# Patient Record
Sex: MALE | Race: WHITE | NOT HISPANIC OR LATINO | Employment: OTHER | URBAN - METROPOLITAN AREA
[De-identification: names, ages, dates, MRNs, and addresses within clinical notes are randomized per-mention and may not be internally consistent; named-entity substitution may affect disease eponyms.]

---

## 2017-01-26 ENCOUNTER — ALLSCRIPTS OFFICE VISIT (OUTPATIENT)
Dept: OTHER | Facility: OTHER | Age: 77
End: 2017-01-26

## 2017-01-26 ENCOUNTER — TRANSCRIBE ORDERS (OUTPATIENT)
Dept: ADMINISTRATIVE | Facility: HOSPITAL | Age: 77
End: 2017-01-26

## 2017-01-26 DIAGNOSIS — E78.2 MIXED HYPERLIPIDEMIA: ICD-10-CM

## 2017-01-26 DIAGNOSIS — I48.0 PAROXYSMAL ATRIAL FIBRILLATION (HCC): Primary | ICD-10-CM

## 2017-01-26 DIAGNOSIS — R42 DIZZINESS AND GIDDINESS: ICD-10-CM

## 2017-01-26 DIAGNOSIS — I48.19 PERSISTENT ATRIAL FIBRILLATION (HCC): ICD-10-CM

## 2017-02-24 ENCOUNTER — HOSPITAL ENCOUNTER (OUTPATIENT)
Dept: NON INVASIVE DIAGNOSTICS | Facility: CLINIC | Age: 77
Discharge: HOME | End: 2017-02-24
Payer: COMMERCIAL

## 2017-02-24 DIAGNOSIS — I48.0 PAROXYSMAL ATRIAL FIBRILLATION (HCC): ICD-10-CM

## 2017-02-24 DIAGNOSIS — I48.19 PERSISTENT ATRIAL FIBRILLATION (HCC): ICD-10-CM

## 2017-02-24 DIAGNOSIS — R42 DIZZINESS AND GIDDINESS: ICD-10-CM

## 2017-02-24 PROCEDURE — 93306 TTE W/DOPPLER COMPLETE: CPT

## 2017-07-27 ENCOUNTER — ALLSCRIPTS OFFICE VISIT (OUTPATIENT)
Dept: OTHER | Facility: OTHER | Age: 77
End: 2017-07-27

## 2017-10-16 ENCOUNTER — GENERIC CONVERSION - ENCOUNTER (OUTPATIENT)
Dept: OTHER | Facility: OTHER | Age: 77
End: 2017-10-16

## 2017-10-24 ENCOUNTER — GENERIC CONVERSION - ENCOUNTER (OUTPATIENT)
Dept: OTHER | Facility: OTHER | Age: 77
End: 2017-10-24

## 2017-11-28 ENCOUNTER — GENERIC CONVERSION - ENCOUNTER (OUTPATIENT)
Dept: OTHER | Facility: OTHER | Age: 77
End: 2017-11-28

## 2017-11-28 LAB — PROSTATE SPECIFIC ANTIGEN TOTAL (HISTORICAL): 13.2

## 2017-12-08 LAB
A/G RATIO (HISTORICAL): 1.6 (ref 1.2–2.2)
ALBUMIN SERPL BCP-MCNC: 4.4 G/DL (ref 3.5–4.8)
ALP SERPL-CCNC: 110 IU/L (ref 39–117)
ALT SERPL W P-5'-P-CCNC: 9 IU/L (ref 0–44)
AST SERPL W P-5'-P-CCNC: 14 IU/L (ref 0–40)
BILIRUB SERPL-MCNC: 0.6 MG/DL (ref 0–1.2)
BUN SERPL-MCNC: 14 MG/DL (ref 8–27)
BUN/CREA RATIO (HISTORICAL): 12 (ref 10–24)
CALCIUM SERPL-MCNC: 9.7 MG/DL (ref 8.6–10.2)
CHLORIDE SERPL-SCNC: 101 MMOL/L (ref 96–106)
CHOLEST SERPL-MCNC: 216 MG/DL (ref 100–199)
CO2 SERPL-SCNC: 26 MMOL/L (ref 18–29)
CREAT SERPL-MCNC: 1.19 MG/DL (ref 0.76–1.27)
EGFR AFRICAN AMERICAN (HISTORICAL): 67 ML/MIN/1.73
EGFR-AMERICAN CALC (HISTORICAL): 58 ML/MIN/1.73
GLUCOSE SERPL-MCNC: 98 MG/DL (ref 65–99)
HDLC SERPL-MCNC: 64 MG/DL
LDL/HDL RATIO (HISTORICAL): 2 RATIO UNITS (ref 0–3.6)
LDLC SERPL CALC-MCNC: 130 MG/DL (ref 0–99)
POTASSIUM SERPL-SCNC: 4.1 MMOL/L (ref 3.5–5.2)
SODIUM SERPL-SCNC: 142 MMOL/L (ref 134–144)
TOT. GLOBULIN, SERUM (HISTORICAL): 2.7 G/DL (ref 1.5–4.5)
TOTAL PROTEIN (HISTORICAL): 7.1 G/DL (ref 6–8.5)
TRIGL SERPL-MCNC: 111 MG/DL (ref 0–149)
VLDLC SERPL CALC-MCNC: 22 MG/DL (ref 5–40)

## 2017-12-11 ENCOUNTER — GENERIC CONVERSION - ENCOUNTER (OUTPATIENT)
Dept: OTHER | Facility: OTHER | Age: 77
End: 2017-12-11

## 2017-12-15 ENCOUNTER — ALLSCRIPTS OFFICE VISIT (OUTPATIENT)
Dept: OTHER | Facility: OTHER | Age: 77
End: 2017-12-15

## 2017-12-18 ENCOUNTER — GENERIC CONVERSION - ENCOUNTER (OUTPATIENT)
Dept: FAMILY MEDICINE CLINIC | Facility: CLINIC | Age: 77
End: 2017-12-18

## 2018-01-12 NOTE — PROGRESS NOTES
Assessment    1  Closed displaced fracture of shaft of right clavicle with routine healing (V54 19)   (S42 021D)    Plan  Closed displaced fracture of shaft of right clavicle with routine healing    · Follow-up visit in 3 weeks Evaluation and Treatment  Follow-up  Status: Hold For -  Scheduling  Requested for: 69Gqh3213  Dislocation of shoulder region, right, initial encounter    · XR CLAVICLE RIGHT; Status:Active; Requested for:48Kum5267;     Chief Complaint  Right shoulder injury  Discussion/Summary    Patient was seen and examined by Dr Ernst Kahn and myself  Findings consistent with displaced right clavicle mid-distal fracture  Discussed findings and treatment options  Recommend cont use of figure of 8 strap 23 hours/day for 3 more weeks  Dr Ernst Kahn adjusted the strap and modified the Velcro end for patient again  Follow-up in 3 weeks with x-rays in strap  History of Present Illness  68 yo white male injured his right shoulder last night while walking his dog  He was pull by his dog and landed on his right shoulder  He did have pain and felt a pop in his right shoulder  He was seen by his PCP today and order a x-rays of his right shoulder  He is c/o pain over the right shoulder and not able to move his arm due to pain  He denies any other injury  He follows up to the office today  His pain is well-controlled  He is tolerating the figure-of-eight strap better  He continues to complain of swelling in his right upper extremity  Review of Systems    Constitutional: No fever or chills, feels well, no tiredness, no recent weight loss or weight gain  Eyes: No complaints of red eyes, no eyesight problems  ENT: no complaints of loss of hearing, no nosebleeds, no sore throat  Cardiovascular: No complaints of chest pain, no palpitations, no leg claudication or lower extremity edema  Respiratory: No complaints of shortness of breath, no wheezing, no cough     Gastrointestinal: No complaints of abdominal pain, no constipation, no nausea or vomiting, no diarrhea or bloody stools  Genitourinary: No complaints of dysuria or incontinence, no hesitancy, no nocturia  Musculoskeletal: as noted in HPI  Integumentary: No complaints of skin rash or lesion, no itching or dry skin, no skin wounds  Neurological: No complaints of headache, no confusion, no numbness or tingling, no dizziness  Psychiatric: No suicidal thoughts, no anxiety, no depression  Endocrine: No muscle weakness, no frequent urination, no excessive thirst, no feelings of weakness  ROS reviewed  Active Problems    1  Atrial fibrillation (427 31) (I48 91)   2  Atrial flutter (427 32) (I48 92)   3  Basal cell carcinoma of skin (173 91) (C44 91)   4  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)   5  Closed displaced fracture of shaft of right clavicle with routine healing (V54 19)   (S42 021D)   6  Closed displaced fracture of shaft of right clavicle, initial encounter (810 02) (S42 021A)   7  Congestive heart failure (428 0) (I50 9)   8  Depression (311) (F32 9)   9  Dislocation of shoulder region, right, initial encounter (831 00) (S43 004A)   10  Diverticulitis of colon (562 11) (K57 32)   11  Dysphagia (787 20) (R13 10)   12  Fatigue (780 79) (R53 83)   13  Melanoma in situ (172 9) (D03 9)   14  Need for vaccination (V05 9) (Z23)   15  Osteoarthritis (715 90) (M19 90)   16  Preoperative clearance (V72 84) (Z01 818)   17   Prostate hyperplasia, benign localized, without urinary obstruction (600 20) (N40 0)    Past Medical History    · History of Acute postoperative pain (338 18) (G89 18)   · History of Diverticulitis Of Colon With Abscess (562 11)   · History of Diverticulitis Of Colon With Abscess (562 11)   · History of Dysphagia (787 20) (R13 10)   · History of Flu vaccine need (V04 81) (Z23)   · History of abdominal pain (V13 89) (T02 909)   · History of atrial fibrillation (V12 59) (Z86 79)    Surgical History    · History of Colostomy Temporary   · History of Complete Colonoscopy   · History of Ileostomy Reversal   · History of Sigmoid Colon Procedure Intestinal Anastomosis Made   · History of Surgery Vas Deferens Vasectomy   · History of Surgical Drainage Of Retroperitoneal Abscess Percutaneous    Family History  Mother    · No pertinent family history  Family History    · Denied: Family history of Coronary Artery Disease    Social History    · Marital History - Currently    · Never A Smoker   · Retired From Work    Current Meds   1  Centrum Silver Oral Tablet; Therapy: (Recorded:12Kss4991) to Recorded   2  Diltiazem HCl ER Coated Beads 240 MG Oral Capsule Extended Release 24 Hour;   TAKE ONE CAPSULE BY MOUTH EVERY DAY; Therapy: 20FNY4293 to (Evaluate:37Bpw3503)  Requested for: 77MDU1070; Last   Rx:90Ywi2365 Ordered   3  Eliquis 5 MG Oral Tablet; take 1 tablet every twelve hours; Therapy: 99DXA6781 to (Evaluate:76Iel8888)  Requested for: 17YSH3808; Last   Rx:38Xfm7181 Ordered   4  Flecainide Acetate 100 MG Oral Tablet; TAKE 1 TABLET EVERY 12 HOURS DAILY; Therapy: 96Bpj3479 to (Evaluate:19San6188)  Requested for: 28Jfp4764; Last   Rx:28Tqf5347 Ordered   5  FLUoxetine HCl - 10 MG Oral Capsule; TAKE 1-2 CAPSULE DAILY; Therapy: 91CIY4510 to (Evaluate:84Poo7665)  Requested for: 92NWH5526; Last   Rx:06Jun2016 Ordered   6  Joint Support CAPS; Therapy: (Recorded:83Xvh3607) to Recorded   7  Probiotic CAPS; take 1 capsule daily; Therapy: (Recorded:80Xfc7925) to Recorded   8  Saw [place] (Serenoa repens) 450 MG CAPS; TAKE 2 CAPSULE Daily; Therapy: (Recorded:14Mne4116) to Recorded   9  Tamsulosin HCl - 0 4 MG Oral Capsule; take 1 capsule by mouth once daily; Therapy: 12WGJ9727 to (Last Rx:22Esb7101)  Requested for: 46UBA3024 Ordered   10  Vicodin 5-300 MG Oral Tablet; TAKE 1 TO 2 TABLETS EVERY 4 TO 6 HOURS AS    NEEDED FOR PAIN;    Therapy: 84Hjf7959 to (Evaluate:96Pwr4552); Last Rx:19Zym9207 Ordered    Allergies    1   No Known Drug Allergies    Vitals   Recorded: 81LIH7800 27:17AK   Systolic 855, LUE, Sitting   Diastolic 80, LUE, Sitting   Heart Rate 82   Height 6 ft    Weight 159 lb 4 00 oz   BMI Calculated 21 6   BSA Calculated 1 94     Physical Exam    Constitutional - General appearance: Normal    Musculoskeletal - Gait and station: Normal    Neurologic - Sensation: Normal  Upper extremity peripheral neuro exam: Normal    Psychiatric - Orientation to person, place, and time: Normal  Mood and affect: Normal    Eyes   Conjunctiva and lids: Normal     Right Shoulder: Appearance: midshaft clavicle deformity, distal clavicle deformity, ecchymosis and shoulder is swollen, but no AC joint hypertrophy, no AC joint step-off, no deltoid atrophy, no trapezius atrophy, no belly of the biceps nataly deformity, no skin blanching and no skin tenting  Tenderness: midshaft clavicle and distal clavicle, but not the AC joint, not the bicipital groove, not the coracoid, not the sternoclavicular joint and not the greater tuberosity  ROM: Deferred  Motor: 5/5 internal rotation and 5/5 external rotation  Special Tests: positive Painful Arc  Results/Data  I personally reviewed the films/images/results in the office today  My interpretation follows  X-ray Review 2 views of the right clavicle reveal a healing distal third clavicle fracture maintaining good alignment  Signatures   Electronically signed by : GHISLAINE Stone;  Aug 22 2016 11:06AM EST                       (Author)    Electronically signed by : Pedro Knapp MD; Aug 22 2016 11:43AM EST                       (Author)

## 2018-01-14 VITALS
RESPIRATION RATE: 14 BRPM | DIASTOLIC BLOOD PRESSURE: 80 MMHG | BODY MASS INDEX: 21.81 KG/M2 | HEIGHT: 72 IN | WEIGHT: 161 LBS | HEART RATE: 64 BPM | SYSTOLIC BLOOD PRESSURE: 136 MMHG

## 2018-01-14 VITALS
HEIGHT: 72 IN | HEART RATE: 71 BPM | SYSTOLIC BLOOD PRESSURE: 120 MMHG | BODY MASS INDEX: 21.67 KG/M2 | WEIGHT: 160 LBS | DIASTOLIC BLOOD PRESSURE: 80 MMHG | RESPIRATION RATE: 14 BRPM

## 2018-01-17 NOTE — RESULT NOTES
Verified Results  ([address] [facility]) Lipid Panel With LDL/HDL Ratio 95RYW9041 09:53AM [facility] Daily     Test Name Result Flag Reference   Cholesterol, Total 230 mg/dL H 100-199   Triglycerides 107 mg/dL  0-149   HDL Cholesterol 69 mg/dL  >39   According to ATP-III Guidelines, HDL-C >59 mg/dL is considered a  negative risk factor for CHD  VLDL Cholesterol [place] 21 mg/dL  5-40   LDL Cholesterol Calc 140 mg/dL H 0-99   LDL/HDL Ratio 2 0 ratio units  0 0-3 6   LDL/HDL Ratio                                                             Men  Women                                               1/2 Avg  Risk  1 0    1 5                                                   Avg Risk  3 6    3 2                                                2X Avg  Risk  6 2    5 0                                                3X Avg  Risk  8 0    6 1     (1) COMPREHENSIVE METABOLIC PANEL 59JIC6455 33:95AG [facility] Daily     Test Name Result Flag Reference   Glucose, Serum 98 mg/dL  65-99   BUN 16 mg/dL  8-27   Creatinine, Serum 1 19 mg/dL  0 76-1 27   eGFR If NonAfricn Am 59 mL/min/1 73 L >59   eGFR If Africn Am 68 mL/min/1 73  >59   BUN/Creatinine Ratio 13  10-22   Sodium, Serum 143 mmol/L  136-144   Potassium, Serum 4 3 mmol/L  3 5-5 2   Chloride, Serum 101 mmol/L     Carbon Dioxide, Total 26 mmol/L  18-29   Calcium, Serum 9 8 mg/dL  8 6-10 2   Protein, Total, Serum 7 0 g/dL  6 0-8 5   Albumin, Serum 4 4 g/dL  3 5-4 8   Globulin, Total 2 6 g/dL  1 5-4 5   A/G Ratio 1 7  1 1-2 5   Bilirubin, Total 0 4 mg/dL  0 0-1 2   Alkaline Phosphatase, S 111 IU/L     AST (SGOT) 13 IU/L  0-40   ALT (SGPT) 8 IU/L  0-44     (1) PSA (SCREEN) (Dx V76 44 Screen for Prostate Cancer) 99NSX9722 09:53AM [facility] Daily     Test Name Result Flag Reference   Prostate Specific Ag, Serum 14 5 ng/mL H 0 0-4 0   Roche ECLIA methodology  According to the American Urological Association, Serum PSA should  decrease and remain at undetectable levels after radical  prostatectomy   The AUA defines biochemical recurrence as an initial  PSA value 0 2 ng/mL or greater followed by a subsequent confirmatory  PSA value 0 2 ng/mL or greater  Values obtained with different assay methods or kits cannot be used  interchangeably  Results cannot be interpreted as absolute evidence  of the presence or absence of malignant disease

## 2018-01-23 VITALS
BODY MASS INDEX: 22.21 KG/M2 | WEIGHT: 164 LBS | HEIGHT: 72 IN | DIASTOLIC BLOOD PRESSURE: 84 MMHG | SYSTOLIC BLOOD PRESSURE: 136 MMHG

## 2018-01-23 NOTE — PROGRESS NOTES
Assessment    1  Encounter for preventive health examination (V70 0) (Z00 00)   2  Atrial fibrillation (427 31) (I48 91)    Discussion/Summary  health maintenance visit eats an adequate diet Currently, he has an inadequate exercise regimen  Prostate cancer screening: prostate cancer screening is managed by nayeli  Testicular cancer screening: testicular cancer screening is not indicated  Colorectal cancer screening: colorectal cancer screening is not indicated  Screening lab work includes Have been completed  The risks and benefits of immunizations were discussed  He was advised to be evaluated by an optometrist and a dentist  Advice and education were given regarding aerobic exercise, seat belt use and advanced directive planning  Patient discussion: discussed with the patient  Bring copy of advance directive for office record  , follow up with Urology on PSA elevations  Vaccines are current  The treatment plan was reviewed with the patient/guardian  The patient/guardian understands and agrees with the treatment plan      Chief Complaint  health maint      History of Present Illness  HM, Adult Male: The last health maintenance visit was 1 year(s) ago  Social History: Household members include spouse  He is   Work status: occupation: retired  The patient has never smoked cigarettes and has never used smokeless tobacco  He reports never drinking alcohol  General Health: The patient's health since the last visit is described as good  He has regular dental visits  He denies vision problems  He denies hearing loss  Immunizations status: up to date  Lifestyle:  He consumes a diverse and healthy diet  He does not have any weight concerns  He does not exercise regularly  He does not use tobacco  He denies alcohol use  He denies drug use  Screening: Active Problems    1  Atrial fibrillation (427 31) (I48 91)   2  Atrial flutter (427 32) (I48 92)   3   Degenerative joint disease (621 90) (M19 90) 4  Depression (311) (F32 9)   5  Hyperlipemia, mixed (272 2) (E78 2)   6  Prostate hyperplasia, benign localized, without urinary obstruction (600 20) (N40 0)   7  Tremor (781 0) (R25 1)    Past Medical History    · History of Acute postoperative pain (338 18) (G89 18)   · History of 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)   · History of Diverticulitis Of Colon With Abscess (562 11)   · History of Diverticulitis Of Colon With Abscess (562 11)   · History of Dysphagia (787 20) (R13 10)   · History of abdominal pain (V13 89) (Z08 583)   · History of atrial fibrillation (V12 59) (Z86 79)   · History of basal cell carcinoma (BCC) (V10 83) (Z85 828)   · History of melanoma in situ (V10 82) (Z86 008)   · History of Preoperative clearance (V72 84) (Z01 818)    Surgical History    · History of Colostomy Temporary   · History of Complete Colonoscopy   · History of Ileostomy Reversal   · History of Sigmoid Colon Procedure Intestinal Anastomosis Made   · History of Surgery Vas Deferens Vasectomy   · History of Surgical Drainage Of Retroperitoneal Abscess Percutaneous    Family History  Mother    · No pertinent family history  Family History    · Denied: Family history of Coronary Artery Disease    Social History    · Marital History - Currently    · Never A Smoker   · Retired From Work    Current Meds   1  Centrum Silver Oral Tablet; Therapy: (Recorded:39Nbl5974) to Recorded   2  DilTIAZem HCl ER Coated Beads 240 MG Oral Capsule Extended Release 24 Hour;   TAKE ONE CAPSULE BY MOUTH EVERY DAY; Therapy: 14SBB4494 to (Darrell Vasquez)  Requested for: [phone]; Last   Rx:24Oct2017 Ordered   3  Eliquis 5 MG Oral Tablet; Take 1 tablet by mouth  every 12 hours; Therapy: 69TRH0383 to (Evaluate:59Qen9044)  Requested for: 00BXU2274; Last   Rx:16Nov2017 Ordered   4  Flecainide Acetate 100 MG Oral Tablet; TAKE 1 TABLET EVERY 12 HOURS DAILY;    Therapy: 99Dxn6647 to (Evaluate:68Sqc8967)  Requested for: 42HBV2779; Last   Rx:20Oct2017 Ordered   5  FLUoxetine HCl - 10 MG Oral Capsule; take 1 capsule daily; Therapy: 60JRK3947 to (Evaluate:64Kox0039)  Requested for: 44ARK8885; Last   Rx:27Nov2017 Ordered   6  Joint Support CAPS; Therapy: (Recorded:40Rbj3084) to Recorded   7  Probiotic CAPS; take 1 capsule daily; Therapy: (Recorded:59Msk2806) to Recorded   8  Saw [place] (Serenoa repens) 450 MG CAPS; TAKE 2 CAPSULE Daily; Therapy: (Recorded:44Sqe6721) to Recorded    Allergies    1  No Known Drug Allergies    Vitals   Recorded: 61NPR9073 29:41PT   Systolic 014   Diastolic 84   Height 6 ft    Weight 164 lb    BMI Calculated 22 24   BSA Calculated 1 96     Physical Exam    Constitutional   General appearance: No acute distress, well appearing and well nourished  Head and Face   Head and face: Normal     Palpation of the face and sinuses: No sinus tenderness  Eyes   Conjunctiva and lids: No erythema, swelling or discharge  Pupils and irises: Equal, round, reactive to light  Ears, Nose, Mouth, and Throat   External inspection of ears and nose: Normal     Oropharynx: Normal with no erythema, edema, exudate or lesions  Neck   Neck: Supple, symmetric, trachea midline, no masses  Thyroid: Normal, no thyromegaly  Pulmonary   Respiratory effort: No increased work of breathing or signs of respiratory distress  Auscultation of lungs: Clear to auscultation  Cardiovascular   Auscultation of heart: Normal rate and rhythm, normal S1 and S2, no murmurs  Examination of extremities for edema and/or varicosities: Normal     Abdomen   Abdomen: Non-tender, no masses  Liver and spleen: No hepatomegaly or splenomegaly      Psychiatric   Orientation to person, place and time: Normal     Mood and affect: Normal        Results/Data  PHQ-9 Adult Depression Screening 18Wiu9346 11:53AM User, Ahs     Test Name Result Flag Reference   PHQ-9 Adult Depression Score 5     Over the last two weeks, how often have you been bothered by any of the following problems? Little interest or pleasure in doing things: Several days - 1  Feeling down, depressed, or hopeless: Several days - 1  Trouble falling or staying asleep, or sleeping too much: Several days - 1  Feeling tired or having little energy: More than half the days - 2  Poor appetite or over eating: Not at all - 0  Feeling bad about yourself - or that you are a failure or have let yourself or your family down: Not at all - 0  Trouble concentrating on things, such as reading the newspaper or watching television: Not at all - 0  Moving or speaking so slowly that other people could have noticed  Or the opposite -  being so fidgety or restless that you have been moving around a lot more than usual: Not at all - 0  Thoughts that you would be better off dead, or of hurting yourself in some way: Not at all - 0   PHQ-9 Adult Depression Screening Negative     PHQ-9 Difficulty Level Somewhat difficult     PHQ-9 Severity Mild Depression         Future Appointments    Date/Time Provider Specialty Site   01/26/2018 10:00 AM MARV Gallegos   Cardiology [address]     Signatures   Electronically signed by : Andrei Hernandez MD; Dec 15 2017  4:16PM EST                       (Author)

## 2018-01-23 NOTE — RESULT NOTES
Verified Results  (1) COMPREHENSIVE METABOLIC PANEL [id number] 09:74NO [facility] Inks     Test Name Result Flag Reference   Glucose, Serum 98 mg/dL  65-99   BUN 14 mg/dL  8-27   Creatinine, Serum 1 19 mg/dL  0 76-1 27   BUN/Creatinine Ratio 12  10-24   Sodium, Serum 142 mmol/L  134-144   Potassium, Serum 4 1 mmol/L  3 5-5 2   Chloride, Serum 101 mmol/L     Carbon Dioxide, Total 26 mmol/L  18-29   Calcium, Serum 9 7 mg/dL  8 6-10 2   Protein, Total, Serum 7 1 g/dL  6 0-8 5   Albumin, Serum 4 4 g/dL  3 5-4 8   Globulin, Total 2 7 g/dL  1 5-4 5   A/G Ratio 1 6  1 2-2 2   Bilirubin, Total 0 6 mg/dL  0 0-1 2   Alkaline Phosphatase, S 110 IU/L     AST (SGOT) 14 IU/L  0-40   ALT (SGPT) 9 IU/L  0-44   eGFR If NonAfricn Am 58 mL/min/1 73 L >59   eGFR If Africn Am 67 mL/min/1 73  >59     (LC) Lipid Panel With LDL/HDL Ratio 19KPA6201 09:53AM [facility] Inks     Test Name Result Flag Reference   Cholesterol, Total 216 mg/dL H 100-199   Triglycerides 111 mg/dL  0-149   HDL Cholesterol 64 mg/dL  >39   VLDL Cholesterol [place] 22 mg/dL  5-40   LDL Cholesterol Calc 130 mg/dL H 0-99   LDL/HDL Ratio 2 0 ratio units  0 0-3 6   LDL/HDL Ratio                                                             Men  Women                                               1/2 Avg  Risk  1 0    1 5                                                   Avg Risk  3 6    3 2                                                2X Avg  Risk  6 2    5 0                                                3X Avg  Risk  8 0    6 1

## 2018-01-26 ENCOUNTER — OFFICE VISIT (OUTPATIENT)
Dept: CARDIOLOGY CLINIC | Facility: CLINIC | Age: 78
End: 2018-01-26
Payer: COMMERCIAL

## 2018-01-26 VITALS
SYSTOLIC BLOOD PRESSURE: 118 MMHG | DIASTOLIC BLOOD PRESSURE: 70 MMHG | BODY MASS INDEX: 22.24 KG/M2 | WEIGHT: 164 LBS | HEART RATE: 70 BPM

## 2018-01-26 DIAGNOSIS — Z00.00 ENCOUNTER FOR HEALTH MAINTENANCE EXAMINATION: Primary | ICD-10-CM

## 2018-01-26 DIAGNOSIS — I48.91 ATRIAL FIBRILLATION (HCC): ICD-10-CM

## 2018-01-26 PROCEDURE — 93000 ELECTROCARDIOGRAM COMPLETE: CPT | Performed by: INTERNAL MEDICINE

## 2018-01-26 PROCEDURE — 99214 OFFICE O/P EST MOD 30 MIN: CPT | Performed by: INTERNAL MEDICINE

## 2018-01-26 RX ORDER — MAG HYDROX/ALUMINUM HYD/SIMETH 400-400-40
450 SUSPENSION, ORAL (FINAL DOSE FORM) ORAL DAILY
COMMUNITY

## 2018-01-26 NOTE — PROGRESS NOTES
Cardiology Follow Up    Bismark Robison  1940  [id number]  [address] [facility] [address] 1  Encounter for health maintenance examination  POCT ECG       Interval History:     No chest pain, no dyspnea, no palpitations  Doing well Tolerating his medical therapy  History reviewed  No pertinent past medical history  Social History     Social History    Marital status: /Civil Union     Spouse name: N/A    Number of children: N/A    Years of education: N/A     Occupational History    Not on file  Social History Main Topics    Smoking status: Never Smoker    Smokeless tobacco: Never Used    Alcohol use No    Drug use: No    Sexual activity: Not on file     Other Topics Concern    Not on file     Social History Narrative    No narrative on file      History reviewed  No pertinent family history  History reviewed  No pertinent surgical history  Current Outpatient Prescriptions:     apixaban (ELIQUIS) 5 mg, Take 5 mg by mouth 2 (two) times a day, Disp: , Rfl:     diltiazem (CARDIZEM CD) 300 mg 24 hr capsule, 300 mg , Disp: , Rfl:     flecainide (TAMBOCOR) 50 mg tablet, 50 mg 2 (two) times a day , Disp: , Rfl:     FLUoxetine (PROzac) 10 mg capsule, Take 10 mg by mouth daily Indications: Depression  , Disp: , Rfl:     glucosamine-chondroitin 500-400 MG tablet, Take 1 tablet by mouth 2 (two) times a day , Disp: , Rfl:     multivitamin-iron-minerals-folic acid (CENTRUM) chewable tablet, Chew 1 tablet daily  , Disp: , Rfl:     Probiotic Product (PROBIOTIC-10 PO), Take 10 mg by mouth daily, Disp: , Rfl:     Saw [place] 450 MG CAPS, Take 450 mg by mouth daily, Disp: , Rfl:   No Known Allergies    Labs:     Chemistry        Component Value Date/Time     11/30/2015 1427    K 4 1 11/30/2015 1427     11/30/2015 1427    CO2 31 3 11/30/2015 1427    BUN 15 11/30/2015 1427    CREATININE 1 07 11/30/2015 1427        Component Value Date/Time    CALCIUM 9 8 11/30/2015 1427    ALKPHOS 76 02/15/2014 0620    AST 13 02/15/2014 0620    ALT 13 02/15/2014 0620    BILITOT 0 6 02/15/2014 0620            Lab Results   Component Value Date    CHOL 216 (H) 12/08/2017    CHOL 230 (H) 11/14/2016    CHOL 208 (H) 09/03/2015     Lab Results   Component Value Date    HDL 64 12/08/2017    HDL 69 11/14/2016    HDL 64 09/03/2015     Lab Results   Component Value Date    LDLCALC 130 (H) 12/08/2017    LDLCALC 140 (H) 11/14/2016    LDLCALC 124 (H) 09/03/2015     Lab Results   Component Value Date    TRIG 111 12/08/2017    TRIG 107 11/14/2016    TRIG 100 09/03/2015     No components found for: CHOLHDL    Imaging: No results found  EKG:  Normal Sinus Rhythm  Left axis deviation  No St abnormality    Review of Systems:  Review of Systems - All 12 point review of systems is negative  Vitals:    01/26/18 0959   BP: 118/70   Pulse: 70       Physical Exam:  Physical Examination:   General appearance - alert, well appearing, and in no distress  HEENT:  PERRLA, EOMI, no scleral icterus, no conjunctival pallor  NECK:  Supple, No elevated JVP, no thyromegaly, no carotid bruits  HEART:  Regular rate and rhythm, normal S1/S2, no S3/S4, no murmur or rub  LUNGS:  Clear to auscultation bilaterally  ABDOMEN:  Soft, non-tender, positive bowel sounds, no rebound or guarding  EXTREMITIES:  No edema  VASCULAR:  Normal pedal pulses       Discussion/Summary:    1  Paroxysmal Atrial Fibrillation: He remains in normal sinus rhythm  Continue Current medical management without any changes  The patient was counseled regarding diagnostic results, instructions for management, risk factor reductions, impressions  total time of encounter was 25 minutes and 15 minutes was spent counseling

## 2018-07-05 DIAGNOSIS — I48.91 ATRIAL FIBRILLATION (HCC): ICD-10-CM

## 2018-07-05 RX ORDER — APIXABAN 5 MG/1
TABLET, FILM COATED ORAL
Qty: 180 TABLET | Refills: 3 | Status: SHIPPED | OUTPATIENT
Start: 2018-07-05 | End: 2018-07-20 | Stop reason: SDUPTHER

## 2018-07-20 ENCOUNTER — OFFICE VISIT (OUTPATIENT)
Dept: CARDIOLOGY CLINIC | Facility: CLINIC | Age: 78
End: 2018-07-20
Payer: COMMERCIAL

## 2018-07-20 VITALS
BODY MASS INDEX: 22.48 KG/M2 | DIASTOLIC BLOOD PRESSURE: 70 MMHG | HEART RATE: 66 BPM | WEIGHT: 166 LBS | SYSTOLIC BLOOD PRESSURE: 128 MMHG | HEIGHT: 72 IN

## 2018-07-20 DIAGNOSIS — I48.19 PERSISTENT ATRIAL FIBRILLATION (HCC): ICD-10-CM

## 2018-07-20 DIAGNOSIS — I48.91 ATRIAL FIBRILLATION, UNSPECIFIED TYPE (HCC): Primary | ICD-10-CM

## 2018-07-20 PROCEDURE — 93000 ELECTROCARDIOGRAM COMPLETE: CPT | Performed by: INTERNAL MEDICINE

## 2018-07-20 PROCEDURE — 99214 OFFICE O/P EST MOD 30 MIN: CPT | Performed by: INTERNAL MEDICINE

## 2018-07-20 NOTE — PROGRESS NOTES
Cardiology Follow Up    Leo Verma  1940  [address] [facility] [address]  3 [facility] [phone]    1  Atrial fibrillation, unspecified type ([address] )  POCT ECG       Interval History: Followup for atrial fibrillation    Doing well  No chest pain, no dyspnea and no palpitations  Doing well with medical therapy  No past medical history on file  Social History     Social History    Marital status: /Civil Union     Spouse name: N/A    Number of children: N/A    Years of education: N/A     Occupational History    Not on file  Social History Main Topics    Smoking status: Never Smoker    Smokeless tobacco: Never Used    Alcohol use No    Drug use: No    Sexual activity: Not on file     Other Topics Concern    Not on file     Social History Narrative    No narrative on file      No family history on file  No past surgical history on file  Current Outpatient Prescriptions:     diltiazem (CARDIZEM CD) 300 mg 24 hr capsule, 300 mg , Disp: , Rfl:     ELIQUIS 5 MG, TAKE 1 TABLET BY MOUTH TWO  TIMES DAILY, Disp: 180 tablet, Rfl: 3    flecainide (TAMBOCOR) 50 mg tablet, 50 mg 2 (two) times a day , Disp: , Rfl:     FLUoxetine (PROzac) 10 mg capsule, Take 10 mg by mouth daily Indications: Depression  , Disp: , Rfl:     glucosamine-chondroitin 500-400 MG tablet, Take 1 tablet by mouth 2 (two) times a day , Disp: , Rfl:     multivitamin-iron-minerals-folic acid (CENTRUM) chewable tablet, Chew 1 tablet daily  , Disp: , Rfl:     Probiotic Product (PROBIOTIC-10 PO), Take 10 mg by mouth daily, Disp: , Rfl:     Saw [place] 450 MG CAPS, Take 450 mg by mouth daily, Disp: , Rfl:   No Known Allergies    Labs:     Chemistry        Component Value Date/Time     12/08/2017 0953    K 4 1 12/08/2017 0953     12/08/2017 0953    CO2 26 12/08/2017 0953    BUN 14 12/08/2017 0953 CREATININE 1 19 12/08/2017 0953        Component Value Date/Time    CALCIUM 9 7 12/08/2017 0953    ALKPHOS 110 12/08/2017 0953    AST 14 12/08/2017 0953    ALT 9 12/08/2017 0953    BILITOT 0 6 12/08/2017 0953            Lab Results   Component Value Date    CHOL 216 (H) 12/08/2017    CHOL 230 (H) 11/14/2016    CHOL 208 (H) 09/03/2015     Lab Results   Component Value Date    HDL 64 12/08/2017    HDL 69 11/14/2016    HDL 64 09/03/2015     Lab Results   Component Value Date    LDLCALC 130 (H) 12/08/2017    LDLCALC 140 (H) 11/14/2016    LDLCALC 124 (H) 09/03/2015     Lab Results   Component Value Date    TRIG 111 12/08/2017    TRIG 107 11/14/2016    TRIG 100 09/03/2015     No components found for: CHOLHDL    Imaging: No results found  EKG: Normal Sinus Rhythm  Review of Systems   Constitution: Negative  HENT: Negative  Eyes: Negative  Cardiovascular: Negative  Respiratory: Negative  Endocrine: Negative  Hematologic/Lymphatic: Negative  Skin: Negative  Musculoskeletal: Negative  Gastrointestinal: Negative  Genitourinary: Negative  Neurological: Negative  Psychiatric/Behavioral: Negative  Allergic/Immunologic: Negative  Vitals:    07/20/18 0948   BP: 128/70   Pulse: 66           Physical Exam   Constitutional: He is oriented to person, place, and time  No distress  HENT:   Mouth/Throat: No oropharyngeal exudate  Eyes: No scleral icterus  Neck: No JVD present  Cardiovascular: Normal rate and regular rhythm  No murmur heard  Pulmonary/Chest: Effort normal and breath sounds normal  No respiratory distress  He has no wheezes  He has no rales  Abdominal: Soft  Bowel sounds are normal  He exhibits no distension  There is no tenderness  There is no rebound  Musculoskeletal: He exhibits no edema  Neurological: He is alert and oriented to person, place, and time  Skin: Skin is warm and dry  He is not diaphoretic     Psychiatric: He has a normal mood and affect  His behavior is normal        Discussion/Summary:    Persistent Atrial Fibrillation: Remains in NSR on medical therapy  Overall doing very well  I made no changes today  The patient was counseled regarding diagnostic results, instructions for management, risk factor reductions, impressions  total time of encounter was 25 minutes and 15 minutes was spent counseling

## 2018-08-23 DIAGNOSIS — I48.19 PERSISTENT ATRIAL FIBRILLATION (HCC): Primary | ICD-10-CM

## 2018-08-23 RX ORDER — DILTIAZEM HYDROCHLORIDE 240 MG/1
CAPSULE, COATED, EXTENDED RELEASE ORAL
Qty: 30 CAPSULE | Refills: 5 | Status: SHIPPED | OUTPATIENT
Start: 2018-08-23 | End: 2018-12-23 | Stop reason: SDUPTHER

## 2018-09-27 DIAGNOSIS — I48.19 PERSISTENT ATRIAL FIBRILLATION (HCC): ICD-10-CM

## 2018-09-27 NOTE — TELEPHONE ENCOUNTER
Pt's wife Deshawn Saunders called stating [facility] [employer] called with a refill on pt's Eliquis however, she stated he gets Eliquis from [facility]  There is no documentation that the Eliquis was renewed since 7/20/18 at pt's office visit      A refill was requested today for [facility] so pt does not have to get medication through [facility]

## 2018-10-19 ENCOUNTER — TELEPHONE (OUTPATIENT)
Dept: FAMILY MEDICINE CLINIC | Facility: CLINIC | Age: 78
End: 2018-10-19

## 2018-10-19 NOTE — TELEPHONE ENCOUNTER
----- Message from Odalys Jensen MD sent at 10/19/2018  7:59 AM EDT -----  Call patient with lab result-PSA is higher, be sure has appt with Urology- Dr Jamar Madera

## 2018-10-31 ENCOUNTER — TELEPHONE (OUTPATIENT)
Dept: CARDIOLOGY CLINIC | Facility: CLINIC | Age: 78
End: 2018-10-31

## 2018-10-31 NOTE — TELEPHONE ENCOUNTER
Received call from pt's wife stating chiropractor wants pt to take Advil twice per day for inflammation, starting today  Pt's wife is concerned regarding compatibility with Eliquis and flecainide  Last OV 07/20/2018  Please advise, thank you

## 2018-11-12 ENCOUNTER — OFFICE VISIT (OUTPATIENT)
Dept: FAMILY MEDICINE CLINIC | Facility: CLINIC | Age: 78
End: 2018-11-12
Payer: COMMERCIAL

## 2018-11-12 VITALS
SYSTOLIC BLOOD PRESSURE: 132 MMHG | BODY MASS INDEX: 22.62 KG/M2 | OXYGEN SATURATION: 97 % | HEIGHT: 72 IN | HEART RATE: 78 BPM | WEIGHT: 167 LBS | DIASTOLIC BLOOD PRESSURE: 50 MMHG

## 2018-11-12 DIAGNOSIS — H10.32 ACUTE BACTERIAL CONJUNCTIVITIS OF LEFT EYE: Primary | ICD-10-CM

## 2018-11-12 PROCEDURE — 1036F TOBACCO NON-USER: CPT | Performed by: FAMILY MEDICINE

## 2018-11-12 PROCEDURE — 99213 OFFICE O/P EST LOW 20 MIN: CPT | Performed by: FAMILY MEDICINE

## 2018-11-12 PROCEDURE — 3008F BODY MASS INDEX DOCD: CPT | Performed by: FAMILY MEDICINE

## 2018-11-12 RX ORDER — TOBRAMYCIN 3 MG/ML
SOLUTION/ DROPS OPHTHALMIC
Qty: 5 ML | Refills: 0 | Status: SHIPPED | OUTPATIENT
Start: 2018-11-12 | End: 2019-01-22 | Stop reason: ALTCHOICE

## 2018-11-12 NOTE — PATIENT INSTRUCTIONS
Use the eyedrops 1-2 drops 4 times daily for next 5-7 days  I would expect to see some improvement 3-4 days  If you do not see any improvement you should get hold of on ophthalmologist to evaluate the eye further  Warm compresses may also help with itching and heal this better

## 2018-11-12 NOTE — PROGRESS NOTES
[id number]         NAME: Peewee Hendricks is a 77 y o  male  : 1940    MRN: [id number]  DATE: 2018  TIME: 10:10 AM    Assessment and Plan   Acute bacterial conjunctivitis of left eye [H10 32]  1  Acute bacterial conjunctivitis of left eye  tobramycin (TOBREX) 0 3 % SOLN       No problem-specific Assessment & Plan notes found for this encounter  Patient Instructions     Patient Instructions   Use the eyedrops 1-2 drops 4 times daily for next 5-7 days  I would expect to see some improvement 3-4 days  If you do not see any improvement you should get hold of on ophthalmologist to evaluate the eye further  Warm compresses may also help with itching and heal this better  Chief Complaint     Chief Complaint   Patient presents with    Eye Problem     infection for couple weeks         History of Present Illness       Left eye conjunctivitis over the last 3-4 weeks  Has been using over-the-counter medications which has not resolve the problem  There is some discharge with patient shot the morning  There is some blurring of the vision  Review of Systems   Review of Systems   Constitutional: Negative for chills, diaphoresis, fatigue and fever  HENT: Negative for congestion, ear discharge, ear pain, sinus pressure and sore throat  Eyes: Positive for discharge, itching and visual disturbance  Negative for photophobia, pain and redness  Skin: Negative for rash and wound           Current Medications       Current Outpatient Prescriptions:     apixaban (ELIQUIS) 5 mg, Take 1 tablet (5 mg total) by mouth 2 (two) times a day, Disp: 180 tablet, Rfl: 3    diltiazem (CARDIZEM CD) 240 mg 24 hr capsule, TAKE ONE CAPSULE BY MOUTH EVERY DAY, Disp: 30 capsule, Rfl: 5    diltiazem (CARDIZEM CD) 300 mg 24 hr capsule, 300 mg , Disp: , Rfl:     flecainide (TAMBOCOR) 50 mg tablet, 50 mg 2 (two) times a day , Disp: , Rfl:     FLUoxetine (PROzac) 10 mg capsule, Take 10 mg by mouth daily Indications: Depression  , Disp: , Rfl:     glucosamine-chondroitin 500-400 MG tablet, Take 1 tablet by mouth 2 (two) times a day , Disp: , Rfl:     multivitamin-iron-minerals-folic acid (CENTRUM) chewable tablet, Chew 1 tablet daily  , Disp: , Rfl:     Probiotic Product (PROBIOTIC-10 PO), Take 10 mg by mouth daily, Disp: , Rfl:     Saw [place] 450 MG CAPS, Take 450 mg by mouth daily, Disp: , Rfl:     tobramycin (TOBREX) 0 3 % SOLN, 1 drop to affected eyes 4 times daily, Disp: 5 mL, Rfl: 0    Current Allergies     Allergies as of 11/12/2018    (No Known Allergies)            The following portions of the patient's history were reviewed and updated as appropriate: allergies, current medications, past family history, past medical history, past social history, past surgical history and problem list      Past Medical History:   Diagnosis Date    Atrial fibrillation ([facility] [address] )     Basal cell carcinoma     last assessed 10/1/15, resolved 12/15/17    Benign neoplasm of large intestine     last assessed 11/22/16, resolved 12/15/17     Closed displaced fracture of shaft of right clavicle     last assessed 10/20/16, resolved 12/15/17     Diverticulitis of colon     last assessed 3/7/14, resolved 12/15/17     Dysphagia     last assessed 1/20/14, resolved 11/25/15    Melanoma in situ ([facility] [address] )     last assessed 2/15/16, resolved 12/15/17        Past Surgical History:   Procedure Laterality Date    ABSCESS DRAINAGE      Retroperitoneal abscess    ANASTOMOSIS  12/11/2013    Sigmoid colon    COLONOSCOPY      COLOSTOMY  12/11/2013    Temporary    ILEOSTOMY REVISION  02/11/2014    reversal    VASECTOMY         Family History   Problem Relation Age of Onset    No Known Problems Mother     Substance Abuse Neg Hx     Mental illness Neg Hx          Medications have been verified          Objective   /50   Pulse 78   Ht 6' (1 829 m)   Wt 75 8 kg (167 lb)   SpO2 97%   BMI 22 65 kg/m²        Physical Exam     Physical Exam   Constitutional: He appears well-developed and well-nourished  No distress  HENT:   Head: Normocephalic and atraumatic  Eyes: Pupils are equal, round, and reactive to light  Right eye exhibits no discharge and no exudate  Left eye exhibits discharge and exudate  Right conjunctiva is not injected  Right conjunctiva has a hemorrhage  Left conjunctiva is injected  Left conjunctiva has no hemorrhage  Right eye exhibits normal extraocular motion  Left eye exhibits normal extraocular motion

## 2018-12-23 DIAGNOSIS — I48.19 PERSISTENT ATRIAL FIBRILLATION (HCC): ICD-10-CM

## 2019-01-07 RX ORDER — DILTIAZEM HYDROCHLORIDE 240 MG/1
CAPSULE, COATED, EXTENDED RELEASE ORAL
Qty: 30 CAPSULE | Refills: 4 | Status: SHIPPED | OUTPATIENT
Start: 2019-01-07 | End: 2019-03-23 | Stop reason: SDUPTHER

## 2019-01-22 ENCOUNTER — OFFICE VISIT (OUTPATIENT)
Dept: CARDIOLOGY CLINIC | Facility: CLINIC | Age: 79
End: 2019-01-22
Payer: COMMERCIAL

## 2019-01-22 VITALS
DIASTOLIC BLOOD PRESSURE: 76 MMHG | HEIGHT: 72 IN | HEART RATE: 60 BPM | SYSTOLIC BLOOD PRESSURE: 128 MMHG | BODY MASS INDEX: 22.84 KG/M2 | WEIGHT: 168.6 LBS

## 2019-01-22 DIAGNOSIS — I48.91 ATRIAL FIBRILLATION, UNSPECIFIED TYPE (HCC): Primary | ICD-10-CM

## 2019-01-22 PROCEDURE — 93000 ELECTROCARDIOGRAM COMPLETE: CPT | Performed by: INTERNAL MEDICINE

## 2019-01-22 PROCEDURE — 99214 OFFICE O/P EST MOD 30 MIN: CPT | Performed by: INTERNAL MEDICINE

## 2019-01-22 NOTE — PROGRESS NOTES
Cardiology Follow Up    [facility]  1940  [address] [facility] Vahid Hsu  3 [facility] [phone]    1  Atrial fibrillation, unspecified type ([facility] [address] )  POCT ECG       Interval History: Followup Afib    Doing well  No chest pain, no dyspnea and no palps  Doing well with medical therapy  Past Medical History:   Diagnosis Date    Atrial fibrillation ([facility] [address] )     Basal cell carcinoma     last assessed 10/1/15, resolved 12/15/17    Benign neoplasm of large intestine     last assessed 11/22/16, resolved 12/15/17     Closed displaced fracture of shaft of right clavicle     last assessed 10/20/16, resolved 12/15/17     Diverticulitis of colon     last assessed 3/7/14, resolved 12/15/17     Dysphagia     last assessed 1/20/14, resolved 11/25/15    Melanoma in situ [facility])     last assessed 2/15/16, resolved 12/15/17      Social History     Social History    Marital status: /Civil Union     Spouse name: N/A    Number of children: N/A    Years of education: N/A     Occupational History    Not on file       Social History Main Topics    Smoking status: Never Smoker    Smokeless tobacco: Never Used    Alcohol use No    Drug use: No    Sexual activity: Not on file     Other Topics Concern    Not on file     Social History Narrative    No narrative on file      Family History   Problem Relation Age of Onset    No Known Problems Mother     Substance Abuse Neg Hx     Mental illness Neg Hx      Past Surgical History:   Procedure Laterality Date    ABSCESS DRAINAGE      Retroperitoneal abscess    ANASTOMOSIS  12/11/2013    Sigmoid colon    COLONOSCOPY      COLOSTOMY  12/11/2013    Temporary    ILEOSTOMY REVISION  02/11/2014    reversal    VASECTOMY         Current Outpatient Prescriptions:     apixaban (ELIQUIS) 5 mg, Take 1 tablet (5 mg total) by mouth 2 (two) times a day, Disp: 180 tablet, Rfl: 3    diltiazem (CARDIZEM CD) 240 mg 24 hr capsule, TAKE ONE CAPSULE BY MOUTH EVERY DAY, Disp: 30 capsule, Rfl: 4    flecainide (TAMBOCOR) 50 mg tablet, 50 mg 2 (two) times a day , Disp: , Rfl:     glucosamine-chondroitin 500-400 MG tablet, Take 1 tablet by mouth 2 (two) times a day , Disp: , Rfl:     multivitamin-iron-minerals-folic acid (CENTRUM) chewable tablet, Chew 1 tablet daily  , Disp: , Rfl:     Probiotic Product (PROBIOTIC-10 PO), Take 10 mg by mouth daily, Disp: , Rfl:     Saw [place] 450 MG CAPS, Take 450 mg by mouth daily, Disp: , Rfl:   No Known Allergies    Labs:     Chemistry        Component Value Date/Time     12/08/2017 0953    K 4 1 12/08/2017 0953     12/08/2017 0953    CO2 26 12/08/2017 0953    BUN 14 12/08/2017 0953    CREATININE 1 19 12/08/2017 0953        Component Value Date/Time    CALCIUM 9 7 12/08/2017 0953    ALKPHOS 110 12/08/2017 0953    AST 14 12/08/2017 0953    ALT 9 12/08/2017 0953    BILITOT 0 6 12/08/2017 0953            Lab Results   Component Value Date    CHOL 216 (H) 12/08/2017    CHOL 230 (H) 11/14/2016    CHOL 208 (H) 09/03/2015     Lab Results   Component Value Date    HDL 64 12/08/2017    HDL 69 11/14/2016    HDL 64 09/03/2015     Lab Results   Component Value Date    LDLCALC 130 (H) 12/08/2017    LDLCALC 140 (H) 11/14/2016    LDLCALC 124 (H) 09/03/2015     Lab Results   Component Value Date    TRIG 111 12/08/2017    TRIG 107 11/14/2016    TRIG 100 09/03/2015     No results found for: CHOLHDL    Imaging: No results found  EKG: NSR  LAD  Review of Systems   Constitution: Negative  HENT: Negative  Eyes: Negative  Cardiovascular: Negative  Respiratory: Negative  Endocrine: Negative  Hematologic/Lymphatic: Negative  Skin: Negative  Musculoskeletal: Negative  Gastrointestinal: Negative  Genitourinary: Negative  Neurological: Negative  Psychiatric/Behavioral: Negative  Allergic/Immunologic: Negative  Vitals:    01/22/19 1002   BP: 128/76   Pulse: 60           Physical Exam   Constitutional: He is oriented to person, place, and time  No distress  HENT:   Mouth/Throat: No oropharyngeal exudate  Eyes: No scleral icterus  Neck: No JVD present  Cardiovascular: Normal rate and regular rhythm  No murmur heard  Pulmonary/Chest: Effort normal and breath sounds normal  No respiratory distress  He has no wheezes  He has no rales  Abdominal: Soft  Bowel sounds are normal  He exhibits no distension  There is no tenderness  There is no rebound  Musculoskeletal: He exhibits no edema  Neurological: He is alert and oriented to person, place, and time  Skin: Skin is warm and dry  He is not diaphoretic  Psychiatric: He has a normal mood and affect  His behavior is normal        Discussion/Summary:    Persistent Atrial Fibrillation: Remains in NSR on medical therapy  Doing well  No changes  The patient was counseled regarding diagnostic results, instructions for management, risk factor reductions, impressions  total time of encounter was 25 minutes and 15 minutes was spent counseling

## 2019-01-28 DIAGNOSIS — I48.91 ATRIAL FIBRILLATION, UNSPECIFIED TYPE (HCC): Primary | ICD-10-CM

## 2019-01-28 RX ORDER — FLECAINIDE ACETATE 100 MG/1
100 TABLET ORAL 2 TIMES DAILY
Qty: 60 TABLET | Refills: 5 | Status: SHIPPED | OUTPATIENT
Start: 2019-01-28 | End: 2019-07-26 | Stop reason: SDUPTHER

## 2019-01-28 NOTE — TELEPHONE ENCOUNTER
Per my request, pt called back with the correct tablet strength for his flecainide  Pt takes flecainide 100mg BID  Medication list changed to reflect correction  New rx sent for correct dose

## 2019-03-23 DIAGNOSIS — I48.19 PERSISTENT ATRIAL FIBRILLATION (HCC): ICD-10-CM

## 2019-03-25 RX ORDER — DILTIAZEM HYDROCHLORIDE 240 MG/1
CAPSULE, COATED, EXTENDED RELEASE ORAL
Qty: 90 CAPSULE | Refills: 0 | Status: SHIPPED | OUTPATIENT
Start: 2019-03-25 | End: 2019-06-15 | Stop reason: SDUPTHER

## 2019-05-30 DIAGNOSIS — F32.9 MAJOR DEPRESSIVE DISORDER WITH CURRENT ACTIVE EPISODE, UNSPECIFIED DEPRESSION EPISODE SEVERITY, UNSPECIFIED WHETHER RECURRENT: ICD-10-CM

## 2019-05-30 DIAGNOSIS — Z00.00 WELLNESS EXAMINATION: Primary | ICD-10-CM

## 2019-06-03 RX ORDER — FLUOXETINE 10 MG/1
10 CAPSULE ORAL DAILY
Qty: 30 CAPSULE | Refills: 0 | Status: SHIPPED | OUTPATIENT
Start: 2019-06-03 | End: 2019-06-24 | Stop reason: SDUPTHER

## 2019-06-15 DIAGNOSIS — I48.19 PERSISTENT ATRIAL FIBRILLATION (HCC): ICD-10-CM

## 2019-06-17 RX ORDER — DILTIAZEM HYDROCHLORIDE 240 MG/1
CAPSULE, COATED, EXTENDED RELEASE ORAL
Qty: 90 CAPSULE | Refills: 0 | Status: SHIPPED | OUTPATIENT
Start: 2019-06-17 | End: 2019-09-14 | Stop reason: SDUPTHER

## 2019-06-18 DIAGNOSIS — Z00.00 WELLNESS EXAMINATION: Primary | ICD-10-CM

## 2019-06-20 ENCOUNTER — TELEPHONE (OUTPATIENT)
Dept: FAMILY MEDICINE CLINIC | Facility: CLINIC | Age: 79
End: 2019-06-20

## 2019-06-20 LAB
ALBUMIN SERPL-MCNC: 4.2 G/DL (ref 3.5–4.7)
ALBUMIN/GLOB SERPL: 1.6 {RATIO} (ref 1.2–2.2)
ALP SERPL-CCNC: 96 IU/L (ref 39–117)
ALT SERPL-CCNC: 9 IU/L (ref 0–44)
AST SERPL-CCNC: 16 IU/L (ref 0–40)
BILIRUB SERPL-MCNC: 0.4 MG/DL (ref 0–1.2)
BUN SERPL-MCNC: 12 MG/DL (ref 8–27)
BUN/CREAT SERPL: 11 (ref 10–24)
CALCIUM SERPL-MCNC: 9.3 MG/DL (ref 8.6–10.2)
CHLORIDE SERPL-SCNC: 103 MMOL/L (ref 96–106)
CHOLEST SERPL-MCNC: 190 MG/DL (ref 100–199)
CHOLEST/HDLC SERPL: 3.4 RATIO (ref 0–5)
CO2 SERPL-SCNC: 25 MMOL/L (ref 20–29)
CREAT SERPL-MCNC: 1.13 MG/DL (ref 0.76–1.27)
GLOBULIN SER-MCNC: 2.6 G/DL (ref 1.5–4.5)
GLUCOSE SERPL-MCNC: 92 MG/DL (ref 65–99)
HDLC SERPL-MCNC: 56 MG/DL
LDLC SERPL CALC-MCNC: 108 MG/DL (ref 0–99)
POTASSIUM SERPL-SCNC: 4.4 MMOL/L (ref 3.5–5.2)
PROT SERPL-MCNC: 6.8 G/DL (ref 6–8.5)
SL AMB EGFR AFRICAN AMERICAN: 71 ML/MIN/1.73
SL AMB EGFR NON AFRICAN AMERICAN: 61 ML/MIN/1.73
SL AMB VLDL CHOLESTEROL CALC: 26 MG/DL (ref 5–40)
SODIUM SERPL-SCNC: 142 MMOL/L (ref 134–144)
TRIGL SERPL-MCNC: 132 MG/DL (ref 0–149)

## 2019-06-22 DIAGNOSIS — I48.19 PERSISTENT ATRIAL FIBRILLATION (HCC): ICD-10-CM

## 2019-06-24 ENCOUNTER — OFFICE VISIT (OUTPATIENT)
Dept: FAMILY MEDICINE CLINIC | Facility: CLINIC | Age: 79
End: 2019-06-24
Payer: COMMERCIAL

## 2019-06-24 VITALS
BODY MASS INDEX: 22.48 KG/M2 | SYSTOLIC BLOOD PRESSURE: 124 MMHG | DIASTOLIC BLOOD PRESSURE: 86 MMHG | TEMPERATURE: 98.5 F | OXYGEN SATURATION: 98 % | HEART RATE: 67 BPM | HEIGHT: 72 IN | WEIGHT: 166 LBS

## 2019-06-24 DIAGNOSIS — Z23 NEED FOR 23-POLYVALENT PNEUMOCOCCAL POLYSACCHARIDE VACCINE: ICD-10-CM

## 2019-06-24 DIAGNOSIS — Z79.01 ANTICOAGULANT LONG-TERM USE: ICD-10-CM

## 2019-06-24 DIAGNOSIS — Z00.00 MEDICARE ANNUAL WELLNESS VISIT, SUBSEQUENT: Primary | ICD-10-CM

## 2019-06-24 DIAGNOSIS — F33.41 RECURRENT MAJOR DEPRESSIVE DISORDER, IN PARTIAL REMISSION (HCC): ICD-10-CM

## 2019-06-24 DIAGNOSIS — I48.0 PAROXYSMAL ATRIAL FIBRILLATION (HCC): ICD-10-CM

## 2019-06-24 DIAGNOSIS — M15.9 PRIMARY OSTEOARTHRITIS INVOLVING MULTIPLE JOINTS: ICD-10-CM

## 2019-06-24 PROCEDURE — 4040F PNEUMOC VAC/ADMIN/RCVD: CPT

## 2019-06-24 PROCEDURE — G0009 ADMIN PNEUMOCOCCAL VACCINE: HCPCS

## 2019-06-24 PROCEDURE — 1036F TOBACCO NON-USER: CPT | Performed by: FAMILY MEDICINE

## 2019-06-24 PROCEDURE — 3725F SCREEN DEPRESSION PERFORMED: CPT | Performed by: FAMILY MEDICINE

## 2019-06-24 PROCEDURE — 90732 PPSV23 VACC 2 YRS+ SUBQ/IM: CPT

## 2019-06-24 PROCEDURE — 1160F RVW MEDS BY RX/DR IN RCRD: CPT | Performed by: FAMILY MEDICINE

## 2019-06-24 PROCEDURE — 99214 OFFICE O/P EST MOD 30 MIN: CPT | Performed by: FAMILY MEDICINE

## 2019-06-24 PROCEDURE — 1170F FXNL STATUS ASSESSED: CPT | Performed by: FAMILY MEDICINE

## 2019-06-24 PROCEDURE — 1125F AMNT PAIN NOTED PAIN PRSNT: CPT | Performed by: FAMILY MEDICINE

## 2019-06-24 PROCEDURE — G0439 PPPS, SUBSEQ VISIT: HCPCS | Performed by: FAMILY MEDICINE

## 2019-06-24 RX ORDER — FLUOXETINE 10 MG/1
10 CAPSULE ORAL DAILY
Qty: 90 CAPSULE | Refills: 1 | Status: SHIPPED | OUTPATIENT
Start: 2019-06-24 | End: 2019-11-25 | Stop reason: SDUPTHER

## 2019-06-24 RX ORDER — DILTIAZEM HYDROCHLORIDE 240 MG/1
CAPSULE, COATED, EXTENDED RELEASE ORAL
Qty: 30 CAPSULE | Refills: 4 | Status: SHIPPED | OUTPATIENT
Start: 2019-06-24 | End: 2019-06-24 | Stop reason: ALTCHOICE

## 2019-06-28 ENCOUNTER — TELEPHONE (OUTPATIENT)
Dept: FAMILY MEDICINE CLINIC | Facility: CLINIC | Age: 79
End: 2019-06-28

## 2019-07-01 ENCOUNTER — TRANSCRIBE ORDERS (OUTPATIENT)
Dept: ADMINISTRATIVE | Facility: HOSPITAL | Age: 79
End: 2019-07-01

## 2019-07-01 DIAGNOSIS — R31.0 GROSS HEMATURIA: Primary | ICD-10-CM

## 2019-07-11 ENCOUNTER — HOSPITAL ENCOUNTER (OUTPATIENT)
Dept: ULTRASOUND IMAGING | Facility: CLINIC | Age: 79
Discharge: HOME | End: 2019-07-11
Payer: COMMERCIAL

## 2019-07-11 DIAGNOSIS — R31.0 GROSS HEMATURIA: ICD-10-CM

## 2019-07-11 PROCEDURE — 76770 US EXAM ABDO BACK WALL COMP: CPT

## 2019-07-25 ENCOUNTER — OFFICE VISIT (OUTPATIENT)
Dept: CARDIOLOGY CLINIC | Facility: CLINIC | Age: 79
End: 2019-07-25
Payer: COMMERCIAL

## 2019-07-25 VITALS
WEIGHT: 162 LBS | HEIGHT: 72 IN | BODY MASS INDEX: 21.94 KG/M2 | DIASTOLIC BLOOD PRESSURE: 76 MMHG | HEART RATE: 67 BPM | SYSTOLIC BLOOD PRESSURE: 120 MMHG

## 2019-07-25 DIAGNOSIS — Z01.810 PRE-OPERATIVE CARDIOVASCULAR EXAMINATION: Primary | ICD-10-CM

## 2019-07-25 PROCEDURE — 93000 ELECTROCARDIOGRAM COMPLETE: CPT | Performed by: INTERNAL MEDICINE

## 2019-07-25 PROCEDURE — 99214 OFFICE O/P EST MOD 30 MIN: CPT | Performed by: INTERNAL MEDICINE

## 2019-07-25 NOTE — PROGRESS NOTES
Cardiology Follow Up    Waldo Kumar  1940  [address] [facility] Amari Pittman  [address] [place] [id number] [phone]    1  Pre-operative cardiovascular examination  POCT ECG       Interval History: Followup for atrial fibrillation    No chest pain, no dyspnea no palpitations       Problem List     Paroxysmal atrial fibrillation (HCC)    Basal cell carcinoma of skin    Benign neoplasm of large intestine    Recurrent major depressive disorder, in partial remission ([facility] [address] )    Degenerative joint disease        Past Medical History:   Diagnosis Date    Atrial fibrillation ([facility] [address] )     Basal cell carcinoma     last assessed 10/1/15, resolved 12/15/17    Benign neoplasm of large intestine     last assessed 11/22/16, resolved 12/15/17     Closed displaced fracture of shaft of right clavicle     last assessed 10/20/16, resolved 12/15/17     Diverticulitis of colon     last assessed 3/7/14, resolved 12/15/17     Dysphagia     last assessed 1/20/14, resolved 11/25/15    Melanoma in situ [facility])     last assessed 2/15/16, resolved 12/15/17      Social History     Socioeconomic History    Marital status: /Civil Union     Spouse name: Not on file    Number of children: Not on file    Years of education: Not on file    Highest education level: Not on file   Occupational History    Not on file   Social Needs    Financial resource strain: Not on file    Food insecurity:     Worry: Not on file     Inability: Not on file    Transportation needs:     Medical: Not on file     Non-medical: Not on file   Tobacco Use    Smoking status: Never Smoker    Smokeless tobacco: Never Used   Substance and Sexual Activity    Alcohol use: No    Drug use: No    Sexual activity: Not on file   Lifestyle    Physical activity:     Days per week: Not on file     Minutes per session: Not on file    Stress: Not on file   Relationships  Social connections:     Talks on phone: Not on file     Gets together: Not on file     Attends [employer] service: Not on file     Active member of club or organization: Not on file     Attends meetings of clubs or organizations: Not on file     Relationship status: Not on file    Intimate partner violence:     Fear of current or ex partner: Not on file     Emotionally abused: Not on file     Physically abused: Not on file     Forced sexual activity: Not on file   Other Topics Concern    Not on file   Social History Narrative    Not on file      Family History   Problem Relation Age of Onset    No Known Problems Mother     Substance Abuse Neg Hx     Mental illness Neg Hx      Past Surgical History:   Procedure Laterality Date    ABSCESS DRAINAGE      Retroperitoneal abscess    ANASTOMOSIS  12/11/2013    Sigmoid colon    COLONOSCOPY      COLOSTOMY  12/11/2013    Temporary    ILEOSTOMY REVISION  02/11/2014    reversal    VASECTOMY         Current Outpatient Medications:     apixaban (ELIQUIS) 5 mg, Take 1 tablet (5 mg total) by mouth 2 (two) times a day, Disp: 180 tablet, Rfl: 3    diltiazem (CARDIZEM CD) 240 mg 24 hr capsule, TAKE 1 CAPSULE EVERY DAY, Disp: 90 capsule, Rfl: 0    flecainide (TAMBOCOR) 100 mg tablet, Take 1 tablet (100 mg total) by mouth 2 (two) times a day, Disp: 60 tablet, Rfl: 5    FLUoxetine (PROzac) 10 mg capsule, Take 1 capsule (10 mg total) by mouth daily, Disp: 90 capsule, Rfl: 1    glucosamine-chondroitin 500-400 MG tablet, Take 1 tablet by mouth 2 (two) times a day , Disp: , Rfl:     multivitamin-iron-minerals-folic acid (CENTRUM) chewable tablet, Chew 1 tablet daily  , Disp: , Rfl:     Probiotic Product (PROBIOTIC-10 PO), Take 10 mg by mouth daily, Disp: , Rfl:     Saw [place] 450 MG CAPS, Take 450 mg by mouth daily, Disp: , Rfl:   No Known Allergies    Labs:     Chemistry        Component Value Date/Time     12/08/2017 0953    K 4 4 06/18/2019 1016     06/18/2019 1016    CO2 25 06/18/2019 1016    BUN 12 06/18/2019 1016    CREATININE 1 13 06/18/2019 1016    CREATININE 1 19 12/08/2017 0953        Component Value Date/Time    CALCIUM 9 7 12/08/2017 0953    ALKPHOS 110 12/08/2017 0953    AST 16 06/18/2019 1016    ALT 9 06/18/2019 1016    BILITOT 0 6 12/08/2017 0953            Lab Results   Component Value Date    CHOL 216 (H) 12/08/2017    CHOL 230 (H) 11/14/2016    CHOL 208 (H) 09/03/2015     Lab Results   Component Value Date    HDL 56 06/18/2019    HDL 64 12/08/2017    HDL 69 11/14/2016     Lab Results   Component Value Date    LDLCALC 130 (H) 12/08/2017    LDLCALC 140 (H) 11/14/2016    LDLCALC 124 (H) 09/03/2015     Lab Results   Component Value Date    TRIG 132 06/18/2019    TRIG 111 12/08/2017    TRIG 107 11/14/2016     Lab Results   Component Value Date    CHOLHDL 3 4 06/18/2019       Imaging: [facility] Kidney And Bladder    Result Date: 7/11/2019  Narrative: RENAL ULTRASOUND INDICATION:   R31 0: Gross hematuria  COMPARISON: CT performed August 2, 2013  TECHNIQUE:   Ultrasound of the retroperitoneum was performed with a curvilinear transducer utilizing volumetric sweeps and still imaging techniques  FINDINGS: KIDNEYS: Symmetric and normal size  Right kidney:  11 2 cm  Left kidney:  10 4 cm  Right kidney Normal echogenicity and contour  No suspicious masses detected  No hydronephrosis  No shadowing calculi  No perinephric fluid collections  Left kidney Normal echogenicity and contour  No suspicious masses detected  No hydronephrosis  No shadowing calculi  No perinephric fluid collections  URETERS: Nonvisualized  BLADDER: Incompletely distended  No focal thickening or mass lesions  Bilateral ureteral jets detected  Prostate is enlarged measuring 5 6 x 4 8 x 5 6 cm with invagination of the central zone into the urinary bladder base  Impression: No sonographic abnormality to definitively account for hematuria  Normal sonographic appearance of the kidneys  Prostatomegaly  Workstation performed: [id number]         Review of Systems   Constitution: Negative  HENT: Negative  Eyes: Negative  Cardiovascular: Negative  Respiratory: Negative  Endocrine: Negative  Hematologic/Lymphatic: Negative  Skin: Negative  Musculoskeletal: Negative  Gastrointestinal: Negative  Genitourinary: Negative  Neurological: Negative  Psychiatric/Behavioral: Negative  Allergic/Immunologic: Negative  Vitals:    07/25/19 1009   BP: 120/76   Pulse: 67           Physical Exam   Constitutional: He is oriented to person, place, and time  No distress  HENT:   Mouth/Throat: No oropharyngeal exudate  Eyes: No scleral icterus  Neck: No JVD present  Cardiovascular: Normal rate and regular rhythm  Exam reveals no friction rub  No murmur heard  Pulmonary/Chest: Effort normal and breath sounds normal  No stridor  No respiratory distress  He has no wheezes  Abdominal: Soft  Bowel sounds are normal  He exhibits no distension  There is no tenderness  There is no guarding  Musculoskeletal: He exhibits no edema  Neurological: He is alert and oriented to person, place, and time  Skin: Skin is warm and dry  He is not diaphoretic  Psychiatric: He has a normal mood and affect  His behavior is normal        Discussion/Summary:    Persistent Atrial Fibrillation: He is in normal sinus rhythm  No changes to medical therapy  Resting EKG is normal      He is low risk / clear for any urological procedures  Eliquis can be held if needed up to 48 hours      The patient was counseled regarding diagnostic results, instructions for management, risk factor reductions, impressions  total time of encounter was 25 minutes and 15 minutes was spent counseling

## 2019-07-26 DIAGNOSIS — I48.91 ATRIAL FIBRILLATION, UNSPECIFIED TYPE (HCC): ICD-10-CM

## 2019-07-26 RX ORDER — FLECAINIDE ACETATE 100 MG/1
TABLET ORAL
Qty: 180 TABLET | Refills: 1 | Status: SHIPPED | OUTPATIENT
Start: 2019-07-26 | End: 2020-01-24

## 2019-09-14 DIAGNOSIS — I48.19 PERSISTENT ATRIAL FIBRILLATION (HCC): ICD-10-CM

## 2019-09-16 RX ORDER — DILTIAZEM HYDROCHLORIDE 240 MG/1
CAPSULE, COATED, EXTENDED RELEASE ORAL
Qty: 90 CAPSULE | Refills: 0 | Status: SHIPPED | OUTPATIENT
Start: 2019-09-16 | End: 2020-03-12 | Stop reason: SDUPTHER

## 2019-09-18 DIAGNOSIS — I48.19 PERSISTENT ATRIAL FIBRILLATION (HCC): ICD-10-CM

## 2019-09-18 RX ORDER — APIXABAN 5 MG/1
TABLET, FILM COATED ORAL
Qty: 180 TABLET | Refills: 3 | Status: SHIPPED | OUTPATIENT
Start: 2019-09-18 | End: 2020-09-16

## 2019-11-25 DIAGNOSIS — F33.41 RECURRENT MAJOR DEPRESSIVE DISORDER, IN PARTIAL REMISSION (HCC): ICD-10-CM

## 2019-11-25 RX ORDER — FLUOXETINE 10 MG/1
CAPSULE ORAL
Qty: 90 CAPSULE | Refills: 1 | Status: SHIPPED | OUTPATIENT
Start: 2019-11-25 | End: 2020-04-23

## 2020-01-23 ENCOUNTER — OFFICE VISIT (OUTPATIENT)
Dept: CARDIOLOGY CLINIC | Facility: CLINIC | Age: 80
End: 2020-01-23
Payer: COMMERCIAL

## 2020-01-23 VITALS
WEIGHT: 163 LBS | BODY MASS INDEX: 22.08 KG/M2 | HEART RATE: 63 BPM | HEIGHT: 72 IN | SYSTOLIC BLOOD PRESSURE: 120 MMHG | DIASTOLIC BLOOD PRESSURE: 68 MMHG

## 2020-01-23 DIAGNOSIS — I48.91 ATRIAL FIBRILLATION, UNSPECIFIED TYPE (HCC): Primary | ICD-10-CM

## 2020-01-23 PROCEDURE — 93000 ELECTROCARDIOGRAM COMPLETE: CPT | Performed by: INTERNAL MEDICINE

## 2020-01-23 PROCEDURE — 1160F RVW MEDS BY RX/DR IN RCRD: CPT | Performed by: INTERNAL MEDICINE

## 2020-01-23 PROCEDURE — 99214 OFFICE O/P EST MOD 30 MIN: CPT | Performed by: INTERNAL MEDICINE

## 2020-01-23 NOTE — PROGRESS NOTES
Cardiology Follow Up    Fei Maradiaga  1940  [id number]  CARDIOVASC PHYSICIAN  [facility] [place] [address]    1  Atrial fibrillation, unspecified type ([place] [address] )  POCT ECG       Interval History: Followup for atrial fibrillation    Doing well  No chest pain, no dyspnea and no palpitations  No issues with medical therapy       Problem List     Paroxysmal atrial fibrillation (HCC)    Basal cell carcinoma of skin    Benign neoplasm of large intestine    Recurrent major depressive disorder, in partial remission ([place] [address] )    Degenerative joint disease        Past Medical History:   Diagnosis Date    Atrial fibrillation ([place] [address] )     Basal cell carcinoma     last assessed 10/1/15, resolved 12/15/17    Benign neoplasm of large intestine     last assessed 11/22/16, resolved 12/15/17     Closed displaced fracture of shaft of right clavicle     last assessed 10/20/16, resolved 12/15/17     Diverticulitis of colon     last assessed 3/7/14, resolved 12/15/17     Dysphagia     last assessed 1/20/14, resolved 11/25/15    Melanoma in situ [facility])     last assessed 2/15/16, resolved 12/15/17      Social History     Socioeconomic History    Marital status: /Civil Union     Spouse name: Not on file    Number of children: Not on file    Years of education: Not on file    Highest education level: Not on file   Occupational History    Not on file   Social Needs    Financial resource strain: Not on file    Food insecurity:     Worry: Not on file     Inability: Not on file    Transportation needs:     Medical: Not on file     Non-medical: Not on file   Tobacco Use    Smoking status: Never Smoker    Smokeless tobacco: Never Used   Substance and Sexual Activity    Alcohol use: No    Drug use: No    Sexual activity: Not on file   Lifestyle    Physical activity:     Days per week: Not on file     Minutes per session: Not on file    Stress: Not on file   Relationships    Social connections:     Talks on phone: Not on file     Gets together: Not on file     Attends Zoroastrianism service: Not on file     Active member of club or organization: Not on file     Attends meetings of clubs or organizations: Not on file     Relationship status: Not on file    Intimate partner violence:     Fear of current or ex partner: Not on file     Emotionally abused: Not on file     Physically abused: Not on file     Forced sexual activity: Not on file   Other Topics Concern    Not on file   Social History Narrative    Not on file      Family History   Problem Relation Age of Onset    No Known Problems Mother     Substance Abuse Neg Hx     Mental illness Neg Hx      Past Surgical History:   Procedure Laterality Date    ABSCESS DRAINAGE      Retroperitoneal abscess    ANASTOMOSIS  12/11/2013    Sigmoid colon    COLONOSCOPY      COLOSTOMY  12/11/2013    Temporary    ILEOSTOMY REVISION  02/11/2014    reversal    VASECTOMY         Current Outpatient Medications:     diltiazem (CARDIZEM CD) 240 mg 24 hr capsule, TAKE 1 CAPSULE EVERY DAY, Disp: 90 capsule, Rfl: 0    ELIQUIS 5 MG, TAKE 1 TABLET BY MOUTH TWO  TIMES DAILY, Disp: 180 tablet, Rfl: 3    flecainide (TAMBOCOR) 100 mg tablet, TAKE 1 TABLET BY MOUTH TWICE A DAY, Disp: 180 tablet, Rfl: 1    FLUoxetine (PROzac) 10 mg capsule, TAKE 1 CAPSULE BY MOUTH EVERY DAY, Disp: 90 capsule, Rfl: 1    glucosamine-chondroitin 500-400 MG tablet, Take 1 tablet by mouth 2 (two) times a day , Disp: , Rfl:     multivitamin-iron-minerals-folic acid (CENTRUM) chewable tablet, Chew 1 tablet daily  , Disp: , Rfl:     Probiotic Product (PROBIOTIC-10 PO), Take 10 mg by mouth daily, Disp: , Rfl:     Saw [place] 450 MG CAPS, Take 450 mg by mouth daily, Disp: , Rfl:   No Known Allergies    Labs:     Chemistry        Component Value Date/Time     12/08/2017 0953    K 4 4 06/18/2019 1016     06/18/2019 1016    CO2 25 06/18/2019 1016    BUN 12 06/18/2019 1016 CREATININE 1 13 06/18/2019 1016    CREATININE 1 19 12/08/2017 0953        Component Value Date/Time    CALCIUM 9 7 12/08/2017 0953    ALKPHOS 110 12/08/2017 0953    AST 16 06/18/2019 1016    ALT 9 06/18/2019 1016    BILITOT 0 6 12/08/2017 0953            Lab Results   Component Value Date    CHOL 216 (H) 12/08/2017    CHOL 230 (H) 11/14/2016    CHOL 208 (H) 09/03/2015     Lab Results   Component Value Date    HDL 56 06/18/2019    HDL 64 12/08/2017    HDL 69 11/14/2016     Lab Results   Component Value Date    LDLCALC 130 (H) 12/08/2017    LDLCALC 140 (H) 11/14/2016    LDLCALC 124 (H) 09/03/2015     Lab Results   Component Value Date    TRIG 132 06/18/2019    TRIG 111 12/08/2017    TRIG 107 11/14/2016     Lab Results   Component Value Date    CHOLHDL 3 4 06/18/2019       Imaging: No results found  EKG: NSR  Normal ECG  Review of Systems   Constitution: Negative  HENT: Negative  Eyes: Negative  Cardiovascular: Negative  Respiratory: Negative  Endocrine: Negative  Hematologic/Lymphatic: Negative  Skin: Negative  Musculoskeletal: Negative  Gastrointestinal: Negative  Genitourinary: Negative  Neurological: Negative  Psychiatric/Behavioral: Negative  Allergic/Immunologic: Negative  Vitals:    01/23/20 0953   BP: 120/68   Pulse: 63           Physical Exam   Constitutional: He is oriented to person, place, and time  No distress  HENT:   Mouth/Throat: No oropharyngeal exudate  Eyes: No scleral icterus  Neck: No JVD present  Cardiovascular: Normal rate and regular rhythm  Exam reveals no friction rub  No murmur heard  Pulmonary/Chest: Effort normal and breath sounds normal  No stridor  No respiratory distress  He has no wheezes  Abdominal: Soft  Bowel sounds are normal  He exhibits no distension  There is no tenderness  Musculoskeletal: He exhibits no edema  Neurological: He is alert and oriented to person, place, and time  Skin: Skin is warm and dry   He is not diaphoretic  Psychiatric: He has a normal mood and affect  His behavior is normal        Discussion/Summary:    Persistent Atrial Fibrillation: He is in normal sinus rhythm  No changes to medical therapy  Resting EKG is normal        The patient was counseled regarding diagnostic results, instructions for management, risk factor reductions, impressions  total time of encounter was 25 minutes and 15 minutes was spent counseling

## 2020-01-24 DIAGNOSIS — I48.91 ATRIAL FIBRILLATION, UNSPECIFIED TYPE (HCC): ICD-10-CM

## 2020-01-24 RX ORDER — FLECAINIDE ACETATE 100 MG/1
TABLET ORAL
Qty: 180 TABLET | Refills: 0 | Status: SHIPPED | OUTPATIENT
Start: 2020-01-24 | End: 2020-04-20

## 2020-03-12 DIAGNOSIS — I48.19 PERSISTENT ATRIAL FIBRILLATION (HCC): ICD-10-CM

## 2020-03-12 RX ORDER — DILTIAZEM HYDROCHLORIDE 240 MG/1
240 CAPSULE, COATED, EXTENDED RELEASE ORAL DAILY
Qty: 90 CAPSULE | Refills: 3 | Status: SHIPPED | OUTPATIENT
Start: 2020-03-12 | End: 2021-01-18

## 2020-04-19 DIAGNOSIS — I48.91 ATRIAL FIBRILLATION, UNSPECIFIED TYPE (HCC): ICD-10-CM

## 2020-04-20 RX ORDER — FLECAINIDE ACETATE 100 MG/1
TABLET ORAL
Qty: 180 TABLET | Refills: 0 | Status: SHIPPED | OUTPATIENT
Start: 2020-04-20 | End: 2020-07-15

## 2020-04-23 DIAGNOSIS — F33.41 RECURRENT MAJOR DEPRESSIVE DISORDER, IN PARTIAL REMISSION (HCC): ICD-10-CM

## 2020-04-23 RX ORDER — FLUOXETINE 10 MG/1
CAPSULE ORAL
Qty: 90 CAPSULE | Refills: 1 | Status: SHIPPED | OUTPATIENT
Start: 2020-04-23 | End: 2020-10-26

## 2020-07-08 ENCOUNTER — TELEPHONE (OUTPATIENT)
Dept: FAMILY MEDICINE CLINIC | Facility: CLINIC | Age: 80
End: 2020-07-08

## 2020-07-08 NOTE — TELEPHONE ENCOUNTER
----- Message from Mahendra Flores MD sent at 7/4/2020 10:18 AM EDT -----  Regarding: MCW  Due please schedule- could be video, if pt requests- prefer OV to get BP, wgt ,etc

## 2020-07-15 DIAGNOSIS — I48.91 ATRIAL FIBRILLATION, UNSPECIFIED TYPE (HCC): ICD-10-CM

## 2020-07-15 RX ORDER — FLECAINIDE ACETATE 100 MG/1
TABLET ORAL
Qty: 180 TABLET | Refills: 0 | Status: SHIPPED | OUTPATIENT
Start: 2020-07-15 | End: 2020-09-29

## 2020-07-24 ENCOUNTER — OFFICE VISIT (OUTPATIENT)
Dept: CARDIOLOGY CLINIC | Facility: CLINIC | Age: 80
End: 2020-07-24
Payer: COMMERCIAL

## 2020-07-24 VITALS
HEART RATE: 64 BPM | WEIGHT: 161.6 LBS | DIASTOLIC BLOOD PRESSURE: 70 MMHG | SYSTOLIC BLOOD PRESSURE: 136 MMHG | HEIGHT: 72 IN | BODY MASS INDEX: 21.89 KG/M2 | TEMPERATURE: 98.1 F

## 2020-07-24 DIAGNOSIS — I48.19 PERSISTENT ATRIAL FIBRILLATION (HCC): Primary | ICD-10-CM

## 2020-07-24 PROCEDURE — 1036F TOBACCO NON-USER: CPT | Performed by: INTERNAL MEDICINE

## 2020-07-24 PROCEDURE — 3008F BODY MASS INDEX DOCD: CPT | Performed by: INTERNAL MEDICINE

## 2020-07-24 PROCEDURE — 99214 OFFICE O/P EST MOD 30 MIN: CPT | Performed by: INTERNAL MEDICINE

## 2020-07-24 PROCEDURE — 1160F RVW MEDS BY RX/DR IN RCRD: CPT | Performed by: INTERNAL MEDICINE

## 2020-07-24 PROCEDURE — 4040F PNEUMOC VAC/ADMIN/RCVD: CPT | Performed by: INTERNAL MEDICINE

## 2020-07-24 NOTE — PROGRESS NOTES
Cardiology Follow Up    Emelyn Francis  1940  [address] [facility] Matt Ontiveros  [address] [place] [id number] [phone]    1   Persistent atrial fibrillation (HCC)         Interval History: Followup Persistent AF    Problem List     Paroxysmal atrial fibrillation ([facility] )    Basal cell carcinoma of skin    Benign neoplasm of large intestine    Recurrent major depressive disorder, in partial remission ([facility] )    Degenerative joint disease        Past Medical History:   Diagnosis Date    Atrial fibrillation ([facility] )     Basal cell carcinoma     last assessed 10/1/15, resolved 12/15/17    Benign neoplasm of large intestine     last assessed 11/22/16, resolved 12/15/17     Closed displaced fracture of shaft of right clavicle     last assessed 10/20/16, resolved 12/15/17     Diverticulitis of colon     last assessed 3/7/14, resolved 12/15/17     Dysphagia     last assessed 1/20/14, resolved 11/25/15    Melanoma in situ ([facility] )     last assessed 2/15/16, resolved 12/15/17      Social History     Socioeconomic History    Marital status: /Civil Union     Spouse name: Not on file    Number of children: Not on file    Years of education: Not on file    Highest education level: Not on file   Occupational History    Not on file   Social Needs    Financial resource strain: Not on file    Food insecurity:     Worry: Not on file     Inability: Not on file    Transportation needs:     Medical: Not on file     Non-medical: Not on file   Tobacco Use    Smoking status: Never Smoker    Smokeless tobacco: Never Used   Substance and Sexual Activity    Alcohol use: No    Drug use: No    Sexual activity: Not on file   Lifestyle    Physical activity:     Days per week: Not on file     Minutes per session: Not on file    Stress: Not on file   Relationships    Social connections:     Talks on phone: Not on file     Gets together: Not on file     Attends Jewish service: Not on file     Active member of club or organization: Not on file     Attends meetings of clubs or organizations: Not on file     Relationship status: Not on file    Intimate partner violence:     Fear of current or ex partner: Not on file     Emotionally abused: Not on file     Physically abused: Not on file     Forced sexual activity: Not on file   Other Topics Concern    Not on file   Social History Narrative    Not on file      Family History   Problem Relation Age of Onset    No Known Problems Mother     Substance Abuse Neg Hx     Mental illness Neg Hx      Past Surgical History:   Procedure Laterality Date    ABSCESS DRAINAGE      Retroperitoneal abscess    ANASTOMOSIS  12/11/2013    Sigmoid colon    COLONOSCOPY      COLOSTOMY  12/11/2013    Temporary    ILEOSTOMY REVISION  02/11/2014    reversal    VASECTOMY         Current Outpatient Medications:     diltiazem (CARDIZEM CD) 240 mg 24 hr capsule, Take 1 capsule (240 mg total) by mouth daily, Disp: 90 capsule, Rfl: 3    ELIQUIS 5 MG, TAKE 1 TABLET BY MOUTH TWO  TIMES DAILY, Disp: 180 tablet, Rfl: 3    flecainide (TAMBOCOR) 100 mg tablet, TAKE 1 TABLET BY MOUTH TWICE A DAY, Disp: 180 tablet, Rfl: 0    FLUoxetine (PROzac) 10 mg capsule, TAKE 1 CAPSULE BY MOUTH EVERY DAY, Disp: 90 capsule, Rfl: 1    glucosamine-chondroitin 500-400 MG tablet, Take 1 tablet by mouth 2 (two) times a day , Disp: , Rfl:     multivitamin-iron-minerals-folic acid (CENTRUM) chewable tablet, Chew 1 tablet daily  , Disp: , Rfl:     Probiotic Product (PROBIOTIC-10 PO), Take 10 mg by mouth daily, Disp: , Rfl:     Saw [place] 450 MG CAPS, Take 450 mg by mouth daily, Disp: , Rfl:   No Known Allergies    Labs:     Chemistry        Component Value Date/Time     12/08/2017 0953    K 4 4 06/18/2019 1016     06/18/2019 1016    CO2 25 06/18/2019 1016    BUN 12 06/18/2019 1016    CREATININE 1 13 06/18/2019 1016 CREATININE 1 19 12/08/2017 0953        Component Value Date/Time    CALCIUM 9 7 12/08/2017 0953    ALKPHOS 110 12/08/2017 0953    AST 16 06/18/2019 1016    ALT 9 06/18/2019 1016    BILITOT 0 6 12/08/2017 0953            Lab Results   Component Value Date    CHOL 216 (H) 12/08/2017    CHOL 230 (H) 11/14/2016    CHOL 208 (H) 09/03/2015     Lab Results   Component Value Date    HDL 56 06/18/2019    HDL 64 12/08/2017    HDL 69 11/14/2016     Lab Results   Component Value Date    LDLCALC 108 (H) 06/18/2019    LDLCALC 130 (H) 12/08/2017    LDLCALC 140 (H) 11/14/2016     Lab Results   Component Value Date    TRIG 132 06/18/2019    TRIG 111 12/08/2017    TRIG 107 11/14/2016     Lab Results   Component Value Date    CHOLHDL 3 4 06/18/2019       Imaging: No results found  Review of Systems   Constitution: Negative  HENT: Negative  Eyes: Negative  Cardiovascular: Negative  Respiratory: Negative  Endocrine: Negative  Hematologic/Lymphatic: Negative  Skin: Negative  Musculoskeletal: Negative  Gastrointestinal: Negative  Genitourinary: Negative  Neurological: Negative  Psychiatric/Behavioral: Negative  Allergic/Immunologic: Negative  Vitals:    07/24/20 1021   BP: 136/70   Pulse: 64   Temp: 98 1 °F (36 7 °C)           Physical Exam   Constitutional: He is oriented to person, place, and time  He appears well-developed  No distress  HENT:   Head: Normocephalic  Mouth/Throat: No oropharyngeal exudate  Eyes: Conjunctivae are normal  No scleral icterus  Neck: Normal range of motion  No JVD present  Cardiovascular: Normal rate, regular rhythm and normal heart sounds  Exam reveals no friction rub  No murmur heard  Pulmonary/Chest: Effort normal and breath sounds normal  No stridor  No respiratory distress  He has no wheezes  Abdominal: Soft  Bowel sounds are normal  He exhibits no distension  There is no tenderness  There is no guarding     Musculoskeletal: Normal range of motion  He exhibits no edema, tenderness or deformity  Neurological: He is alert and oriented to person, place, and time  Skin: Skin is warm and dry  He is not diaphoretic  Psychiatric: He has a normal mood and affect  His behavior is normal  Thought content normal        Discussion/Summary:       Persistent Atrial Fibrillation: He is doing well  Continue with current medical therapy  He is doing well  No changes  The patient was counseled regarding diagnostic results, instructions for management, risk factor reductions, impressions  total time of encounter was 25 minutes and 15 minutes was spent counseling

## 2020-09-16 DIAGNOSIS — I48.19 PERSISTENT ATRIAL FIBRILLATION (HCC): ICD-10-CM

## 2020-09-16 RX ORDER — APIXABAN 5 MG/1
TABLET, FILM COATED ORAL
Qty: 180 TABLET | Refills: 3 | Status: SHIPPED | OUTPATIENT
Start: 2020-09-16 | End: 2021-01-21 | Stop reason: SDUPTHER

## 2020-09-27 DIAGNOSIS — I48.91 ATRIAL FIBRILLATION, UNSPECIFIED TYPE (HCC): ICD-10-CM

## 2020-09-27 DIAGNOSIS — F33.41 RECURRENT MAJOR DEPRESSIVE DISORDER, IN PARTIAL REMISSION (HCC): ICD-10-CM

## 2020-09-29 RX ORDER — FLECAINIDE ACETATE 100 MG/1
TABLET ORAL
Qty: 180 TABLET | Refills: 0 | Status: SHIPPED | OUTPATIENT
Start: 2020-09-29 | End: 2021-01-25 | Stop reason: SDUPTHER

## 2020-09-29 RX ORDER — FLUOXETINE 10 MG/1
CAPSULE ORAL
Qty: 90 CAPSULE | Refills: 1 | OUTPATIENT
Start: 2020-09-29

## 2020-10-26 ENCOUNTER — TELEPHONE (OUTPATIENT)
Dept: CARDIOLOGY CLINIC | Facility: CLINIC | Age: 80
End: 2020-10-26

## 2020-10-26 DIAGNOSIS — F33.41 RECURRENT MAJOR DEPRESSIVE DISORDER, IN PARTIAL REMISSION (HCC): ICD-10-CM

## 2020-10-26 RX ORDER — FLUOXETINE 10 MG/1
CAPSULE ORAL
Qty: 90 CAPSULE | Refills: 1 | Status: SHIPPED | OUTPATIENT
Start: 2020-10-26 | End: 2021-04-21

## 2020-10-28 ENCOUNTER — TELEMEDICINE (OUTPATIENT)
Dept: FAMILY MEDICINE CLINIC | Facility: CLINIC | Age: 80
End: 2020-10-28
Payer: COMMERCIAL

## 2020-10-28 DIAGNOSIS — F33.41 RECURRENT MAJOR DEPRESSIVE DISORDER, IN PARTIAL REMISSION (HCC): ICD-10-CM

## 2020-10-28 DIAGNOSIS — E78.2 HYPERLIPEMIA, MIXED: ICD-10-CM

## 2020-10-28 DIAGNOSIS — Z00.00 MEDICARE ANNUAL WELLNESS VISIT, SUBSEQUENT: Primary | ICD-10-CM

## 2020-10-28 DIAGNOSIS — I48.0 PAROXYSMAL ATRIAL FIBRILLATION (HCC): ICD-10-CM

## 2020-10-28 PROCEDURE — 1170F FXNL STATUS ASSESSED: CPT | Performed by: FAMILY MEDICINE

## 2020-10-28 PROCEDURE — 1125F AMNT PAIN NOTED PAIN PRSNT: CPT | Performed by: FAMILY MEDICINE

## 2020-10-28 PROCEDURE — 3725F SCREEN DEPRESSION PERFORMED: CPT | Performed by: FAMILY MEDICINE

## 2020-10-28 PROCEDURE — G0439 PPPS, SUBSEQ VISIT: HCPCS | Performed by: FAMILY MEDICINE

## 2021-01-17 DIAGNOSIS — I48.19 PERSISTENT ATRIAL FIBRILLATION (HCC): ICD-10-CM

## 2021-01-18 RX ORDER — DILTIAZEM HYDROCHLORIDE 240 MG/1
CAPSULE, COATED, EXTENDED RELEASE ORAL
Qty: 90 CAPSULE | Refills: 3 | Status: SHIPPED | OUTPATIENT
Start: 2021-01-18 | End: 2022-01-20

## 2021-01-20 DIAGNOSIS — Z23 ENCOUNTER FOR IMMUNIZATION: ICD-10-CM

## 2021-01-21 DIAGNOSIS — I48.19 PERSISTENT ATRIAL FIBRILLATION (HCC): ICD-10-CM

## 2021-01-25 DIAGNOSIS — I48.91 ATRIAL FIBRILLATION, UNSPECIFIED TYPE (HCC): ICD-10-CM

## 2021-01-25 RX ORDER — FLECAINIDE ACETATE 100 MG/1
100 TABLET ORAL 2 TIMES DAILY
Qty: 180 TABLET | Refills: 0 | Status: SHIPPED | OUTPATIENT
Start: 2021-01-25 | End: 2021-04-27

## 2021-01-27 ENCOUNTER — IMMUNIZATIONS (OUTPATIENT)
Dept: FAMILY MEDICINE CLINIC | Facility: HOSPITAL | Age: 81
End: 2021-01-27

## 2021-01-27 DIAGNOSIS — Z23 ENCOUNTER FOR IMMUNIZATION: Primary | ICD-10-CM

## 2021-01-27 PROCEDURE — 0011A SARS-COV-2 / COVID-19 MRNA VACCINE (MODERNA) 100 MCG: CPT

## 2021-01-27 PROCEDURE — 91301 SARS-COV-2 / COVID-19 MRNA VACCINE (MODERNA) 100 MCG: CPT

## 2021-02-23 ENCOUNTER — IMMUNIZATIONS (OUTPATIENT)
Dept: FAMILY MEDICINE CLINIC | Facility: HOSPITAL | Age: 81
End: 2021-02-23

## 2021-02-23 DIAGNOSIS — Z23 ENCOUNTER FOR IMMUNIZATION: Primary | ICD-10-CM

## 2021-02-23 PROCEDURE — 0012A SARS-COV-2 / COVID-19 MRNA VACCINE (MODERNA) 100 MCG: CPT

## 2021-02-23 PROCEDURE — 91301 SARS-COV-2 / COVID-19 MRNA VACCINE (MODERNA) 100 MCG: CPT

## 2021-03-12 ENCOUNTER — OFFICE VISIT (OUTPATIENT)
Dept: CARDIOLOGY CLINIC | Facility: CLINIC | Age: 81
End: 2021-03-12
Payer: COMMERCIAL

## 2021-03-12 VITALS
DIASTOLIC BLOOD PRESSURE: 70 MMHG | BODY MASS INDEX: 21.94 KG/M2 | SYSTOLIC BLOOD PRESSURE: 126 MMHG | HEIGHT: 72 IN | HEART RATE: 61 BPM | WEIGHT: 162 LBS

## 2021-03-12 DIAGNOSIS — I48.19 PERSISTENT ATRIAL FIBRILLATION (HCC): Primary | ICD-10-CM

## 2021-03-12 PROCEDURE — 93000 ELECTROCARDIOGRAM COMPLETE: CPT | Performed by: INTERNAL MEDICINE

## 2021-03-12 PROCEDURE — 1160F RVW MEDS BY RX/DR IN RCRD: CPT | Performed by: INTERNAL MEDICINE

## 2021-03-12 PROCEDURE — 99214 OFFICE O/P EST MOD 30 MIN: CPT | Performed by: INTERNAL MEDICINE

## 2021-03-12 NOTE — PROGRESS NOTES
Cardiology Follow Up    Paez Scriver  1940  [address] [facility] [address] [address] [phone]    1  Persistent atrial fibrillation (HCC)  POCT ECG       Interval History: Followup for Persistent Atrial Fibrillation    He is doing well  No symptomatic afib  No issues with his medical therapy       Problem List     Atrial fibrillation ([facility] )    Basal cell carcinoma of skin    Benign neoplasm of large intestine    Recurrent major depressive disorder, in partial remission ([facility] )    Degenerative joint disease    Prostate hyperplasia, benign localized, without urinary obstruction    Hyperlipemia, mixed        Past Medical History:   Diagnosis Date    [facility] ([facility] )     Basal cell carcinoma     last assessed 10/1/15, resolved 12/15/17    Benign neoplasm of large intestine     last assessed 11/22/16, resolved 12/15/17     Closed displaced fracture of shaft of right clavicle     last assessed 10/20/16, resolved 12/15/17     Diverticulitis of colon     last assessed 3/7/14, resolved 12/15/17     Dysphagia     last assessed 1/20/14, resolved 11/25/15    Melanoma in situ [facility])     last assessed 2/15/16, resolved 12/15/17      Social History     Socioeconomic History    Marital status: /Civil Union     Spouse name: Not on file    Number of children: Not on file    Years of education: Not on file    Highest education level: Not on file   Occupational History    Not on file   Social Needs    Financial resource strain: Not on file    Food insecurity     Worry: Not on file     Inability: Not on file   [employer] needs     Medical: Not on file     Non-medical: Not on file   Tobacco Use    Smoking status: Never Smoker    Smokeless tobacco: Never Used   Substance and Sexual Activity    Alcohol use: No    Drug use: No    Sexual activity: Not on file   Lifestyle    Physical activity     Days per week: Not on file     Minutes per session: Not on file    Stress: Not on file   Relationships    Social connections     Talks on phone: Not on file     Gets together: Not on file     Attends Spiritism service: Not on file     Active member of club or organization: Not on file     Attends meetings of clubs or organizations: Not on file     Relationship status: Not on file    Intimate partner violence     Fear of current or ex partner: Not on file     Emotionally abused: Not on file     Physically abused: Not on file     Forced sexual activity: Not on file   Other Topics Concern    Not on file   Social History Narrative    Not on file      Family History   Problem Relation Age of Onset    No Known Problems Mother     Substance Abuse Neg Hx     Mental illness Neg Hx      Past Surgical History:   Procedure Laterality Date    ABSCESS DRAINAGE      Retroperitoneal abscess    ANASTOMOSIS  12/11/2013    Sigmoid colon    COLONOSCOPY      COLOSTOMY  12/11/2013    Temporary    ILEOSTOMY REVISION  02/11/2014    reversal    VASECTOMY         Current Outpatient Medications:     apixaban (Eliquis) 5 mg, Take 1 tablet (5 mg total) by mouth 2 (two) times a day, Disp: 180 tablet, Rfl: 3    diltiazem (CARDIZEM CD) 240 mg 24 hr capsule, TAKE 1 CAPSULE BY MOUTH EVERY DAY, Disp: 90 capsule, Rfl: 3    flecainide (TAMBOCOR) 100 mg tablet, Take 1 tablet (100 mg total) by mouth 2 (two) times a day, Disp: 180 tablet, Rfl: 0    FLUoxetine (PROzac) 10 mg capsule, TAKE 1 CAPSULE BY MOUTH EVERY DAY, Disp: 90 capsule, Rfl: 1    glucosamine-chondroitin 500-400 MG tablet, Take 1 tablet by mouth 2 (two) times a day , Disp: , Rfl:     multivitamin-iron-minerals-folic acid (CENTRUM) chewable tablet, Chew 1 tablet daily  , Disp: , Rfl:     Probiotic Product (PROBIOTIC-10 PO), Take 10 mg by mouth daily, Disp: , Rfl:     Saw [place] 450 MG CAPS, Take 450 mg by mouth daily, Disp: , Rfl:   No Known Allergies    Labs:     Chemistry        Component Value Date/Time     12/08/2017 0953    K 4 4 06/18/2019 1016     06/18/2019 1016    CO2 25 06/18/2019 1016    BUN 12 06/18/2019 1016    CREATININE 1 13 06/18/2019 1016    CREATININE 1 19 12/08/2017 0953        Component Value Date/Time    CALCIUM 9 7 12/08/2017 0953    ALKPHOS 110 12/08/2017 0953    AST 16 06/18/2019 1016    ALT 9 06/18/2019 1016    BILITOT 0 6 12/08/2017 0953            Lab Results   Component Value Date    CHOL 216 (H) 12/08/2017    CHOL 230 (H) 11/14/2016    CHOL 208 (H) 09/03/2015     Lab Results   Component Value Date    HDL 56 06/18/2019    HDL 64 12/08/2017    HDL 69 11/14/2016     Lab Results   Component Value Date    LDLCALC 108 (H) 06/18/2019    LDLCALC 130 (H) 12/08/2017    LDLCALC 140 (H) 11/14/2016     Lab Results   Component Value Date    TRIG 132 06/18/2019    TRIG 111 12/08/2017    TRIG 107 11/14/2016     Lab Results   Component Value Date    CHOLHDL 3 4 06/18/2019       Imaging: No results found  EKG: NSR LAFB    Review of Systems   Constitution: Negative  HENT: Negative  Eyes: Negative  Cardiovascular: Negative  Respiratory: Negative  Endocrine: Negative  Hematologic/Lymphatic: Negative  Skin: Negative  Musculoskeletal: Negative  Gastrointestinal: Negative  Genitourinary: Negative  Neurological: Negative  Psychiatric/Behavioral: Negative  Allergic/Immunologic: Negative  Vitals:    03/12/21 0952   BP: 126/70   Pulse: 61           Physical Exam  Vitals signs reviewed  Constitutional:       Appearance: Normal appearance  HENT:      Head: Normocephalic  Nose: Nose normal       Mouth/Throat:      Mouth: Mucous membranes are moist       Pharynx: Oropharynx is clear  Eyes:      General: No scleral icterus  Conjunctiva/sclera: Conjunctivae normal    Neck:      Musculoskeletal: Normal range of motion and neck supple     Cardiovascular:      Rate and Rhythm: Normal rate and regular rhythm  Heart sounds: No murmur  No friction rub  No gallop  Pulmonary:      Effort: Pulmonary effort is normal  No respiratory distress  Breath sounds: Normal breath sounds  No wheezing or rales  Abdominal:      General: Abdomen is flat  Bowel sounds are normal  There is no distension  Palpations: Abdomen is soft  Tenderness: There is no abdominal tenderness  There is no guarding  Musculoskeletal:      Right lower leg: No edema  Left lower leg: No edema  Skin:     General: Skin is warm and dry  Neurological:      General: No focal deficit present  Mental Status: He is alert and oriented to person, place, and time  Psychiatric:         Mood and Affect: Mood normal          Behavior: Behavior normal          Discussion/Summary:    Persistent Atrial Fibrillation: He has not had any recent symptomatic afib  Continue with current medical therapy  No changes today  The patient was counseled regarding diagnostic results, instructions for management, risk factor reductions, impressions  total time of encounter was 25 minutes and 15 minutes was spent counseling

## 2021-04-21 DIAGNOSIS — F33.41 RECURRENT MAJOR DEPRESSIVE DISORDER, IN PARTIAL REMISSION (HCC): ICD-10-CM

## 2021-04-21 RX ORDER — FLUOXETINE 10 MG/1
CAPSULE ORAL
Qty: 90 CAPSULE | Refills: 1 | Status: SHIPPED | OUTPATIENT
Start: 2021-04-21 | End: 2021-10-22

## 2021-04-27 DIAGNOSIS — I48.91 ATRIAL FIBRILLATION, UNSPECIFIED TYPE (HCC): ICD-10-CM

## 2021-04-27 RX ORDER — FLECAINIDE ACETATE 100 MG/1
TABLET ORAL
Qty: 180 TABLET | Refills: 3 | Status: SHIPPED | OUTPATIENT
Start: 2021-04-27 | End: 2022-04-18

## 2021-05-27 LAB
ALBUMIN SERPL-MCNC: 4.5 G/DL (ref 3.6–4.6)
ALBUMIN/GLOB SERPL: 1.7 {RATIO} (ref 1.2–2.2)
ALP SERPL-CCNC: 114 IU/L (ref 48–121)
ALT SERPL-CCNC: 9 IU/L (ref 0–44)
AST SERPL-CCNC: 18 IU/L (ref 0–40)
BASOPHILS # BLD AUTO: 0.1 X10E3/UL (ref 0–0.2)
BASOPHILS NFR BLD AUTO: 1 %
BILIRUB SERPL-MCNC: 0.5 MG/DL (ref 0–1.2)
BUN SERPL-MCNC: 16 MG/DL (ref 8–27)
BUN/CREAT SERPL: 15 (ref 10–24)
CALCIUM SERPL-MCNC: 9.8 MG/DL (ref 8.6–10.2)
CHLORIDE SERPL-SCNC: 103 MMOL/L (ref 96–106)
CHOLEST SERPL-MCNC: 204 MG/DL (ref 100–199)
CHOLEST/HDLC SERPL: 3.3 RATIO (ref 0–5)
CO2 SERPL-SCNC: 27 MMOL/L (ref 20–29)
CREAT SERPL-MCNC: 1.1 MG/DL (ref 0.76–1.27)
EOSINOPHIL # BLD AUTO: 0.2 X10E3/UL (ref 0–0.4)
EOSINOPHIL NFR BLD AUTO: 2 %
ERYTHROCYTE [DISTWIDTH] IN BLOOD BY AUTOMATED COUNT: 12.9 % (ref 11.6–15.4)
GLOBULIN SER-MCNC: 2.7 G/DL (ref 1.5–4.5)
GLUCOSE SERPL-MCNC: 93 MG/DL (ref 65–99)
HCT VFR BLD AUTO: 44 % (ref 37.5–51)
HDLC SERPL-MCNC: 61 MG/DL
HGB BLD-MCNC: 14.7 G/DL (ref 13–17.7)
IMM GRANULOCYTES # BLD: 0 X10E3/UL (ref 0–0.1)
IMM GRANULOCYTES NFR BLD: 0 %
LDLC SERPL DIRECT ASSAY-MCNC: 124 MG/DL (ref 0–99)
LYMPHOCYTES # BLD AUTO: 2.9 X10E3/UL (ref 0.7–3.1)
LYMPHOCYTES NFR BLD AUTO: 33 %
MCH RBC QN AUTO: 30.8 PG (ref 26.6–33)
MCHC RBC AUTO-ENTMCNC: 33.4 G/DL (ref 31.5–35.7)
MCV RBC AUTO: 92 FL (ref 79–97)
MONOCYTES # BLD AUTO: 1 X10E3/UL (ref 0.1–0.9)
MONOCYTES NFR BLD AUTO: 12 %
NEUTROPHILS # BLD AUTO: 4.8 X10E3/UL (ref 1.4–7)
NEUTROPHILS NFR BLD AUTO: 52 %
PLATELET # BLD AUTO: 379 X10E3/UL (ref 150–450)
POTASSIUM SERPL-SCNC: 4.3 MMOL/L (ref 3.5–5.2)
PROT SERPL-MCNC: 7.2 G/DL (ref 6–8.5)
RBC # BLD AUTO: 4.77 X10E6/UL (ref 4.14–5.8)
SL AMB EGFR AFRICAN AMERICAN: 72 ML/MIN/1.73
SL AMB EGFR NON AFRICAN AMERICAN: 62 ML/MIN/1.73
SODIUM SERPL-SCNC: 141 MMOL/L (ref 134–144)
TRIGL SERPL-MCNC: 99 MG/DL (ref 0–149)
WBC # BLD AUTO: 8.9 X10E3/UL (ref 3.4–10.8)

## 2021-06-09 ENCOUNTER — TELEPHONE (OUTPATIENT)
Dept: CARDIOLOGY CLINIC | Facility: CLINIC | Age: 81
End: 2021-06-09

## 2021-06-09 NOTE — TELEPHONE ENCOUNTER
----- Message from Lexy Franco MD sent at 6/2/2021 11:08 AM EDT -----  Mildly elevated ldl   rec dietary changes if possible

## 2021-10-15 ENCOUNTER — OFFICE VISIT (OUTPATIENT)
Dept: CARDIOLOGY CLINIC | Facility: CLINIC | Age: 81
End: 2021-10-15
Payer: COMMERCIAL

## 2021-10-15 VITALS
HEART RATE: 59 BPM | HEIGHT: 72 IN | DIASTOLIC BLOOD PRESSURE: 60 MMHG | WEIGHT: 161 LBS | BODY MASS INDEX: 21.81 KG/M2 | SYSTOLIC BLOOD PRESSURE: 124 MMHG

## 2021-10-15 DIAGNOSIS — I48.91 ATRIAL FIBRILLATION, UNSPECIFIED TYPE (HCC): Primary | ICD-10-CM

## 2021-10-15 PROCEDURE — 99214 OFFICE O/P EST MOD 30 MIN: CPT | Performed by: INTERNAL MEDICINE

## 2021-10-15 PROCEDURE — 1036F TOBACCO NON-USER: CPT | Performed by: INTERNAL MEDICINE

## 2021-10-15 PROCEDURE — 93000 ELECTROCARDIOGRAM COMPLETE: CPT | Performed by: INTERNAL MEDICINE

## 2021-10-15 PROCEDURE — 1160F RVW MEDS BY RX/DR IN RCRD: CPT | Performed by: INTERNAL MEDICINE

## 2021-10-21 ENCOUNTER — HOSPITAL ENCOUNTER (OUTPATIENT)
Dept: NON INVASIVE DIAGNOSTICS | Facility: CLINIC | Age: 81
Discharge: HOME | End: 2021-10-21
Payer: COMMERCIAL

## 2021-10-21 VITALS
DIASTOLIC BLOOD PRESSURE: 60 MMHG | BODY MASS INDEX: 21.81 KG/M2 | HEIGHT: 72 IN | WEIGHT: 161 LBS | SYSTOLIC BLOOD PRESSURE: 124 MMHG

## 2021-10-21 DIAGNOSIS — I48.91 ATRIAL FIBRILLATION, UNSPECIFIED TYPE (HCC): ICD-10-CM

## 2021-10-21 LAB
AORTIC ROOT: 2.9 CM
APICAL FOUR CHAMBER EJECTION FRACTION: 71 %
E WAVE DECELERATION TIME: 207 MS
FRACTIONAL SHORTENING: 34 % (ref 28–44)
INTERVENTRICULAR SEPTUM IN DIASTOLE (PARASTERNAL SHORT AXIS VIEW): 1 CM
LAAS-AP2: 31.8 CM2
LAAS-AP4: 19.5 CM2
LEFT INTERNAL DIMENSION IN SYSTOLE: 3.1 CM (ref 2.1–4)
LEFT VENTRICLE DIASTOLIC VOLUME (MOD BIPLANE): 132 ML
LEFT VENTRICLE SYSTOLIC VOLUME (MOD BIPLANE): 47 ML
LEFT VENTRICULAR INTERNAL DIMENSION IN DIASTOLE: 4.7 CM (ref 4.44–6.61)
LEFT VENTRICULAR POSTERIOR WALL IN END DIASTOLE: 1 CM
LEFT VENTRICULAR STROKE VOLUME: 64 ML
LV EF: 58 %
MV E'TISSUE VEL-SEP: 9 CM/S
MV PEAK A VEL: 0.84 M/S
MV PEAK E VEL: 102 CM/S
MV STENOSIS PRESSURE HALF TIME: 0 MS
RIGHT VENTRICLE ID DIMENSION: 3.8 CM
SL CV LV EF: 65
SL CV PED ECHO LEFT VENTRICLE DIASTOLIC VOLUME (MOD BIPLANE) 2D: 103 ML
SL CV PED ECHO LEFT VENTRICLE SYSTOLIC VOLUME (MOD BIPLANE) 2D: 39 ML
TR PEAK VELOCITY: 2.5 M/S
TRICUSPID VALVE PEAK REGURGITATION VELOCITY: 2.45 M/S
TRICUSPID VALVE S': 60 CM/S
TV PEAK GRADIENT: 24 MMHG
Z-SCORE OF LEFT VENTRICULAR DIMENSION IN END SYSTOLE: -1.42

## 2021-10-21 PROCEDURE — 93306 TTE W/DOPPLER COMPLETE: CPT

## 2021-10-21 PROCEDURE — 93306 TTE W/DOPPLER COMPLETE: CPT | Performed by: INTERNAL MEDICINE

## 2021-10-22 DIAGNOSIS — F33.41 RECURRENT MAJOR DEPRESSIVE DISORDER, IN PARTIAL REMISSION (HCC): ICD-10-CM

## 2021-10-22 RX ORDER — FLUOXETINE 10 MG/1
CAPSULE ORAL
Qty: 30 CAPSULE | Refills: 0 | Status: SHIPPED | OUTPATIENT
Start: 2021-10-22 | End: 2021-12-02 | Stop reason: SDUPTHER

## 2021-10-25 ENCOUNTER — TELEPHONE (OUTPATIENT)
Dept: CARDIOLOGY CLINIC | Facility: CLINIC | Age: 81
End: 2021-10-25

## 2021-11-13 DIAGNOSIS — I48.19 PERSISTENT ATRIAL FIBRILLATION (HCC): ICD-10-CM

## 2021-11-16 RX ORDER — APIXABAN 5 MG/1
TABLET, FILM COATED ORAL
Qty: 180 TABLET | Refills: 3 | Status: SHIPPED | OUTPATIENT
Start: 2021-11-16

## 2021-11-22 DIAGNOSIS — E78.2 HYPERLIPEMIA, MIXED: ICD-10-CM

## 2021-11-22 DIAGNOSIS — I48.0 PAROXYSMAL ATRIAL FIBRILLATION (HCC): Primary | ICD-10-CM

## 2021-11-23 LAB
ALBUMIN SERPL-MCNC: 4.4 G/DL (ref 3.6–4.6)
ALBUMIN/GLOB SERPL: 1.8 {RATIO} (ref 1.2–2.2)
ALP SERPL-CCNC: 96 IU/L (ref 44–121)
ALT SERPL-CCNC: 11 IU/L (ref 0–44)
AST SERPL-CCNC: 16 IU/L (ref 0–40)
BILIRUB SERPL-MCNC: 0.5 MG/DL (ref 0–1.2)
BUN SERPL-MCNC: 17 MG/DL (ref 8–27)
BUN/CREAT SERPL: 15 (ref 10–24)
CALCIUM SERPL-MCNC: 9.7 MG/DL (ref 8.6–10.2)
CHLORIDE SERPL-SCNC: 104 MMOL/L (ref 96–106)
CHOLEST SERPL-MCNC: 213 MG/DL (ref 100–199)
CO2 SERPL-SCNC: 26 MMOL/L (ref 20–29)
CREAT SERPL-MCNC: 1.13 MG/DL (ref 0.76–1.27)
GLOBULIN SER-MCNC: 2.4 G/DL (ref 1.5–4.5)
GLUCOSE SERPL-MCNC: 91 MG/DL (ref 65–99)
HDLC SERPL-MCNC: 65 MG/DL
LDLC SERPL CALC-MCNC: 129 MG/DL (ref 0–99)
LDLC/HDLC SERPL: 2 RATIO (ref 0–3.6)
POTASSIUM SERPL-SCNC: 4.4 MMOL/L (ref 3.5–5.2)
PROT SERPL-MCNC: 6.8 G/DL (ref 6–8.5)
SL AMB EGFR AFRICAN AMERICAN: 70 ML/MIN/1.73
SL AMB EGFR NON AFRICAN AMERICAN: 60 ML/MIN/1.73
SL AMB VLDL CHOLESTEROL CALC: 19 MG/DL (ref 5–40)
SODIUM SERPL-SCNC: 143 MMOL/L (ref 134–144)
TRIGL SERPL-MCNC: 109 MG/DL (ref 0–149)

## 2021-12-02 ENCOUNTER — TELEPHONE (OUTPATIENT)
Dept: ADMINISTRATIVE | Facility: OTHER | Age: 81
End: 2021-12-02

## 2021-12-02 ENCOUNTER — OFFICE VISIT (OUTPATIENT)
Dept: FAMILY MEDICINE CLINIC | Facility: CLINIC | Age: 81
End: 2021-12-02
Payer: COMMERCIAL

## 2021-12-02 VITALS
WEIGHT: 163 LBS | TEMPERATURE: 98.2 F | HEIGHT: 72 IN | DIASTOLIC BLOOD PRESSURE: 64 MMHG | BODY MASS INDEX: 22.08 KG/M2 | SYSTOLIC BLOOD PRESSURE: 120 MMHG | OXYGEN SATURATION: 97 % | HEART RATE: 66 BPM

## 2021-12-02 DIAGNOSIS — I48.0 PAROXYSMAL ATRIAL FIBRILLATION (HCC): ICD-10-CM

## 2021-12-02 DIAGNOSIS — D03.39 MELANOMA IN SITU OF NOSE (HCC): ICD-10-CM

## 2021-12-02 DIAGNOSIS — Z00.00 MEDICARE ANNUAL WELLNESS VISIT, SUBSEQUENT: Primary | ICD-10-CM

## 2021-12-02 DIAGNOSIS — F33.41 RECURRENT MAJOR DEPRESSIVE DISORDER, IN PARTIAL REMISSION (HCC): ICD-10-CM

## 2021-12-02 DIAGNOSIS — N40.0 PROSTATE HYPERPLASIA, BENIGN LOCALIZED, WITHOUT URINARY OBSTRUCTION: ICD-10-CM

## 2021-12-02 DIAGNOSIS — E78.2 HYPERLIPEMIA, MIXED: ICD-10-CM

## 2021-12-02 PROCEDURE — 1036F TOBACCO NON-USER: CPT | Performed by: FAMILY MEDICINE

## 2021-12-02 PROCEDURE — 1125F AMNT PAIN NOTED PAIN PRSNT: CPT | Performed by: FAMILY MEDICINE

## 2021-12-02 PROCEDURE — 99214 OFFICE O/P EST MOD 30 MIN: CPT | Performed by: FAMILY MEDICINE

## 2021-12-02 PROCEDURE — 1160F RVW MEDS BY RX/DR IN RCRD: CPT | Performed by: FAMILY MEDICINE

## 2021-12-02 PROCEDURE — 1170F FXNL STATUS ASSESSED: CPT | Performed by: FAMILY MEDICINE

## 2021-12-02 PROCEDURE — 3288F FALL RISK ASSESSMENT DOCD: CPT | Performed by: FAMILY MEDICINE

## 2021-12-02 PROCEDURE — 3725F SCREEN DEPRESSION PERFORMED: CPT | Performed by: FAMILY MEDICINE

## 2021-12-02 PROCEDURE — G0439 PPPS, SUBSEQ VISIT: HCPCS | Performed by: FAMILY MEDICINE

## 2021-12-02 RX ORDER — FINASTERIDE 5 MG/1
1 TABLET, FILM COATED ORAL DAILY
COMMUNITY
Start: 2021-10-22 | End: 2021-12-02

## 2021-12-02 RX ORDER — FLUOXETINE 10 MG/1
10 CAPSULE ORAL DAILY
Qty: 90 CAPSULE | Refills: 3 | Status: SHIPPED | OUTPATIENT
Start: 2021-12-02

## 2021-12-02 RX ORDER — SELENIUM 50 MCG
TABLET ORAL
COMMUNITY

## 2021-12-02 NOTE — TELEPHONE ENCOUNTER
----- Message from Pati Mathis MA sent at 12/2/2021 10:31 AM EST -----  Regarding: covid booster  Patient  covid booster on 10/22/2021   Vaccine record copy to be enter in patient chart

## 2022-01-20 DIAGNOSIS — I48.19 PERSISTENT ATRIAL FIBRILLATION (HCC): ICD-10-CM

## 2022-01-20 RX ORDER — DILTIAZEM HYDROCHLORIDE 240 MG/1
CAPSULE, COATED, EXTENDED RELEASE ORAL
Qty: 90 CAPSULE | Refills: 3 | Status: SHIPPED | OUTPATIENT
Start: 2022-01-20

## 2022-01-20 NOTE — TELEPHONE ENCOUNTER
Upon review of the In Basket request we were able to locate, review, and update the patient chart as requested for Immunization(s) covid boooster  Any additional questions or concerns should be emailed to the Practice Liaisons via [email or web address] email, please do not reply via In Basket      Thank you  Lloyd Antonio MA

## 2022-05-26 ENCOUNTER — OFFICE VISIT (OUTPATIENT)
Dept: CARDIOLOGY CLINIC | Facility: CLINIC | Age: 82
End: 2022-05-26
Payer: COMMERCIAL

## 2022-05-26 VITALS
BODY MASS INDEX: 22.11 KG/M2 | SYSTOLIC BLOOD PRESSURE: 130 MMHG | HEART RATE: 63 BPM | HEIGHT: 72 IN | WEIGHT: 163.2 LBS | DIASTOLIC BLOOD PRESSURE: 62 MMHG

## 2022-05-26 DIAGNOSIS — I48.19 PERSISTENT ATRIAL FIBRILLATION (HCC): Primary | ICD-10-CM

## 2022-05-26 PROCEDURE — 99214 OFFICE O/P EST MOD 30 MIN: CPT | Performed by: INTERNAL MEDICINE

## 2022-05-26 PROCEDURE — 93000 ELECTROCARDIOGRAM COMPLETE: CPT | Performed by: INTERNAL MEDICINE

## 2022-05-26 PROCEDURE — 1160F RVW MEDS BY RX/DR IN RCRD: CPT | Performed by: INTERNAL MEDICINE

## 2022-05-26 NOTE — PROGRESS NOTES
Cardiology Follow Up    Meño Roberts  1940  [id number]  [facility] [address] [address]  [phone]    1  Persistent atrial fibrillation (HCC)  POCT ECG       Interval History:     Followup afib  Doing well  No chest pain, dyspnea, or palpitations  Overall he is doing well  Medical Problems             Problem List     Paroxysmal atrial fibrillation (HCC)    Basal cell carcinoma of skin    Benign neoplasm of large intestine    Recurrent major depressive disorder, in partial remission ([facility] )    Degenerative joint disease    Prostate hyperplasia, benign localized, without urinary obstruction    Hyperlipemia, mixed    Melanoma in situ of nose [facility])    Overview Signed 12/2/2021 10:44 AM by Rosario Kingston MD     Remote history of melanoma  Had excision  Sees dermatologist yearly for surveillance  Patient believes it was taken off 5 years ago                       Past Medical History:   Diagnosis Date    A-fib [facility])     Atrial fibrillation ([facility] )     Basal cell carcinoma     last assessed 10/1/15, resolved 12/15/17    Benign neoplasm of large intestine     last assessed 11/22/16, resolved 12/15/17     Closed displaced fracture of shaft of right clavicle     last assessed 10/20/16, resolved 12/15/17     Diverticulitis of colon     last assessed 3/7/14, resolved 12/15/17     Dysphagia     last assessed 1/20/14, resolved 11/25/15    Hyperlipidemia     Melanoma in situ [facility])     last assessed 2/15/16, resolved 12/15/17      Social History     Socioeconomic History    Marital status: /Civil Union     Spouse name: Not on file    Number of children: Not on file    Years of education: Not on file    Highest education level: Not on file   Occupational History    Not on file   Tobacco Use    Smoking status: Never Smoker    Smokeless tobacco: Never Used   Substance and Sexual Activity    Alcohol use: No    Drug use: No    Sexual activity: Not on file   Other Topics Concern    Not on file   Social History Narrative    Not on file     Social Determinants of Health     Financial Resource Strain: Not on file   Food Insecurity: Not on file   Transportation Needs: Not on file   Physical Activity: Not on file   Stress: Not on file   Social Connections: Not on file   Intimate Partner Violence: Not on file   Housing Stability: Not on file      Family History   Problem Relation Age of Onset    No Known Problems Mother     Substance Abuse Neg Hx     Mental illness Neg Hx      Past Surgical History:   Procedure Laterality Date    ABSCESS DRAINAGE      Retroperitoneal abscess    ANASTOMOSIS  12/11/2013    Sigmoid colon    COLONOSCOPY      COLOSTOMY  12/11/2013    Temporary    ILEOSTOMY REVISION  02/11/2014    reversal    VASECTOMY         Current Outpatient Medications:     diltiazem (CARDIZEM CD) 240 mg 24 hr capsule, TAKE 1 CAPSULE BY MOUTH EVERY DAY, Disp: 90 capsule, Rfl: 3    Eliquis 5 MG, TAKE 1 TABLET BY MOUTH  TWICE DAILY, Disp: 180 tablet, Rfl: 3    flecainide (TAMBOCOR) 100 mg tablet, TAKE 1 TABLET BY MOUTH TWICE A DAY, Disp: 180 tablet, Rfl: 1    FLUoxetine (PROzac) 10 mg capsule, Take 1 capsule (10 mg total) by mouth daily, Disp: 90 capsule, Rfl: 3    glucosamine-chondroitin 500-400 MG tablet, Take 1 tablet by mouth 2 (two) times a day , Disp: , Rfl:     lactobacillus acidophilus, one capsule daily, Disp: , Rfl:     multivitamin-iron-minerals-folic acid (CENTRUM) chewable tablet, Chew 1 tablet daily  , Disp: , Rfl:     Probiotic Product (PROBIOTIC-10 PO), Take 10 mg by mouth daily, Disp: , Rfl:     psyllium (METAMUCIL) 58 6 % powder, Take 1 packet by mouth 3 (three) times a day, Disp: , Rfl:     Saw [place] 450 MG CAPS, Take 450 mg by mouth daily, Disp: , Rfl:   No Known Allergies    Labs:     Chemistry        Component Value Date/Time     12/08/2017 0953    K 4 4 11/22/2021 0921     11/22/2021 0921    CO2 26 11/22/2021 0921    BUN 17 11/22/2021 0921    CREATININE 1 13 11/22/2021 0921    CREATININE 1 19 12/08/2017 0953        Component Value Date/Time    CALCIUM 9 7 12/08/2017 0953    ALKPHOS 110 12/08/2017 0953    AST 16 11/22/2021 0921    ALT 11 11/22/2021 0921    BILITOT 0 6 12/08/2017 0953            Lab Results   Component Value Date    CHOL 216 (H) 12/08/2017    CHOL 230 (H) 11/14/2016    CHOL 208 (H) 09/03/2015     Lab Results   Component Value Date    HDL 65 11/22/2021    HDL 61 05/26/2021    HDL 56 06/18/2019     Lab Results   Component Value Date    LDLCALC 129 (H) 11/22/2021    LDLCALC 108 (H) 06/18/2019    LDLCALC 130 (H) 12/08/2017     Lab Results   Component Value Date    TRIG 109 11/22/2021    TRIG 99 05/26/2021    TRIG 132 06/18/2019     Lab Results   Component Value Date    CHOLHDL 3 3 05/26/2021    CHOLHDL 3 4 06/18/2019       Imaging: No results found  EKG: Normal Sinus Rhythm LAFB     Review of Systems   Constitutional: Negative  HENT: Negative  Eyes: Negative  Cardiovascular: Negative  Respiratory: Negative  Endocrine: Negative  Hematologic/Lymphatic: Negative  Skin: Negative  Musculoskeletal: Negative  Gastrointestinal: Negative  Genitourinary: Negative  Neurological: Negative  Psychiatric/Behavioral: Negative  Allergic/Immunologic: Negative  Vitals:    05/26/22 0940   BP: 130/62   Pulse: 63           Physical Exam  Vitals reviewed  Constitutional:       Appearance: Normal appearance  HENT:      Head: Normocephalic  Nose: Nose normal       Mouth/Throat:      Mouth: Mucous membranes are moist       Pharynx: Oropharynx is clear  Eyes:      General: No scleral icterus  Conjunctiva/sclera: Conjunctivae normal    Cardiovascular:      Rate and Rhythm: Normal rate and regular rhythm  Heart sounds: No murmur heard  No friction rub  No gallop     Pulmonary:      Effort: Pulmonary effort is normal  No respiratory distress  Breath sounds: Normal breath sounds  No wheezing or rales  Abdominal:      General: Abdomen is flat  Bowel sounds are normal  There is no distension  Palpations: Abdomen is soft  Tenderness: There is no abdominal tenderness  There is no guarding  Musculoskeletal:      Cervical back: Normal range of motion and neck supple  Right lower leg: No edema  Left lower leg: No edema  Skin:     General: Skin is warm and dry  Neurological:      General: No focal deficit present  Mental Status: He is alert and oriented to person, place, and time  Psychiatric:         Mood and Affect: Mood normal          Behavior: Behavior normal          Discussion/Summary:    Persistent Atrial Fibrillation: He has not had any recent symptomatic afib  Continue with current medical therapy  Last echocardiogram looked good           The patient was counseled regarding diagnostic results, instructions for management, risk factor reductions, impressions  total time of encounter was 25 minutes and 15 minutes was spent counseling

## 2022-10-18 DIAGNOSIS — I48.91 ATRIAL FIBRILLATION, UNSPECIFIED TYPE (HCC): ICD-10-CM

## 2022-10-18 RX ORDER — FLECAINIDE ACETATE 100 MG/1
TABLET ORAL
Qty: 180 TABLET | Refills: 1 | Status: SHIPPED | OUTPATIENT
Start: 2022-10-18

## 2022-11-27 DIAGNOSIS — F33.41 RECURRENT MAJOR DEPRESSIVE DISORDER, IN PARTIAL REMISSION (HCC): ICD-10-CM

## 2022-11-28 ENCOUNTER — OFFICE VISIT (OUTPATIENT)
Dept: CARDIOLOGY CLINIC | Facility: CLINIC | Age: 82
End: 2022-11-28

## 2022-11-28 VITALS
HEART RATE: 64 BPM | BODY MASS INDEX: 21.75 KG/M2 | HEIGHT: 72 IN | DIASTOLIC BLOOD PRESSURE: 64 MMHG | WEIGHT: 160.6 LBS | SYSTOLIC BLOOD PRESSURE: 120 MMHG

## 2022-11-28 DIAGNOSIS — I48.19 PERSISTENT ATRIAL FIBRILLATION (HCC): Primary | ICD-10-CM

## 2022-11-28 RX ORDER — DILTIAZEM HYDROCHLORIDE 240 MG/1
240 CAPSULE, COATED, EXTENDED RELEASE ORAL DAILY
Qty: 90 CAPSULE | Refills: 3 | Status: SHIPPED | OUTPATIENT
Start: 2022-11-28

## 2022-11-28 RX ORDER — FLUOXETINE 10 MG/1
CAPSULE ORAL
Qty: 90 CAPSULE | Refills: 3 | Status: SHIPPED | OUTPATIENT
Start: 2022-11-28

## 2022-11-28 NOTE — PROGRESS NOTES
Cardiology Follow Up    Arturo Barr  1940  [address] [facility] [address] [address] [phone]    1  Persistent atrial fibrillation (HCC)  POCT ECG          Interval History: Followup afib    Doing well  No chest pain, dyspnea or palpitations  Adherent with med rx  Medical Problems     Problem List     Paroxysmal atrial fibrillation (HCC)    Basal cell carcinoma of skin    Benign neoplasm of large intestine    Recurrent major depressive disorder, in partial remission ([facility] )    Degenerative joint disease    Prostate hyperplasia, benign localized, without urinary obstruction    Hyperlipemia, mixed    Melanoma in situ of nose [facility])    Overview Signed 12/2/2021 10:44 AM by Randell May MD     Remote history of melanoma  Had excision  Sees dermatologist yearly for surveillance  Patient believes it was taken off 5 years ago               Past Medical History:   Diagnosis Date   • A-fib [facility])    • Atrial fibrillation ([facility] )    • Basal cell carcinoma     last assessed 10/1/15, resolved 12/15/17   • Benign neoplasm of large intestine     last assessed 11/22/16, resolved 12/15/17    • Closed displaced fracture of shaft of right clavicle     last assessed 10/20/16, resolved 12/15/17    • Diverticulitis of colon     last assessed 3/7/14, resolved 12/15/17    • Dysphagia     last assessed 1/20/14, resolved 11/25/15   • Hyperlipidemia    • Melanoma in situ [facility])     last assessed 2/15/16, resolved 12/15/17      Social History     Socioeconomic History   • Marital status: /Civil Union     Spouse name: Not on file   • Number of children: Not on file   • Years of education: Not on file   • Highest education level: Not on file   Occupational History   • Not on file   Tobacco Use   • Smoking status: Never   • Smokeless tobacco: Never   Substance and Sexual Activity   • Alcohol use: No   • Drug use: No   • Sexual activity: Not on file   Other Topics Concern   • Not on file   Social History Narrative   • Not on file     Social Determinants of Health     Financial Resource Strain: Not on file   Food Insecurity: Not on file   Transportation Needs: Not on file   Physical Activity: Not on file   Stress: Not on file   Social Connections: Not on file   Intimate Partner Violence: Not on file   Housing Stability: Not on file      Family History   Problem Relation Age of Onset   • No Known Problems Mother    • Substance Abuse Neg Hx    • Mental illness Neg Hx      Past Surgical History:   Procedure Laterality Date   • ABSCESS DRAINAGE      Retroperitoneal abscess   • ANASTOMOSIS  12/11/2013    Sigmoid colon   • COLONOSCOPY     • COLOSTOMY  12/11/2013    Temporary   • ILEOSTOMY REVISION  02/11/2014    reversal   • VASECTOMY         Current Outpatient Medications:   •  diltiazem (CARDIZEM CD) 240 mg 24 hr capsule, TAKE 1 CAPSULE BY MOUTH EVERY DAY, Disp: 90 capsule, Rfl: 3  •  Eliquis 5 MG, TAKE 1 TABLET BY MOUTH  TWICE DAILY, Disp: 180 tablet, Rfl: 3  •  flecainide (TAMBOCOR) 100 mg tablet, TAKE 1 TABLET BY MOUTH TWICE A DAY, Disp: 180 tablet, Rfl: 1  •  FLUoxetine (PROzac) 10 mg capsule, TAKE 1 CAPSULE BY MOUTH EVERY DAY, Disp: 90 capsule, Rfl: 3  •  glucosamine-chondroitin 500-400 MG tablet, Take 1 tablet by mouth 2 (two) times a day , Disp: , Rfl:   •  lactobacillus acidophilus, one capsule daily, Disp: , Rfl:   •  multivitamin-iron-minerals-folic acid (CENTRUM) chewable tablet, Chew 1 tablet daily  , Disp: , Rfl:   •  Probiotic Product (PROBIOTIC-10 PO), Take 10 mg by mouth daily, Disp: , Rfl:   •  psyllium (METAMUCIL) 58 6 % powder, Take 1 packet by mouth 3 (three) times a day, Disp: , Rfl:   •  Saw [place] 450 MG CAPS, Take 450 mg by mouth daily, Disp: , Rfl:   No Known Allergies    Labs:     Chemistry        Component Value Date/Time     12/08/2017 0953    K 4 4 11/22/2021 0921     11/22/2021 0921 CO2 26 11/22/2021 0921    BUN 17 11/22/2021 0921    CREATININE 1 13 11/22/2021 0921    CREATININE 1 19 12/08/2017 0953        Component Value Date/Time    CALCIUM 9 7 12/08/2017 0953    ALKPHOS 110 12/08/2017 0953    AST 16 11/22/2021 0921    ALT 11 11/22/2021 0921    BILITOT 0 6 12/08/2017 0953            Lab Results   Component Value Date    CHOL 216 (H) 12/08/2017    CHOL 230 (H) 11/14/2016    CHOL 208 (H) 09/03/2015     Lab Results   Component Value Date    HDL 65 11/22/2021    HDL 61 05/26/2021    HDL 56 06/18/2019     Lab Results   Component Value Date    LDLCALC 129 (H) 11/22/2021    LDLCALC 108 (H) 06/18/2019    LDLCALC 130 (H) 12/08/2017     Lab Results   Component Value Date    TRIG 109 11/22/2021    TRIG 99 05/26/2021    TRIG 132 06/18/2019     Lab Results   Component Value Date    CHOLHDL 3 3 05/26/2021    CHOLHDL 3 4 06/18/2019       Imaging: No results found  EKG: NSR Normal ECG    Review of Systems   Constitutional: Negative  HENT: Negative  Eyes: Negative  Cardiovascular: Negative  Respiratory: Negative  Endocrine: Negative  Hematologic/Lymphatic: Negative  Skin: Negative  Musculoskeletal: Negative  Gastrointestinal: Negative  Genitourinary: Negative  Neurological: Negative  Psychiatric/Behavioral: Negative  Allergic/Immunologic: Negative  Vitals:    11/28/22 1037   BP: 120/64   Pulse: 64           Physical Exam  Vitals reviewed  Constitutional:       Appearance: Normal appearance  HENT:      Head: Normocephalic  Nose: Nose normal       Mouth/Throat:      Mouth: Mucous membranes are moist       Pharynx: Oropharynx is clear  Eyes:      General: No scleral icterus  Conjunctiva/sclera: Conjunctivae normal    Cardiovascular:      Rate and Rhythm: Normal rate and regular rhythm  Heart sounds: No murmur heard  No friction rub  No gallop  Pulmonary:      Effort: Pulmonary effort is normal  No respiratory distress        Breath sounds: Normal breath sounds  No wheezing or rales  Abdominal:      General: Abdomen is flat  Bowel sounds are normal  There is no distension  Palpations: Abdomen is soft  Tenderness: There is no abdominal tenderness  There is no guarding  Musculoskeletal:      Cervical back: Normal range of motion and neck supple  Right lower leg: No edema  Left lower leg: No edema  Skin:     General: Skin is warm and dry  Neurological:      General: No focal deficit present  Mental Status: He is alert and oriented to person, place, and time  Psychiatric:         Mood and Affect: Mood normal          Behavior: Behavior normal          Discussion/Summary:    Persistent Atrial Fibrillation: Doing well  NSR on EKG today  Continue Cardizem, eliquis and Flecainide  The patient was counseled regarding diagnostic results, instructions for management, risk factor reductions, impressions  total time of encounter was 25 minutes and 15 minutes was spent counseling

## 2022-12-02 DIAGNOSIS — I48.19 PERSISTENT ATRIAL FIBRILLATION (HCC): ICD-10-CM

## 2022-12-02 RX ORDER — APIXABAN 5 MG/1
TABLET, FILM COATED ORAL
Qty: 180 TABLET | Refills: 3 | Status: SHIPPED | OUTPATIENT
Start: 2022-12-02

## 2022-12-14 ENCOUNTER — TELEPHONE (OUTPATIENT)
Dept: FAMILY MEDICINE CLINIC | Facility: CLINIC | Age: 82
End: 2022-12-14

## 2022-12-14 DIAGNOSIS — I48.0 PAROXYSMAL ATRIAL FIBRILLATION (HCC): Primary | ICD-10-CM

## 2022-12-14 DIAGNOSIS — E78.2 HYPERLIPEMIA, MIXED: ICD-10-CM

## 2023-01-04 DIAGNOSIS — I48.19 PERSISTENT ATRIAL FIBRILLATION (HCC): ICD-10-CM

## 2023-01-11 LAB
ALBUMIN SERPL-MCNC: 4.6 G/DL (ref 3.6–4.6)
ALBUMIN/GLOB SERPL: 1.9 {RATIO} (ref 1.2–2.2)
ALP SERPL-CCNC: 99 IU/L (ref 44–121)
ALT SERPL-CCNC: 11 IU/L (ref 0–44)
AST SERPL-CCNC: 17 IU/L (ref 0–40)
BILIRUB SERPL-MCNC: 0.6 MG/DL (ref 0–1.2)
BUN SERPL-MCNC: 17 MG/DL (ref 8–27)
BUN/CREAT SERPL: 14 (ref 10–24)
CALCIUM SERPL-MCNC: 9.9 MG/DL (ref 8.6–10.2)
CHLORIDE SERPL-SCNC: 104 MMOL/L (ref 96–106)
CHOLEST SERPL-MCNC: 232 MG/DL (ref 100–199)
CHOLEST/HDLC SERPL: 3.6 RATIO (ref 0–5)
CO2 SERPL-SCNC: 28 MMOL/L (ref 20–29)
CREAT SERPL-MCNC: 1.21 MG/DL (ref 0.76–1.27)
EGFR: 59 ML/MIN/1.73
GLOBULIN SER-MCNC: 2.4 G/DL (ref 1.5–4.5)
GLUCOSE SERPL-MCNC: 90 MG/DL (ref 70–99)
HDLC SERPL-MCNC: 65 MG/DL
LDLC SERPL CALC-MCNC: 144 MG/DL (ref 0–99)
POTASSIUM SERPL-SCNC: 4 MMOL/L (ref 3.5–5.2)
PROT SERPL-MCNC: 7 G/DL (ref 6–8.5)
SL AMB VLDL CHOLESTEROL CALC: 23 MG/DL (ref 5–40)
SODIUM SERPL-SCNC: 143 MMOL/L (ref 134–144)
TRIGL SERPL-MCNC: 132 MG/DL (ref 0–149)

## 2023-01-13 DIAGNOSIS — I48.19 PERSISTENT ATRIAL FIBRILLATION (HCC): ICD-10-CM

## 2023-01-13 RX ORDER — DILTIAZEM HYDROCHLORIDE 240 MG/1
240 CAPSULE, COATED, EXTENDED RELEASE ORAL DAILY
Qty: 90 CAPSULE | Refills: 1 | Status: SHIPPED | OUTPATIENT
Start: 2023-01-13

## 2023-01-16 ENCOUNTER — OFFICE VISIT (OUTPATIENT)
Dept: FAMILY MEDICINE CLINIC | Facility: CLINIC | Age: 83
End: 2023-01-16

## 2023-01-16 VITALS
OXYGEN SATURATION: 98 % | HEIGHT: 70 IN | SYSTOLIC BLOOD PRESSURE: 140 MMHG | TEMPERATURE: 96 F | WEIGHT: 160 LBS | BODY MASS INDEX: 22.9 KG/M2 | RESPIRATION RATE: 18 BRPM | DIASTOLIC BLOOD PRESSURE: 65 MMHG | HEART RATE: 65 BPM

## 2023-01-16 DIAGNOSIS — N40.0 PROSTATE HYPERPLASIA, BENIGN LOCALIZED, WITHOUT URINARY OBSTRUCTION: ICD-10-CM

## 2023-01-16 DIAGNOSIS — F33.41 RECURRENT MAJOR DEPRESSIVE DISORDER, IN PARTIAL REMISSION (HCC): ICD-10-CM

## 2023-01-16 DIAGNOSIS — E78.2 HYPERLIPEMIA, MIXED: ICD-10-CM

## 2023-01-16 DIAGNOSIS — I48.0 PAROXYSMAL ATRIAL FIBRILLATION (HCC): ICD-10-CM

## 2023-01-16 DIAGNOSIS — Z00.00 MEDICARE ANNUAL WELLNESS VISIT, SUBSEQUENT: Primary | ICD-10-CM

## 2023-01-16 RX ORDER — DILTIAZEM HYDROCHLORIDE 240 MG/1
CAPSULE, EXTENDED RELEASE ORAL
COMMUNITY
Start: 2022-11-29 | End: 2023-01-16

## 2023-01-16 NOTE — PROGRESS NOTES
Assessment and Plan:     Problem List Items Addressed This Visit        Cardiovascular and Mediastinum    Paroxysmal atrial fibrillation (HCC)       Genitourinary    Prostate hyperplasia, benign localized, without urinary obstruction       Other    Recurrent major depressive disorder, in partial remission ([facility] [address] )    Hyperlipemia, mixed   Other Visit Diagnoses     Medicare annual wellness visit, subsequent    -  Primary           Preventive health issues were discussed with patient, and age appropriate screening tests were ordered as noted in patient's After Visit Summary  Personalized health advice and appropriate referrals for health education or preventive services given if needed, as noted in patient's After Visit Summary  History of Present Illness:     Patient presents for a Medicare Wellness Visit    Patient for Medicare management and Medicare wellness  Recent labs are reviewed  1) paroxysmal atrial fibrillation-good rate control  Appears to be in sinus rhythm today  Remains on anticoagulation  2) BPH with elevated PSA-this is followed by urology and they are considering a repeat prostate biopsy  3) hyperlipidemia-no current meds  Levels are slightly up  4) depression-stable on current dose of fluoxetine  Patient Care Team:  Jude Dougherty MD as PCP - General  MD Amanuel Moreno MD Aleda Cornet, MD Davis Crew, MD Lynna Artis, MD     Review of Systems:     Review of Systems   Constitutional: Negative for activity change, appetite change, diaphoresis and fatigue  HENT: Negative for congestion, sinus pressure and sore throat  Respiratory: Negative for cough, chest tightness, shortness of breath and wheezing  Cardiovascular: Negative for chest pain, palpitations and leg swelling  Fast or slow heart rate   Gastrointestinal: Negative for abdominal pain, blood in stool, constipation, diarrhea, nausea and vomiting     Genitourinary: Negative for difficulty urinating, dysuria, frequency and hematuria  Musculoskeletal: Negative for arthralgias, gait problem, joint swelling and myalgias  Neurological: Negative for dizziness, light-headedness and headaches  Psychiatric/Behavioral: Negative for agitation, confusion, dysphoric mood and sleep disturbance  The patient is not nervous/anxious           Problem List:     Patient Active Problem List   Diagnosis   • Paroxysmal atrial fibrillation (HCC)   • Basal cell carcinoma of skin   • Benign neoplasm of large intestine   • Recurrent major depressive disorder, in partial remission ([facility] [address] )   • Degenerative joint disease   • Prostate hyperplasia, benign localized, without urinary obstruction   • Hyperlipemia, mixed   • Melanoma in situ of nose [facility])      Past Medical and Surgical History:     Past Medical History:   Diagnosis Date   • A-fib [facility])    • Atrial fibrillation ([facility] )    • Basal cell carcinoma     last assessed 10/1/15, resolved 12/15/17   • Benign neoplasm of large intestine     last assessed 11/22/16, resolved 12/15/17    • Closed displaced fracture of shaft of right clavicle     last assessed 10/20/16, resolved 12/15/17    • Diverticulitis of colon     last assessed 3/7/14, resolved 12/15/17    • Dysphagia     last assessed 1/20/14, resolved 11/25/15   • Hyperlipidemia    • Melanoma in situ ([place] [id number] )     last assessed 2/15/16, resolved 12/15/17      Past Surgical History:   Procedure Laterality Date   • ABSCESS DRAINAGE      Retroperitoneal abscess   • ANASTOMOSIS  12/11/2013    Sigmoid colon   • COLONOSCOPY     • COLOSTOMY  12/11/2013    Temporary   • ILEOSTOMY REVISION  02/11/2014    reversal   • VASECTOMY        Family History:     Family History   Problem Relation Age of Onset   • No Known Problems Mother    • Substance Abuse Neg Hx    • Mental illness Neg Hx       Social History:     Social History     Socioeconomic History   • Marital status: /Civil Union     Spouse name: None   • Number of children: None • Years of education: None   • Highest education level: None   Occupational History   • None   Tobacco Use   • Smoking status: Never   • Smokeless tobacco: Never   Substance and Sexual Activity   • Alcohol use: No   • Drug use: No   • Sexual activity: None   Other Topics Concern   • None   Social History Narrative   • None     Social Determinants of Health     Financial Resource Strain: Low Risk    • Difficulty of Paying Living Expenses: Not hard at all   Food Insecurity: Not on file   Transportation Needs: No Transportation Needs   • Lack of Transportation (Medical): No   • Lack of Transportation (Non-Medical): No   Physical Activity: Not on file   Stress: Not on file   Social Connections: Not on file   Intimate Partner Violence: Not on file   Housing Stability: Not on file      Medications and Allergies:     Current Outpatient Medications   Medication Sig Dispense Refill   • apixaban (Eliquis) 5 mg Take 1 tablet (5 mg total) by mouth 2 (two) times a day 180 tablet 3   • diltiazem (CARDIZEM CD) 240 mg 24 hr capsule Take 1 capsule (240 mg total) by mouth daily 90 capsule 1   • flecainide (TAMBOCOR) 100 mg tablet TAKE 1 TABLET BY MOUTH TWICE A  tablet 1   • FLUoxetine (PROzac) 10 mg capsule TAKE 1 CAPSULE BY MOUTH EVERY DAY 90 capsule 3   • glucosamine-chondroitin 500-400 MG tablet Take 1 tablet by mouth 2 (two) times a day  • lactobacillus acidophilus one capsule daily     • multivitamin-iron-minerals-folic acid (CENTRUM) chewable tablet Chew 1 tablet daily  • Probiotic Product (PROBIOTIC-10 PO) Take 10 mg by mouth daily     • psyllium (METAMUCIL) 58 6 % powder Take 1 packet by mouth 3 (three) times a day     • Saw [place] 450 MG CAPS Take 450 mg by mouth daily       No current facility-administered medications for this visit       No Known Allergies   Immunizations:     Immunization History   Administered Date(s) Administered   • COVID-19 MODERNA VACC 0 5 ML IM 01/27/2021, 02/23/2021, 10/22/2021, 04/11/2022   • COVID-19 Pfizer Vac BIVALENT Gwyn-sucrose 12 Yr+ IM (BOOSTER ONLY) 09/14/2022   • INFLUENZA 10/19/2017, 10/01/2018, 09/15/2021   • Influenza Split High Dose Preservative Free IM 10/01/2015, 10/14/2016, 10/07/2019   • Influenza, high dose seasonal 0 7 mL 10/01/2018   • Influenza, seasonal, injectable 09/18/2014, 10/01/2017   • Pneumococcal Conjugate 13-Valent 04/01/2016   • Pneumococcal Polysaccharide PPV23 10/30/2006, 06/24/2019   • Td (adult), adsorbed 07/18/2005      Health Maintenance: There are no preventive care reminders to display for this patient  Topic Date Due   • COVID-19 Vaccine (5 - Booster for Moderna series) 06/06/2022   • Influenza Vaccine (1) 09/01/2022      Medicare Screening Tests and Risk Assessments:     Alberto Maldonado is here for his Subsequent Wellness visit  Last Medicare Wellness visit information reviewed, patient interviewed and updates made to the record today  Health Risk Assessment:   Patient rates overall health as good  Patient feels that their physical health rating is same  Patient is satisfied with their life  Eyesight was rated as same  Hearing was rated as same  Patient feels that their emotional and mental health rating is same  Patients states they are never, rarely angry  Patient states they are never, rarely unusually tired/fatigued  Pain experienced in the last 7 days has been none  Patient states that he has experienced no weight loss or gain in last 6 months  Fall Risk Screening: In the past year, patient has experienced: history of falling in past year    Number of falls: 1  Injured during fall?: No    Feels unsteady when standing or walking?: Yes    Worried about falling?: Yes      Home Safety:  Patient does not have trouble with stairs inside or outside of their home  Patient has working smoke alarms and has working carbon monoxide detector  Home safety hazards include: none  Nutrition:   Current diet is Regular       Medications:   Patient is currently taking over-the-counter supplements  OTC medications include: see medication list  Patient is able to manage medications  Activities of Daily Living (ADLs)/Instrumental Activities of Daily Living (IADLs):   Walk and transfer into and out of bed and chair?: Yes  Dress and groom yourself?: Yes    Bathe or shower yourself?: Yes    Feed yourself? Yes  Do your laundry/housekeeping?: Yes  Manage your money, pay your bills and track your expenses?: Yes  Make your own meals?: Yes    Do your own shopping?: Yes    Previous Hospitalizations:   Any hospitalizations or ED visits within the last 12 months?: No      Advance Care Planning:   Living will: No    Durable POA for healthcare: No    Advanced directive: No    Five wishes given: Yes      Cognitive Screening:   Provider or family/friend/caregiver concerned regarding cognition?: No    PREVENTIVE SCREENINGS      Cardiovascular Screening:    General: Screening Not Indicated and History Lipid Disorder      Diabetes Screening:     General: Screening Current      Prostate Cancer Screening:    General: Screening Not Indicated      Osteoporosis Screening:    General: Screening Not Indicated      Abdominal Aortic Aneurysm (AAA) Screening:        General: Screening Not Indicated      Lung Cancer Screening:     General: Screening Not Indicated      Hepatitis C Screening:    General: Risks and Benefits Discussed    Screening, Brief Intervention, and Referral to Treatment (SBIRT)    Screening  Typical number of drinks in a day: 0  Typical number of drinks in a week: 0  Interpretation: Low risk drinking behavior  Single Item Drug Screening:  How often have you used an illegal drug (including marijuana) or a prescription medication for non-medical reasons in the past year? never    Single Item Drug Screen Score: 0  Interpretation: Negative screen for possible drug use disorder    Brief Intervention  Alcohol & drug use screenings were reviewed   No concerns regarding substance use disorder identified  Other Counseling Topics:   Car/seat belt/driving safety and regular weightbearing exercise  No results found  Physical Exam:     /65 (BP Location: Left arm, Patient Position: Sitting, Cuff Size: Adult) Comment: per machine  Pulse 65   Temp (!) 96 °F (35 6 °C) (Tympanic)   Resp 18   Ht 5' 10" (1 778 m)   Wt 72 6 kg (160 lb)   SpO2 98%   BMI 22 96 kg/m²     Physical Exam  Vitals and nursing note reviewed  Constitutional:       General: He is not in acute distress  Appearance: He is not ill-appearing  HENT:      Head: Normocephalic and atraumatic  Right Ear: Tympanic membrane, ear canal and external ear normal       Left Ear: Tympanic membrane, ear canal and external ear normal       Nose: Nose normal       Mouth/Throat:      Pharynx: No posterior oropharyngeal erythema  Eyes:      General:         Right eye: No discharge  Left eye: No discharge  Extraocular Movements: Extraocular movements intact  Conjunctiva/sclera: Conjunctivae normal       Pupils: Pupils are equal, round, and reactive to light  Neck:      Thyroid: No thyromegaly  Vascular: No carotid bruit  Trachea: No tracheal deviation  Cardiovascular:      Rate and Rhythm: Normal rate and regular rhythm  Heart sounds: Normal heart sounds  No murmur heard  Pulmonary:      Effort: Pulmonary effort is normal  No respiratory distress  Breath sounds: Normal breath sounds  No wheezing  Abdominal:      General: Bowel sounds are normal  There is no distension  Palpations: Abdomen is soft  There is no mass  Tenderness: There is no abdominal tenderness  There is no guarding  Musculoskeletal:      Cervical back: Normal range of motion and neck supple  Right lower leg: No edema  Left lower leg: No edema  Lymphadenopathy:      Cervical: No cervical adenopathy  Skin:     Findings: No rash     Neurological:      General: No focal deficit present  Mental Status: He is alert and oriented to person, place, and time  Cranial Nerves: No cranial nerve deficit        Deep Tendon Reflexes: Reflexes normal    Psychiatric:         Mood and Affect: Mood normal          Behavior: Behavior normal           Norm Velez MD

## 2023-01-16 NOTE — PATIENT INSTRUCTIONS
Medical management-continue current medications  Advise getting Adacel/whooping cough booster at pharmacy due to  great grandchild expected  Follow-up with cardiology  Follow-up with urology  Medicare Preventive Visit Patient Instructions  Thank you for completing your Welcome to Medicare Visit or Medicare Annual Wellness Visit today  Your next wellness visit will be due in one year (2024)  The screening/preventive services that you may require over the next 5-10 years are detailed below  Some tests may not apply to you based off risk factors and/or age  Screening tests ordered at today's visit but not completed yet may show as past due  Also, please note that scanned in results may not display below  Preventive Screenings:  Service Recommendations Previous Testing/Comments   Colorectal Cancer Screening  Colonoscopy    Fecal Occult Blood Test (FOBT)/Fecal Immunochemical Test (FIT)  Fecal DNA/Cologuard Test  Flexible Sigmoidoscopy Age: 39-70 years old   Colonoscopy: every 10 years (May be performed more frequently if at higher risk)  OR  FOBT/FIT: every 1 year  OR  Cologuard: every 3 years  OR  Sigmoidoscopy: every 5 years  Screening may be recommended earlier than age 39 if at higher risk for colorectal cancer  Also, an individualized decision between you and your healthcare provider will decide whether screening between the ages of 74-80 would be appropriate   Colonoscopy: 10/03/2013  FOBT/FIT: Not on file  Cologuard: Not on file  Sigmoidoscopy: Not on file          Prostate Cancer Screening Individualized decision between patient and health care provider in men between ages of 53-78   Medicare will cover every 12 months beginning on the day after your 50th birthday PSA: No results in last 5 years           Hepatitis C Screening Once for adults born between [facility]  More frequently in patients at high risk for Hepatitis C Hep C Antibody: Not on file        Diabetes Screening 1-2 times per year if you're at risk for diabetes or have pre-diabetes Fasting glucose: No results in last 5 years (No results in last 5 years)  A1C: No results in last 5 years (No results in last 5 years)      Cholesterol Screening Once every 5 years if you don't have a lipid disorder  May order more often based on risk factors  Lipid panel: 01/10/2023         Other Preventive Screenings Covered by Medicare:  Abdominal Aortic Aneurysm (AAA) Screening: covered once if your at risk  You're considered to be at risk if you have a family history of AAA or a male between the age of 73-68 who smoking at least 100 cigarettes in your lifetime  Lung Cancer Screening: covers low dose CT scan once per year if you meet all of the following conditions: (1) Age 50-69; (2) No signs or symptoms of lung cancer; (3) Current smoker or have quit smoking within the last 15 years; (4) You have a tobacco smoking history of at least 20 pack years (packs per day x number of years you smoked); (5) You get a written order from a healthcare provider  Glaucoma Screening: covered annually if you're considered high risk: (1) You have diabetes OR (2) Family history of glaucoma OR (3)  aged 48 and older OR (3)  American aged 72 and older  Osteoporosis Screening: covered every 2 years if you meet one of the following conditions: (1) Have a vertebral abnormality; (2) On glucocorticoid therapy for more than 3 months; (3) Have primary hyperparathyroidism; (4) On osteoporosis medications and need to assess response to drug therapy  HIV Screening: covered annually if you're between the age of 12-76  Also covered annually if you are younger than 13 and older than 72 with risk factors for HIV infection  For pregnant patients, it is covered up to 3 times per pregnancy      Immunizations:  Immunization Recommendations   Influenza Vaccine Annual influenza vaccination during flu season is recommended for all persons aged >= 6 months who do not have contraindications   Pneumococcal Vaccine   * Pneumococcal conjugate vaccine = PCV13 (Prevnar 13), PCV15 (Vaxneuvance), PCV20 (Prevnar 20)  * Pneumococcal polysaccharide vaccine = PPSV23 (Pneumovax) Adults 2364 years old: 1-3 doses may be recommended based on certain risk factors  Adults 72 years old: 1-2 doses may be recommended based off what pneumonia vaccine you previously received   Hepatitis B Vaccine 3 dose series if at intermediate or high risk (ex: diabetes, end stage renal disease, liver disease)   Tetanus (Td) Vaccine - COST NOT COVERED BY MEDICARE PART B Following completion of primary series, a booster dose should be given every 10 years to maintain immunity against tetanus  Td may also be given as tetanus wound prophylaxis  Tdap Vaccine - COST NOT COVERED BY MEDICARE PART B Recommended at least once for all adults  For pregnant patients, recommended with each pregnancy  Shingles Vaccine (Shingrix) - COST NOT COVERED BY MEDICARE PART B  2 shot series recommended in those aged 48 and above     Health Maintenance Due:  There are no preventive care reminders to display for this patient  Immunizations Due:      Topic Date Due    COVID-19 Vaccine (4 - Booster for Moderna series) 12/17/2021    Influenza Vaccine (1) 09/01/2022     Advance Directives   What are advance directives? Advance directives are legal documents that state your wishes and plans for medical care  These plans are made ahead of time in case you lose your ability to make decisions for yourself  Advance directives can apply to any medical decision, such as the treatments you want, and if you want to donate organs  What are the types of advance directives? There are many types of advance directives, and each state has rules about how to use them  You may choose a combination of any of the following:  Living will: This is a written record of the treatment you want   You can also choose which treatments you do not want, which to limit, and which to stop at a certain time  This includes surgery, medicine, IV fluid, and tube feedings  Durable power of  for healthcare [facility]): This is a written record that states who you want to make healthcare choices for you when you are unable to make them for yourself  This person, called a proxy, is usually a family member or a friend  You may choose more than 1 proxy  Do not resuscitate (DNR) order:  A DNR order is used in case your heart stops beating or you stop breathing  It is a request not to have certain forms of treatment, such as CPR  A DNR order may be included in other types of advance directives  Medical directive: This covers the care that you want if you are in a coma, near death, or unable to make decisions for yourself  You can list the treatments you want for each condition  Treatment may include pain medicine, surgery, blood transfusions, dialysis, IV or tube feedings, and a ventilator (breathing machine)  Values history: This document has questions about your views, beliefs, and how you feel and think about life  This information can help others choose the care that you would choose  Why are advance directives important? An advance directive helps you control your care  Although spoken wishes may be used, it is better to have your wishes written down  Spoken wishes can be misunderstood, or not followed  Treatments may be given even if you do not want them  An advance directive may make it easier for your family to make difficult choices about your care  © Copyright [employer] 2018 Information is for End User's use only and may not be sold, redistributed or otherwise used for commercial purposes   All illustrations and images included in CareNotes® are the copyrighted property of A D A M , [employer]  or [facility] [employer]

## 2023-05-31 ENCOUNTER — OFFICE VISIT (OUTPATIENT)
Dept: CARDIOLOGY CLINIC | Facility: CLINIC | Age: 83
End: 2023-05-31

## 2023-05-31 VITALS
SYSTOLIC BLOOD PRESSURE: 140 MMHG | WEIGHT: 156.2 LBS | HEART RATE: 61 BPM | DIASTOLIC BLOOD PRESSURE: 58 MMHG | HEIGHT: 70 IN | BODY MASS INDEX: 22.36 KG/M2

## 2023-05-31 DIAGNOSIS — I48.19 PERSISTENT ATRIAL FIBRILLATION (HCC): Primary | ICD-10-CM

## 2023-05-31 NOTE — PROGRESS NOTES
Cardiology Follow Up    Karen Henao  1940  [address] [facility] [address] [address] [phone]    1  Persistent atrial fibrillation (HCC)  POCT ECG          Interval History: Followup for afib    Doing well  No chest pain, dyspnea or palpitations  Medical Problems     Problem List     Paroxysmal atrial fibrillation (HCC)    Basal cell carcinoma of skin    Benign neoplasm of large intestine    Recurrent major depressive disorder, in partial remission ([facility] [address] )    Degenerative joint disease    Prostate hyperplasia, benign localized, without urinary obstruction    Hyperlipemia, mixed    Melanoma in situ of nose [facility])    Overview Signed 12/2/2021 10:44 AM by Graciela Castrejon MD     Remote history of melanoma  Had excision  Sees dermatologist yearly for surveillance  Patient believes it was taken off 5 years ago               Past Medical History:   Diagnosis Date   • A-fib [facility])    • Atrial fibrillation ([facility] [address] )    • Basal cell carcinoma     last assessed 10/1/15, resolved 12/15/17   • Benign neoplasm of large intestine     last assessed 11/22/16, resolved 12/15/17    • Closed displaced fracture of shaft of right clavicle     last assessed 10/20/16, resolved 12/15/17    • Diverticulitis of colon     last assessed 3/7/14, resolved 12/15/17    • Dysphagia     last assessed 1/20/14, resolved 11/25/15   • Hyperlipidemia    • Melanoma in situ [facility])     last assessed 2/15/16, resolved 12/15/17      Social History     Socioeconomic History   • Marital status: /Civil Union     Spouse name: Not on file   • Number of children: Not on file   • Years of education: Not on file   • Highest education level: Not on file   Occupational History   • Not on file   Tobacco Use   • Smoking status: Never   • Smokeless tobacco: Never   Substance and Sexual Activity   • Alcohol use: No   • Drug use: No   • Sexual activity: Not on file   Other Topics Concern   • Not on file   Social History Narrative   • Not on file     Social Determinants of Health     Financial Resource Strain: Low Risk  (1/16/2023)    Overall Financial Resource Strain (CARDIA)    • Difficulty of Paying Living Expenses: Not hard at all   Food Insecurity: Not on file   Transportation Needs: No Transportation Needs (1/16/2023)    PRAPARE - Transportation    • Lack of Transportation (Medical): No    • Lack of Transportation (Non-Medical): No   Physical Activity: Not on file   Stress: Not on file   Social Connections: Not on file   Intimate Partner Violence: Not on file   Housing Stability: Not on file      Family History   Problem Relation Age of Onset   • No Known Problems Mother    • Substance Abuse Neg Hx    • Mental illness Neg Hx      Past Surgical History:   Procedure Laterality Date   • ABSCESS DRAINAGE      Retroperitoneal abscess   • ANASTOMOSIS  12/11/2013    Sigmoid colon   • COLONOSCOPY     • COLOSTOMY  12/11/2013    Temporary   • ILEOSTOMY REVISION  02/11/2014    reversal   • VASECTOMY         Current Outpatient Medications:   •  apixaban (Eliquis) 5 mg, Take 1 tablet (5 mg total) by mouth 2 (two) times a day, Disp: 180 tablet, Rfl: 3  •  diltiazem (CARDIZEM CD) 240 mg 24 hr capsule, Take 1 capsule (240 mg total) by mouth daily, Disp: 90 capsule, Rfl: 1  •  flecainide (TAMBOCOR) 100 mg tablet, TAKE 1 TABLET BY MOUTH TWICE A DAY, Disp: 180 tablet, Rfl: 1  •  FLUoxetine (PROzac) 10 mg capsule, TAKE 1 CAPSULE BY MOUTH EVERY DAY, Disp: 90 capsule, Rfl: 3  •  glucosamine-chondroitin 500-400 MG tablet, Take 1 tablet by mouth 2 (two) times a day , Disp: , Rfl:   •  lactobacillus acidophilus, one capsule daily, Disp: , Rfl:   •  multivitamin-iron-minerals-folic acid (CENTRUM) chewable tablet, Chew 1 tablet daily  , Disp: , Rfl:   •  Probiotic Product (PROBIOTIC-10 PO), Take 10 mg by mouth daily, Disp: , Rfl:   •  psyllium (METAMUCIL) 58 6 % powder, Take 1 packet by mouth 3 (three) times a day, Disp: , Rfl:   •  Saw [place] 450 MG CAPS, Take 450 mg by mouth daily, Disp: , Rfl:   No Known Allergies    Labs:     Chemistry        Component Value Date/Time    BUN 17 01/10/2023 0936     01/10/2023 0936    CO2 28 01/10/2023 0936    CREATININE 1 21 01/10/2023 0936    CREATININE 1 19 12/08/2017 0953    K 4 0 01/10/2023 0936     12/08/2017 0953        Component Value Date/Time    ALKPHOS 110 12/08/2017 0953    ALT 11 01/10/2023 0936    AST 17 01/10/2023 0936    BILITOT 0 6 12/08/2017 0953    CALCIUM 9 7 12/08/2017 0953            Lab Results   Component Value Date    CHOL 216 (H) 12/08/2017    CHOL 230 (H) 11/14/2016    CHOL 208 (H) 09/03/2015     Lab Results   Component Value Date    HDL 65 01/10/2023    HDL 65 11/22/2021    HDL 61 05/26/2021     Lab Results   Component Value Date    LDLCALC 144 (H) 01/10/2023    LDLCALC 129 (H) 11/22/2021    LDLCALC 108 (H) 06/18/2019     Lab Results   Component Value Date    TRIG 132 01/10/2023    TRIG 109 11/22/2021    TRIG 99 05/26/2021     Lab Results   Component Value Date    CHOLHDL 3 6 01/10/2023    CHOLHDL 3 3 05/26/2021    CHOLHDL 3 4 06/18/2019       Imaging: No results found  EKG: NSR with 1st degree AVB    Review of Systems   Constitutional: Negative  HENT: Negative  Eyes: Negative  Cardiovascular: Negative  Respiratory: Negative  Endocrine: Negative  Hematologic/Lymphatic: Negative  Skin: Negative  Musculoskeletal: Negative  Gastrointestinal: Negative  Genitourinary: Negative  Neurological: Negative  Psychiatric/Behavioral: Negative  Allergic/Immunologic: Negative  Vitals:    05/31/23 0938   BP: 140/58   Pulse: 61           Physical Exam  Vitals reviewed  Constitutional:       Appearance: Normal appearance  HENT:      Head: Normocephalic  Nose: Nose normal       Mouth/Throat:      Mouth: Mucous membranes are moist       Pharynx: Oropharynx is clear     Eyes: General: No scleral icterus  Conjunctiva/sclera: Conjunctivae normal    Cardiovascular:      Rate and Rhythm: Normal rate and regular rhythm  Heart sounds: No murmur heard  No friction rub  No gallop  Pulmonary:      Effort: Pulmonary effort is normal  No respiratory distress  Breath sounds: Normal breath sounds  No wheezing or rales  Abdominal:      General: Abdomen is flat  Bowel sounds are normal  There is no distension  Palpations: Abdomen is soft  Tenderness: There is no abdominal tenderness  There is no guarding  Musculoskeletal:      Cervical back: Normal range of motion and neck supple  Right lower leg: No edema  Left lower leg: No edema  Skin:     General: Skin is warm and dry  Neurological:      General: No focal deficit present  Mental Status: He is alert and oriented to person, place, and time  Psychiatric:         Mood and Affect: Mood normal          Behavior: Behavior normal          Discussion/Summary:    Persistent Atrial Fibrillation: He is in NSR  No recent symptomatic afib  Continue with diltiazem, flecainide and eliquis  The patient was counseled regarding diagnostic results, instructions for management, risk factor reductions, impressions  total time of encounter was 25 minutes and 15 minutes was spent counseling

## 2023-07-14 DIAGNOSIS — I48.19 PERSISTENT ATRIAL FIBRILLATION (HCC): ICD-10-CM

## 2023-07-14 RX ORDER — DILTIAZEM HYDROCHLORIDE 240 MG/1
240 CAPSULE, COATED, EXTENDED RELEASE ORAL DAILY
Qty: 90 CAPSULE | Refills: 1 | Status: SHIPPED | OUTPATIENT
Start: 2023-07-14

## 2023-10-12 DIAGNOSIS — I48.91 ATRIAL FIBRILLATION, UNSPECIFIED TYPE (HCC): ICD-10-CM

## 2023-10-16 ENCOUNTER — TELEPHONE (OUTPATIENT)
Dept: CARDIOLOGY CLINIC | Facility: CLINIC | Age: 83
End: 2023-10-16

## 2023-10-16 RX ORDER — FLECAINIDE ACETATE 100 MG/1
TABLET ORAL
Qty: 180 TABLET | Refills: 1 | Status: SHIPPED | OUTPATIENT
Start: 2023-10-16

## 2023-11-17 DIAGNOSIS — I48.19 PERSISTENT ATRIAL FIBRILLATION (HCC): ICD-10-CM

## 2023-11-17 RX ORDER — APIXABAN 5 MG/1
5 TABLET, FILM COATED ORAL 2 TIMES DAILY
Qty: 180 TABLET | Refills: 3 | Status: SHIPPED | OUTPATIENT
Start: 2023-11-17

## 2023-11-24 DIAGNOSIS — F33.41 RECURRENT MAJOR DEPRESSIVE DISORDER, IN PARTIAL REMISSION (HCC): ICD-10-CM

## 2023-11-24 RX ORDER — FLUOXETINE 10 MG/1
10 CAPSULE ORAL DAILY
Qty: 90 CAPSULE | Refills: 3 | Status: SHIPPED | OUTPATIENT
Start: 2023-11-24

## 2023-12-07 ENCOUNTER — OFFICE VISIT (OUTPATIENT)
Dept: CARDIOLOGY CLINIC | Facility: CLINIC | Age: 83
End: 2023-12-07
Payer: COMMERCIAL

## 2023-12-07 VITALS
BODY MASS INDEX: 22.48 KG/M2 | DIASTOLIC BLOOD PRESSURE: 68 MMHG | HEART RATE: 60 BPM | HEIGHT: 70 IN | SYSTOLIC BLOOD PRESSURE: 138 MMHG | WEIGHT: 157 LBS

## 2023-12-07 DIAGNOSIS — N18.31 STAGE 3A CHRONIC KIDNEY DISEASE (HCC): ICD-10-CM

## 2023-12-07 DIAGNOSIS — N18.31 CHRONIC KIDNEY DISEASE, STAGE 3A (HCC): ICD-10-CM

## 2023-12-07 DIAGNOSIS — I48.19 PERSISTENT ATRIAL FIBRILLATION (HCC): Primary | ICD-10-CM

## 2023-12-07 PROCEDURE — 93000 ELECTROCARDIOGRAM COMPLETE: CPT | Performed by: INTERNAL MEDICINE

## 2023-12-07 PROCEDURE — 99214 OFFICE O/P EST MOD 30 MIN: CPT | Performed by: INTERNAL MEDICINE

## 2023-12-07 NOTE — PROGRESS NOTES
Cardiology Follow Up    Nita Riedel  1940  [facility] Mora Ramsey  [address] [place] [id number] [phone]    1. Persistent atrial fibrillation (HCC)  POCT ECG          Interval History: Followup for afib    Doing well. No chest pain, dyspnea or palpitations. Medical Problems       Problem List       Paroxysmal atrial fibrillation (HCC)    Basal cell carcinoma of skin    Benign neoplasm of large intestine    Recurrent major depressive disorder, in partial remission ([address])    Degenerative joint disease    Prostate hyperplasia, benign localized, without urinary obstruction    Hyperlipemia, mixed    Melanoma in situ of nose [facility])    Overview Signed 12/2/2021 10:44 AM by David Oleary MD     Remote history of melanoma. Had excision. Sees dermatologist yearly for surveillance. Patient believes it was taken off 5 years ago.              Past Medical History:   Diagnosis Date    A-fib [facility])     Atrial fibrillation ([address])     Basal cell carcinoma     last assessed 10/1/15, resolved 12/15/17    Benign neoplasm of large intestine     last assessed 11/22/16, resolved 12/15/17     Closed displaced fracture of shaft of right clavicle     last assessed 10/20/16, resolved 12/15/17     Diverticulitis of colon     last assessed 3/7/14, resolved 12/15/17     Dysphagia     last assessed 1/20/14, resolved 11/25/15    Hyperlipidemia     Melanoma in situ [facility])     last assessed 2/15/16, resolved 12/15/17      Social History     Socioeconomic History    Marital status: /Civil Union     Spouse name: Not on file    Number of children: Not on file    Years of education: Not on file    Highest education level: Not on file   Occupational History    Not on file   Tobacco Use    Smoking status: Never    Smokeless tobacco: Never   Substance and Sexual Activity    Alcohol use: No    Drug use: No    Sexual activity: Not on file   Other Topics Concern    Not on file   Social History Narrative    Not on file     Social Determinants of Health     Financial Resource Strain: Low Risk  (1/16/2023)    Overall Financial Resource Strain (CARDIA)     Difficulty of Paying Living Expenses: Not hard at all   Food Insecurity: Not on file   Transportation Needs: No Transportation Needs (1/16/2023)    PRAPARE - Transportation     Lack of Transportation (Medical): No     Lack of Transportation (Non-Medical): No   Physical Activity: Not on file   Stress: Not on file   Social Connections: Not on file   Intimate Partner Violence: Not on file   Housing Stability: Not on file      Family History   Problem Relation Age of Onset    No Known Problems Mother     Substance Abuse Neg Hx     Mental illness Neg Hx      Past Surgical History:   Procedure Laterality Date    ABSCESS DRAINAGE      Retroperitoneal abscess    ANASTOMOSIS  12/11/2013    Sigmoid colon    COLONOSCOPY      COLOSTOMY  12/11/2013    Temporary    ILEOSTOMY REVISION  02/11/2014    reversal    VASECTOMY         Current Outpatient Medications:     diltiazem (CARDIZEM CD) 240 mg 24 hr capsule, Take 1 capsule (240 mg total) by mouth daily, Disp: 90 capsule, Rfl: 1    Eliquis 5 MG, TAKE 1 TABLET BY MOUTH TWICE  DAILY, Disp: 180 tablet, Rfl: 3    flecainide (TAMBOCOR) 100 mg tablet, TAKE 1 TABLET BY MOUTH TWICE A DAY, Disp: 180 tablet, Rfl: 1    FLUoxetine (PROzac) 10 mg capsule, Take 1 capsule (10 mg total) by mouth daily, Disp: 90 capsule, Rfl: 3    glucosamine-chondroitin 500-400 MG tablet, Take 1 tablet by mouth 2 (two) times a day., Disp: , Rfl:     lactobacillus acidophilus, one capsule daily, Disp: , Rfl:     multivitamin-iron-minerals-folic acid (CENTRUM) chewable tablet, Chew 1 tablet daily. , Disp: , Rfl:     Probiotic Product (PROBIOTIC-10 PO), Take 10 mg by mouth daily, Disp: , Rfl:     psyllium (METAMUCIL) 58.6 % powder, Take 1 packet by mouth 3 (three) times a day, Disp: , Rfl:     Saw [place] 450 MG CAPS, Take 450 mg by mouth daily, Disp: , Rfl:   No Known Allergies    Labs:     Chemistry        Component Value Date/Time     12/08/2017 0953    K 4.0 01/10/2023 0936     01/10/2023 0936    CO2 28 01/10/2023 0936    BUN 17 01/10/2023 0936    CREATININE 1.21 01/10/2023 0936    CREATININE 1.19 12/08/2017 0953        Component Value Date/Time    CALCIUM 9.7 12/08/2017 0953    ALKPHOS 110 12/08/2017 0953    AST 17 01/10/2023 0936    ALT 11 01/10/2023 0936    BILITOT 0.6 12/08/2017 0953            Lab Results   Component Value Date    CHOL 216 (H) 12/08/2017    CHOL 230 (H) 11/14/2016    CHOL 208 (H) 09/03/2015     Lab Results   Component Value Date    HDL 65 01/10/2023    HDL 65 11/22/2021    HDL 61 05/26/2021     Lab Results   Component Value Date    LDLCALC 144 (H) 01/10/2023    LDLCALC 129 (H) 11/22/2021    LDLCALC 108 (H) 06/18/2019     Lab Results   Component Value Date    TRIG 132 01/10/2023    TRIG 109 11/22/2021    TRIG 99 05/26/2021     Lab Results   Component Value Date    CHOLHDL 3.6 01/10/2023    CHOLHDL 3.3 05/26/2021    CHOLHDL 3.4 06/18/2019       Imaging: No results found. EKG: NSr LAFB. Review of Systems   Constitutional: Negative. HENT: Negative. Eyes: Negative. Cardiovascular: Negative. Respiratory: Negative. Endocrine: Negative. Hematologic/Lymphatic: Negative. Skin: Negative. Musculoskeletal: Negative. Gastrointestinal: Negative. Genitourinary: Negative. Neurological: Negative. Psychiatric/Behavioral: Negative. Allergic/Immunologic: Negative. Vitals:    12/07/23 0927   BP: 138/68   Pulse: 60           Physical Exam  Vitals reviewed. Constitutional:       Appearance: Normal appearance. HENT:      Head: Normocephalic. Nose: Nose normal.      Mouth/Throat:      Mouth: Mucous membranes are moist.      Pharynx: Oropharynx is clear. Eyes:      General: No scleral icterus.      Conjunctiva/sclera: Conjunctivae normal.   Cardiovascular:      Rate and Rhythm: Normal rate and regular rhythm. Heart sounds: No murmur heard. No friction rub. No gallop. Pulmonary:      Effort: Pulmonary effort is normal. No respiratory distress. Breath sounds: Normal breath sounds. No wheezing or rales. Abdominal:      General: Abdomen is flat. Bowel sounds are normal. There is no distension. Palpations: Abdomen is soft. Tenderness: There is no abdominal tenderness. There is no guarding. Musculoskeletal:      Cervical back: Normal range of motion and neck supple. Right lower leg: No edema. Left lower leg: No edema. Skin:     General: Skin is warm and dry. Neurological:      General: No focal deficit present. Mental Status: He is alert and oriented to person, place, and time. Psychiatric:         Mood and Affect: Mood normal.         Behavior: Behavior normal.         Discussion/Summary:    Persistent Atrial Fibrillation: Doing well. NSR today. No changes. The patient was counseled regarding diagnostic results, instructions for management, risk factor reductions, impressions. total time of encounter was 25 minutes and 15 minutes was spent counseling.

## 2023-12-28 DIAGNOSIS — E78.2 HYPERLIPEMIA, MIXED: Primary | ICD-10-CM

## 2024-01-14 DIAGNOSIS — I48.19 PERSISTENT ATRIAL FIBRILLATION (HCC): ICD-10-CM

## 2024-01-15 DIAGNOSIS — I48.19 PERSISTENT ATRIAL FIBRILLATION (HCC): ICD-10-CM

## 2024-01-15 RX ORDER — DILTIAZEM HYDROCHLORIDE 240 MG/1
240 CAPSULE, COATED, EXTENDED RELEASE ORAL DAILY
Qty: 90 CAPSULE | Refills: 1 | Status: CANCELLED | OUTPATIENT
Start: 2024-01-15

## 2024-01-15 RX ORDER — DILTIAZEM HYDROCHLORIDE 240 MG/1
240 CAPSULE, COATED, EXTENDED RELEASE ORAL DAILY
Qty: 90 CAPSULE | Refills: 1 | Status: SHIPPED | OUTPATIENT
Start: 2024-01-15

## 2024-01-18 DIAGNOSIS — E78.2 HYPERLIPEMIA, MIXED: Primary | ICD-10-CM

## 2024-01-30 LAB
ALBUMIN SERPL-MCNC: 4.1 G/DL (ref 3.7–4.7)
ALBUMIN/GLOB SERPL: 1.7 {RATIO} (ref 1.2–2.2)
ALP SERPL-CCNC: 102 IU/L (ref 44–121)
ALT SERPL-CCNC: 10 IU/L (ref 0–44)
AST SERPL-CCNC: 18 IU/L (ref 0–40)
BILIRUB SERPL-MCNC: 0.5 MG/DL (ref 0–1.2)
BUN SERPL-MCNC: 17 MG/DL (ref 8–27)
BUN/CREAT SERPL: 15 (ref 10–24)
CALCIUM SERPL-MCNC: 9.5 MG/DL (ref 8.6–10.2)
CHLORIDE SERPL-SCNC: 107 MMOL/L (ref 96–106)
CHOLEST SERPL-MCNC: 202 MG/DL (ref 100–199)
CO2 SERPL-SCNC: 27 MMOL/L (ref 20–29)
CREAT SERPL-MCNC: 1.12 MG/DL (ref 0.76–1.27)
EGFR: 64 ML/MIN/1.73
GLOBULIN SER-MCNC: 2.4 G/DL (ref 1.5–4.5)
GLUCOSE SERPL-MCNC: 90 MG/DL (ref 70–99)
HDLC SERPL-MCNC: 58 MG/DL
LDLC SERPL CALC-MCNC: 118 MG/DL (ref 0–99)
LDLC/HDLC SERPL: 2 RATIO (ref 0–3.6)
POTASSIUM SERPL-SCNC: 4.1 MMOL/L (ref 3.5–5.2)
PROT SERPL-MCNC: 6.5 G/DL (ref 6–8.5)
SL AMB VLDL CHOLESTEROL CALC: 26 MG/DL (ref 5–40)
SODIUM SERPL-SCNC: 145 MMOL/L (ref 134–144)
TRIGL SERPL-MCNC: 149 MG/DL (ref 0–149)

## 2024-02-07 ENCOUNTER — OFFICE VISIT (OUTPATIENT)
Dept: FAMILY MEDICINE CLINIC | Facility: CLINIC | Age: 84
End: 2024-02-07
Payer: COMMERCIAL

## 2024-02-07 VITALS
WEIGHT: 160.6 LBS | DIASTOLIC BLOOD PRESSURE: 78 MMHG | SYSTOLIC BLOOD PRESSURE: 124 MMHG | BODY MASS INDEX: 22.99 KG/M2 | OXYGEN SATURATION: 99 % | HEIGHT: 70 IN

## 2024-02-07 DIAGNOSIS — Z00.00 MEDICARE ANNUAL WELLNESS VISIT, SUBSEQUENT: Primary | ICD-10-CM

## 2024-02-07 DIAGNOSIS — E78.2 HYPERLIPEMIA, MIXED: ICD-10-CM

## 2024-02-07 DIAGNOSIS — I48.0 PAROXYSMAL ATRIAL FIBRILLATION (HCC): ICD-10-CM

## 2024-02-07 DIAGNOSIS — N18.31 STAGE 3A CHRONIC KIDNEY DISEASE (HCC): ICD-10-CM

## 2024-02-07 DIAGNOSIS — N40.0 PROSTATE HYPERPLASIA, BENIGN LOCALIZED, WITHOUT URINARY OBSTRUCTION: ICD-10-CM

## 2024-02-07 PROCEDURE — 99214 OFFICE O/P EST MOD 30 MIN: CPT | Performed by: FAMILY MEDICINE

## 2024-02-07 PROCEDURE — G0439 PPPS, SUBSEQ VISIT: HCPCS | Performed by: FAMILY MEDICINE

## 2024-02-07 NOTE — PATIENT INSTRUCTIONS
Medicare Preventive Visit Patient Instructions  Thank you for completing your Welcome to Medicare Visit or Medicare Annual Wellness Visit today. Your next wellness visit will be due in one year (2/7/2025).  The screening/preventive services that you may require over the next 5-10 years are detailed below. Some tests may not apply to you based off risk factors and/or age. Screening tests ordered at today's visit but not completed yet may show as past due. Also, please note that scanned in results may not display below.  Preventive Screenings:  Service Recommendations Previous Testing/Comments   Colorectal Cancer Screening  Colonoscopy    Fecal Occult Blood Test (FOBT)/Fecal Immunochemical Test (FIT)  Fecal DNA/Cologuard Test  Flexible Sigmoidoscopy Age: 45-75 years old   Colonoscopy: every 10 years (May be performed more frequently if at higher risk)  OR  FOBT/FIT: every 1 year  OR  Cologuard: every 3 years  OR  Sigmoidoscopy: every 5 years  Screening may be recommended earlier than age 45 if at higher risk for colorectal cancer. Also, an individualized decision between you and your healthcare provider will decide whether screening between the ages of 76-85 would be appropriate. Colonoscopy: 10/03/2013  FOBT/FIT: Not on file  Cologuard: Not on file  Sigmoidoscopy: Not on file          Prostate Cancer Screening Individualized decision between patient and health care provider in men between ages of 55-69   Medicare will cover every 12 months beginning on the day after your 50th birthday PSA: No results in last 5 years           Hepatitis C Screening Once for adults born between 1945 and 1965  More frequently in patients at high risk for Hepatitis C Hep C Antibody: Not on file        Diabetes Screening 1-2 times per year if you're at risk for diabetes or have pre-diabetes Fasting glucose: No results in last 5 years (No results in last 5 years)  A1C: No results in last 5 years (No results in last 5 years)       Cholesterol Screening Once every 5 years if you don't have a lipid disorder. May order more often based on risk factors. Lipid panel: 01/29/2024         Other Preventive Screenings Covered by Medicare:  Abdominal Aortic Aneurysm (AAA) Screening: covered once if your at risk. You're considered to be at risk if you have a family history of AAA or a male between the age of 65-75 who smoking at least 100 cigarettes in your lifetime.  Lung Cancer Screening: covers low dose CT scan once per year if you meet all of the following conditions: (1) Age 55-77; (2) No signs or symptoms of lung cancer; (3) Current smoker or have quit smoking within the last 15 years; (4) You have a tobacco smoking history of at least 20 pack years (packs per day x number of years you smoked); (5) You get a written order from a healthcare provider.  Glaucoma Screening: covered annually if you're considered high risk: (1) You have diabetes OR (2) Family history of glaucoma OR (3)  aged 50 and older OR (4)  American aged 65 and older  Osteoporosis Screening: covered every 2 years if you meet one of the following conditions: (1) Have a vertebral abnormality; (2) On glucocorticoid therapy for more than 3 months; (3) Have primary hyperparathyroidism; (4) On osteoporosis medications and need to assess response to drug therapy.  HIV Screening: covered annually if you're between the age of 15-65. Also covered annually if you are younger than 15 and older than 65 with risk factors for HIV infection. For pregnant patients, it is covered up to 3 times per pregnancy.    Immunizations:  Immunization Recommendations   Influenza Vaccine Annual influenza vaccination during flu season is recommended for all persons aged >= 6 months who do not have contraindications   Pneumococcal Vaccine   * Pneumococcal conjugate vaccine = PCV13 (Prevnar 13), PCV15 (Vaxneuvance), PCV20 (Prevnar 20)  * Pneumococcal polysaccharide vaccine = PPSV23  (Pneumovax) Adults 19-63 yo with certain risk factors or if 65+ yo  If never received any pneumonia vaccine: recommend Prevnar 20 (PCV20)  Give PCV20 if previously received 1 dose of PCV13 or PPSV23   Hepatitis B Vaccine 3 dose series if at intermediate or high risk (ex: diabetes, end stage renal disease, liver disease)   Respiratory syncytial virus (RSV) Vaccine - COVERED BY MEDICARE PART D  * RSVPreF3 (Arexvy) CDC recommends that adults 60 years of age and older may receive a single dose of RSV vaccine using shared clinical decision-making (SCDM)   Tetanus (Td) Vaccine - COST NOT COVERED BY MEDICARE PART B Following completion of primary series, a booster dose should be given every 10 years to maintain immunity against tetanus. Td may also be given as tetanus wound prophylaxis.   Tdap Vaccine - COST NOT COVERED BY MEDICARE PART B Recommended at least once for all adults. For pregnant patients, recommended with each pregnancy.   Shingles Vaccine (Shingrix) - COST NOT COVERED BY MEDICARE PART B  2 shot series recommended in those 19 years and older who have or will have weakened immune systems or those 50 years and older     Health Maintenance Due:  There are no preventive care reminders to display for this patient.  Immunizations Due:      Topic Date Due   • COVID-19 Vaccine (7 - 2023-24 season) 11/21/2023     Advance Directives   What are advance directives?  Advance directives are legal documents that state your wishes and plans for medical care. These plans are made ahead of time in case you lose your ability to make decisions for yourself. Advance directives can apply to any medical decision, such as the treatments you want, and if you want to donate organs.   What are the types of advance directives?  There are many types of advance directives, and each state has rules about how to use them. You may choose a combination of any of the following:  Living will:  This is a written record of the treatment you want.  You can also choose which treatments you do not want, which to limit, and which to stop at a certain time. This includes surgery, medicine, IV fluid, and tube feedings.   Durable power of  for healthcare (DPAHC):  This is a written record that states who you want to make healthcare choices for you when you are unable to make them for yourself. This person, called a proxy, is usually a family member or a friend. You may choose more than 1 proxy.  Do not resuscitate (DNR) order:  A DNR order is used in case your heart stops beating or you stop breathing. It is a request not to have certain forms of treatment, such as CPR. A DNR order may be included in other types of advance directives.  Medical directive:  This covers the care that you want if you are in a coma, near death, or unable to make decisions for yourself. You can list the treatments you want for each condition. Treatment may include pain medicine, surgery, blood transfusions, dialysis, IV or tube feedings, and a ventilator (breathing machine).  Values history:  This document has questions about your views, beliefs, and how you feel and think about life. This information can help others choose the care that you would choose.  Why are advance directives important?  An advance directive helps you control your care. Although spoken wishes may be used, it is better to have your wishes written down. Spoken wishes can be misunderstood, or not followed. Treatments may be given even if you do not want them. An advance directive may make it easier for your family to make difficult choices about your care.       © Copyright [employer] 2018 Information is for End User's use only and may not be sold, redistributed or otherwise used for commercial purposes. All illustrations and images included in CareNotes® are the copyrighted property of [email or web address] or "[employer]."

## 2024-02-07 NOTE — PROGRESS NOTES
Assessment and Plan:     Problem List Items Addressed This Visit       Paroxysmal atrial fibrillation (HCC)    Prostate hyperplasia, benign localized, without urinary obstruction    Hyperlipemia, mixed    Stage 3a chronic kidney disease (HCC)     Other Visit Diagnoses       Medicare annual wellness visit, subsequent    -  Primary             Preventive health issues were discussed with patient, and age appropriate screening tests were ordered as noted in patient's After Visit Summary.  Personalized health advice and appropriate referrals for health education or preventive services given if needed, as noted in patient's After Visit Summary.     History of Present Illness:     Patient presents for a Medicare Wellness Visit    HPI   Patient Care Team:  Moshe Ng MD as PCP - General (Family Medicine)  MD Alcides Dey MD Paul Marion, MD Jerome Burke, MD Francis Burt, MD     Review of Systems:     Review of Systems   Constitutional:  Negative for fatigue, fever and unexpected weight change.   HENT:  Negative for congestion, sinus pain and sore throat.    Eyes:  Negative for visual disturbance.   Respiratory:  Negative for shortness of breath and wheezing.    Cardiovascular:  Negative for chest pain and palpitations.   Gastrointestinal:  Negative for abdominal pain, nausea and vomiting.   Musculoskeletal: Negative.  Negative for arthralgias and myalgias.   Neurological:  Negative for syncope, weakness and numbness.   Psychiatric/Behavioral: Negative.  Negative for confusion, dysphoric mood and suicidal ideas.         Problem List:     Patient Active Problem List   Diagnosis    Paroxysmal atrial fibrillation (HCC)    Basal cell carcinoma of skin    Benign neoplasm of large intestine    Recurrent major depressive disorder, in partial remission (HCC)    Degenerative joint disease    Prostate hyperplasia, benign localized, without urinary obstruction    Hyperlipemia, mixed    Melanoma in situ of  nose (HCC)    Stage 3a chronic kidney disease (HCC)      Past Medical and Surgical History:     Past Medical History:   Diagnosis Date    A-fib (HCC)     Atrial fibrillation (HCC)     Basal cell carcinoma     last assessed 10/1/15, resolved 12/15/17    Benign neoplasm of large intestine     last assessed 11/22/16, resolved 12/15/17     Closed displaced fracture of shaft of right clavicle     last assessed 10/20/16, resolved 12/15/17     Diverticulitis of colon     last assessed 3/7/14, resolved 12/15/17     Dysphagia     last assessed 1/20/14, resolved 11/25/15    Hyperlipidemia     Melanoma in situ (HCC)     last assessed 2/15/16, resolved 12/15/17      Past Surgical History:   Procedure Laterality Date    ABSCESS DRAINAGE      Retroperitoneal abscess    ANASTOMOSIS  12/11/2013    Sigmoid colon    COLONOSCOPY      COLOSTOMY  12/11/2013    Temporary    ILEOSTOMY REVISION  02/11/2014    reversal    VASECTOMY        Family History:     Family History   Problem Relation Age of Onset    No Known Problems Mother     Substance Abuse Neg Hx     Mental illness Neg Hx       Social History:     Social History     Socioeconomic History    Marital status: /Civil Union     Spouse name: None    Number of children: None    Years of education: None    Highest education level: None   Occupational History    None   Tobacco Use    Smoking status: Never    Smokeless tobacco: Never   Substance and Sexual Activity    Alcohol use: No    Drug use: No    Sexual activity: None   Other Topics Concern    None   Social History Narrative    None     Social Determinants of Health     Financial Resource Strain: Low Risk  (2/7/2024)    Overall Financial Resource Strain (CARDIA)     Difficulty of Paying Living Expenses: Not hard at all   Food Insecurity: Not on file   Transportation Needs: No Transportation Needs (2/7/2024)    PRAPARE - Transportation     Lack of Transportation (Medical): No     Lack of Transportation (Non-Medical): No    Physical Activity: Not on file   Stress: Not on file   Social Connections: Not on file   Intimate Partner Violence: Not on file   Housing Stability: Not on file      Medications and Allergies:     Current Outpatient Medications   Medication Sig Dispense Refill    apixaban (Eliquis) 5 mg Take 1 tablet (5 mg total) by mouth 2 (two) times a day 180 tablet 3    diltiazem (CARDIZEM CD) 240 mg 24 hr capsule TAKE 1 CAPSULE BY MOUTH EVERY DAY 90 capsule 1    flecainide (TAMBOCOR) 100 mg tablet TAKE 1 TABLET BY MOUTH TWICE A  tablet 1    FLUoxetine (PROzac) 10 mg capsule Take 1 capsule (10 mg total) by mouth daily 90 capsule 3    glucosamine-chondroitin 500-400 MG tablet Take 1 tablet by mouth 2 (two) times a day.      lactobacillus acidophilus one capsule daily      multivitamin-iron-minerals-folic acid (CENTRUM) chewable tablet Chew 1 tablet daily.      Probiotic Product (PROBIOTIC-10 PO) Take 10 mg by mouth daily      psyllium (METAMUCIL) 58.6 % powder Take 1 packet by mouth 3 (three) times a day      Saw Craig 450 MG CAPS Take 450 mg by mouth daily       No current facility-administered medications for this visit.     No Known Allergies   Immunizations:     Immunization History   Administered Date(s) Administered    COVID-19 MODERNA VACC 0.5 ML IM 01/27/2021, 02/23/2021, 10/22/2021, 04/11/2022    COVID-19 Pfizer Vac BIVALENT Gwyn-sucrose 12 Yr+ IM 09/14/2022    COVID-19 Pfizer mRNA vacc PF gwyn-sucrose 12 yr and older (Comirnaty) 09/26/2023    INFLUENZA 10/19/2017, 10/01/2018, 09/15/2021    Influenza Split High Dose Preservative Free IM 10/01/2015, 10/14/2016, 10/07/2019    Influenza, high dose seasonal 0.7 mL 10/01/2018    Influenza, seasonal, injectable 09/18/2014, 10/01/2017    Pneumococcal Conjugate 13-Valent 04/01/2016    Pneumococcal Polysaccharide PPV23 10/30/2006, 06/24/2019    Td (adult), adsorbed 07/18/2005      Health Maintenance:     There are no preventive care reminders to display for this  patient.      Topic Date Due    COVID-19 Vaccine (7 - 2023-24 season) 11/21/2023      Medicare Screening Tests and Risk Assessments:     Last Medicare Wellness visit information reviewed, patient interviewed and updates made to the record today.      Health Risk Assessment:   Patient rates overall health as good. Patient feels that their physical health rating is same. Patient is satisfied with their life. Eyesight was rated as same. Hearing was rated as same. Patient feels that their emotional and mental health rating is same. Patients states they are never, rarely angry. Patient states they are sometimes unusually tired/fatigued. Pain experienced in the last 7 days has been none. Patient states that he has experienced no weight loss or gain in last 6 months.     Depression Screening:   PHQ-2 Score: 0  PHQ-9 Score: 0      Fall Risk Screening:   In the past year, patient has experienced: no history of falling in past year      Home Safety:  Patient does not have trouble with stairs inside or outside of their home. Patient has working smoke alarms and has working carbon monoxide detector. Home safety hazards include: none.     Nutrition:   Current diet is Regular.     Medications:   Patient is currently taking over-the-counter supplements. OTC medications include: see medication list. Patient is able to manage medications.     Activities of Daily Living (ADLs)/Instrumental Activities of Daily Living (IADLs):   Walk and transfer into and out of bed and chair?: Yes  Dress and groom yourself?: Yes    Bathe or shower yourself?: Yes    Feed yourself? Yes  Do your laundry/housekeeping?: Yes  Manage your money, pay your bills and track your expenses?: Yes  Make your own meals?: Yes    Do your own shopping?: Yes    Previous Hospitalizations:   Any hospitalizations or ED visits within the last 12 months?: No      Advance Care Planning:   Living will: Yes    Durable POA for healthcare: Yes    Advanced directive: Yes   "  Provider agrees with end of life decisions: Yes      Cognitive Screening:   Provider or family/friend/caregiver concerned regarding cognition?: No    PREVENTIVE SCREENINGS      Cardiovascular Screening:    General: History Lipid Disorder and Screening Current      Diabetes Screening:     General: Screening Current      Colorectal Cancer Screening:     General: Screening Not Indicated      Prostate Cancer Screening:    General: Screening Not Indicated      Osteoporosis Screening:    General: Screening Not Indicated      Abdominal Aortic Aneurysm (AAA) Screening:        General: Screening Not Indicated      Lung Cancer Screening:     General: Screening Not Indicated      Hepatitis C Screening:    General: Screening Not Indicated    Screening, Brief Intervention, and Referral to Treatment (SBIRT)    Screening      Single Item Drug Screening:  How often have you used an illegal drug (including marijuana) or a prescription medication for non-medical reasons in the past year? never    Single Item Drug Screen Score: 0  Interpretation: Negative screen for possible drug use disorder    No results found.     Physical Exam:     /78 (BP Location: Left arm, Patient Position: Sitting, Cuff Size: Standard)   Ht 5' 10\" (1.778 m)   Wt 72.8 kg (160 lb 9.6 oz)   SpO2 99%   BMI 23.04 kg/m²     Physical Exam  Vitals and nursing note reviewed.   Constitutional:       General: He is not in acute distress.     Appearance: He is well-developed.   HENT:      Head: Normocephalic and atraumatic.   Eyes:      Conjunctiva/sclera: Conjunctivae normal.   Cardiovascular:      Rate and Rhythm: Normal rate and regular rhythm.      Heart sounds: No murmur heard.  Pulmonary:      Effort: Pulmonary effort is normal. No respiratory distress.      Breath sounds: Normal breath sounds.   Abdominal:      Palpations: Abdomen is soft.      Tenderness: There is no abdominal tenderness.   Musculoskeletal:         General: No swelling.      Cervical " back: Neck supple.   Skin:     General: Skin is warm and dry.      Capillary Refill: Capillary refill takes less than 2 seconds.   Neurological:      Mental Status: He is alert.   Psychiatric:         Mood and Affect: Mood normal.          Moshe Ng MD

## 2024-04-11 DIAGNOSIS — I48.91 ATRIAL FIBRILLATION, UNSPECIFIED TYPE (HCC): Primary | ICD-10-CM

## 2024-04-11 NOTE — TELEPHONE ENCOUNTER
Reason for call:   [x] Refill   [] Prior Auth  [] Other:     Office:   [] PCP/Provider -   [x] Specialty/Provider - cardio    Medication:     does the patient have enough for 3 days?   [x] Yes   [] No - Send as HP to POD

## 2024-04-12 RX ORDER — FLECAINIDE ACETATE 100 MG/1
100 TABLET ORAL 2 TIMES DAILY
Qty: 180 TABLET | Refills: 0 | Status: SHIPPED | OUTPATIENT
Start: 2024-04-12

## 2024-04-12 NOTE — TELEPHONE ENCOUNTER
Requested medication(s) are due for refill today: Yes  Patient has already received a courtesy refill: No  Other reason request has been forwarded to provider: Refill protocol needs dx code

## 2024-07-10 ENCOUNTER — OFFICE VISIT (OUTPATIENT)
Dept: CARDIOLOGY CLINIC | Facility: CLINIC | Age: 84
End: 2024-07-10
Payer: COMMERCIAL

## 2024-07-10 VITALS
DIASTOLIC BLOOD PRESSURE: 68 MMHG | HEART RATE: 68 BPM | SYSTOLIC BLOOD PRESSURE: 140 MMHG | WEIGHT: 156 LBS | HEIGHT: 70 IN | BODY MASS INDEX: 22.33 KG/M2

## 2024-07-10 DIAGNOSIS — I48.19 PERSISTENT ATRIAL FIBRILLATION (HCC): ICD-10-CM

## 2024-07-10 DIAGNOSIS — I48.91 ATRIAL FIBRILLATION, UNSPECIFIED TYPE (HCC): ICD-10-CM

## 2024-07-10 PROCEDURE — 99214 OFFICE O/P EST MOD 30 MIN: CPT | Performed by: INTERNAL MEDICINE

## 2024-07-10 RX ORDER — DILTIAZEM HYDROCHLORIDE 240 MG/1
240 CAPSULE, COATED, EXTENDED RELEASE ORAL DAILY
Qty: 90 CAPSULE | Refills: 3 | Status: SHIPPED | OUTPATIENT
Start: 2024-07-10

## 2024-07-10 RX ORDER — FLECAINIDE ACETATE 100 MG/1
100 TABLET ORAL 2 TIMES DAILY
Qty: 180 TABLET | Refills: 3 | Status: SHIPPED | OUTPATIENT
Start: 2024-07-10

## 2024-07-10 NOTE — PROGRESS NOTES
Cardiology Follow Up    Curtis Kong  1940  [id number]  [facility] [place] [address] [address]  [address] [address] [phone]    1. Persistent atrial fibrillation (HCC)  diltiazem (CARDIZEM CD) 240 mg 24 hr capsule      2. Atrial fibrillation, unspecified type (HCC)  flecainide (TAMBOCOR) 100 mg tablet          Interval History: Followup atrial fibrillation.     No chest pain, dyspnea or palpitations. No symptomatic PAF.     Medical Problems       Problem List       Paroxysmal atrial fibrillation (HCC)    Basal cell carcinoma of skin    Benign neoplasm of large intestine    Recurrent major depressive disorder, in partial remission (HCC)    Degenerative joint disease    Prostate hyperplasia, benign localized, without urinary obstruction    Hyperlipemia, mixed    Melanoma in situ of nose (HCC)    Overview Signed 12/2/2021 10:44 AM by Elder Majano MD     Remote history of melanoma.  Had excision.  Sees dermatologist yearly for surveillance.  Patient believes it was taken off 5 years ago.         Stage 3a chronic kidney disease (HCC)    Lab Results   Component Value Date    EGFR 64 01/29/2024    EGFR 59 (L) 01/10/2023    CREATININE 1.12 01/29/2024    CREATININE 1.21 01/10/2023    CREATININE 1.13 11/22/2021             Past Medical History:   Diagnosis Date    A-fib (HCC)     Atrial fibrillation (HCC)     Basal cell carcinoma     last assessed 10/1/15, resolved 12/15/17    Benign neoplasm of large intestine     last assessed 11/22/16, resolved 12/15/17     Closed displaced fracture of shaft of right clavicle     last assessed 10/20/16, resolved 12/15/17     Diverticulitis of colon     last assessed 3/7/14, resolved 12/15/17     Dysphagia     last assessed 1/20/14, resolved 11/25/15    Hyperlipidemia     Melanoma in situ (HCC)     last assessed 2/15/16, resolved 12/15/17      Social History     Socioeconomic History    Marital  status: /Civil Union     Spouse name: Not on file    Number of children: Not on file    Years of education: Not on file    Highest education level: Not on file   Occupational History    Not on file   Tobacco Use    Smoking status: Never    Smokeless tobacco: Never   Substance and Sexual Activity    Alcohol use: No    Drug use: No    Sexual activity: Not on file   Other Topics Concern    Not on file   Social History Narrative    Not on file     Social Determinants of Health     Financial Resource Strain: Low Risk  (2/7/2024)    Overall Financial Resource Strain (CARDIA)     Difficulty of Paying Living Expenses: Not hard at all   Food Insecurity: Not on file   Transportation Needs: No Transportation Needs (2/7/2024)    PRAPARE - Transportation     Lack of Transportation (Medical): No     Lack of Transportation (Non-Medical): No   Physical Activity: Not on file   Stress: Not on file   Social Connections: Not on file   Intimate Partner Violence: Not on file   Housing Stability: Not on file      Family History   Problem Relation Age of Onset    No Known Problems Mother     Substance Abuse Neg Hx     Mental illness Neg Hx      Past Surgical History:   Procedure Laterality Date    ABSCESS DRAINAGE      Retroperitoneal abscess    ANASTOMOSIS  12/11/2013    Sigmoid colon    COLONOSCOPY      COLOSTOMY  12/11/2013    Temporary    ILEOSTOMY REVISION  02/11/2014    reversal    VASECTOMY         Current Outpatient Medications:     apixaban (Eliquis) 5 mg, Take 1 tablet (5 mg total) by mouth 2 (two) times a day, Disp: 180 tablet, Rfl: 3    diltiazem (CARDIZEM CD) 240 mg 24 hr capsule, Take 1 capsule (240 mg total) by mouth daily, Disp: 90 capsule, Rfl: 3    flecainide (TAMBOCOR) 100 mg tablet, Take 1 tablet (100 mg total) by mouth 2 (two) times a day, Disp: 180 tablet, Rfl: 3    FLUoxetine (PROzac) 10 mg capsule, Take 1 capsule (10 mg total) by mouth daily, Disp: 90 capsule, Rfl: 3    glucosamine-chondroitin 500-400 MG  tablet, Take 1 tablet by mouth 2 (two) times a day., Disp: , Rfl:     lactobacillus acidophilus, one capsule daily, Disp: , Rfl:     multivitamin-iron-minerals-folic acid (CENTRUM) chewable tablet, Chew 1 tablet daily., Disp: , Rfl:     Probiotic Product (PROBIOTIC-10 PO), Take 10 mg by mouth daily, Disp: , Rfl:     psyllium (METAMUCIL) 58.6 % powder, Take 1 packet by mouth 3 (three) times a day, Disp: , Rfl:     Saw Palmetto 450 MG CAPS, Take 450 mg by mouth daily, Disp: , Rfl:   No Known Allergies    Labs:     Chemistry        Component Value Date/Time     12/08/2017 0953    K 4.1 01/29/2024 0920     (H) 01/29/2024 0920    CO2 27 01/29/2024 0920    BUN 17 01/29/2024 0920    CREATININE 1.12 01/29/2024 0920    CREATININE 1.19 12/08/2017 0953        Component Value Date/Time    CALCIUM 9.7 12/08/2017 0953    ALKPHOS 110 12/08/2017 0953    AST 18 01/29/2024 0920    ALT 10 01/29/2024 0920    BILITOT 0.6 12/08/2017 0953            Lab Results   Component Value Date    CHOL 216 (H) 12/08/2017    CHOL 230 (H) 11/14/2016    CHOL 208 (H) 09/03/2015     Lab Results   Component Value Date    HDL 58 01/29/2024    HDL 65 01/10/2023    HDL 65 11/22/2021     Lab Results   Component Value Date    LDLCALC 118 (H) 01/29/2024    LDLCALC 144 (H) 01/10/2023    LDLCALC 129 (H) 11/22/2021     Lab Results   Component Value Date    TRIG 149 01/29/2024    TRIG 132 01/10/2023    TRIG 109 11/22/2021     Lab Results   Component Value Date    CHOLHDL 3.6 01/10/2023    CHOLHDL 3.3 05/26/2021    CHOLHDL 3.4 06/18/2019       Imaging: No results found.    EKG: .    Review of Systems   Constitutional: Negative.   HENT: Negative.     Eyes: Negative.    Cardiovascular: Negative.    Respiratory: Negative.     Endocrine: Negative.    Hematologic/Lymphatic: Negative.    Skin: Negative.    Musculoskeletal: Negative.    Gastrointestinal: Negative.    Genitourinary: Negative.    Neurological: Negative.    Psychiatric/Behavioral: Negative.      Allergic/Immunologic: Negative.        Vitals:    07/10/24 1017   BP: 140/68   Pulse: 68           Physical Exam  Vitals reviewed.   Constitutional:       Appearance: Normal appearance.   HENT:      Head: Normocephalic.      Nose: Nose normal.      Mouth/Throat:      Mouth: Mucous membranes are moist.      Pharynx: Oropharynx is clear.   Eyes:      General: No scleral icterus.     Conjunctiva/sclera: Conjunctivae normal.   Cardiovascular:      Rate and Rhythm: Normal rate and regular rhythm.      Heart sounds: No murmur heard.     No friction rub. No gallop.   Pulmonary:      Effort: Pulmonary effort is normal. No respiratory distress.      Breath sounds: Normal breath sounds. No wheezing or rales.   Abdominal:      General: Abdomen is flat. Bowel sounds are normal. There is no distension.      Palpations: Abdomen is soft.      Tenderness: There is no abdominal tenderness. There is no guarding.   Musculoskeletal:      Cervical back: Normal range of motion and neck supple.      Right lower leg: No edema.      Left lower leg: No edema.   Skin:     General: Skin is warm and dry.   Neurological:      General: No focal deficit present.      Mental Status: He is alert and oriented to person, place, and time.   Psychiatric:         Mood and Affect: Mood normal.         Behavior: Behavior normal.         Discussion/Summary:    Persistent Atrial Fibrillation: Overall doing well. Asymptomatic. No changes today. Continue with anticoagulation, flecainide and diltiazem.       The patient was counseled regarding diagnostic results, instructions for management, risk factor reductions, impressions. total time of encounter was 25 minutes and 15 minutes was spent counseling.

## 2024-07-27 ENCOUNTER — HOSPITAL ENCOUNTER (EMERGENCY)
Facility: HOSPITAL | Age: 84
Discharge: HOME | End: 2024-07-27
Attending: STUDENT IN AN ORGANIZED HEALTH CARE EDUCATION/TRAINING PROGRAM
Payer: COMMERCIAL

## 2024-07-27 VITALS
HEART RATE: 63 BPM | SYSTOLIC BLOOD PRESSURE: 165 MMHG | RESPIRATION RATE: 18 BRPM | DIASTOLIC BLOOD PRESSURE: 79 MMHG | OXYGEN SATURATION: 97 % | TEMPERATURE: 99 F

## 2024-07-27 DIAGNOSIS — N39.0 UTI (URINARY TRACT INFECTION): ICD-10-CM

## 2024-07-27 DIAGNOSIS — R31.9 HEMATURIA: Primary | ICD-10-CM

## 2024-07-27 LAB
ANION GAP SERPL CALCULATED.3IONS-SCNC: 9 MMOL/L (ref 4–13)
APTT PPP: 41 SECONDS (ref 23–37)
BACTERIA UR QL AUTO: ABNORMAL /HPF
BASOPHILS # BLD AUTO: 0.05 THOUSANDS/ÂΜL (ref 0–0.1)
BASOPHILS NFR BLD AUTO: 1 % (ref 0–1)
BILIRUB UR QL STRIP: NEGATIVE
BUN SERPL-MCNC: 16 MG/DL (ref 5–25)
CALCIUM SERPL-MCNC: 10.1 MG/DL (ref 8.4–10.2)
CHLORIDE SERPL-SCNC: 106 MMOL/L (ref 96–108)
CLARITY UR: ABNORMAL
CO2 SERPL-SCNC: 27 MMOL/L (ref 21–32)
COLOR UR: ABNORMAL
CREAT SERPL-MCNC: 1.12 MG/DL (ref 0.6–1.3)
EOSINOPHIL # BLD AUTO: 0.07 THOUSAND/ÂΜL (ref 0–0.61)
EOSINOPHIL NFR BLD AUTO: 1 % (ref 0–6)
ERYTHROCYTE [DISTWIDTH] IN BLOOD BY AUTOMATED COUNT: 13.7 % (ref 11.6–15.1)
GFR SERPL CREATININE-BSD FRML MDRD: 59 ML/MIN/1.73SQ M
GLUCOSE SERPL-MCNC: 110 MG/DL (ref 65–140)
GLUCOSE UR STRIP-MCNC: NEGATIVE MG/DL
HCT VFR BLD AUTO: 46.2 % (ref 36.5–49.3)
HGB BLD-MCNC: 14.9 G/DL (ref 12–17)
HGB UR QL STRIP.AUTO: ABNORMAL
IMM GRANULOCYTES # BLD AUTO: 0.03 THOUSAND/UL (ref 0–0.2)
IMM GRANULOCYTES NFR BLD AUTO: 0 % (ref 0–2)
KETONES UR STRIP-MCNC: NEGATIVE MG/DL
LEUKOCYTE ESTERASE UR QL STRIP: ABNORMAL
LYMPHOCYTES # BLD AUTO: 2.82 THOUSANDS/ÂΜL (ref 0.6–4.47)
LYMPHOCYTES NFR BLD AUTO: 35 % (ref 14–44)
MCH RBC QN AUTO: 30.7 PG (ref 26.8–34.3)
MCHC RBC AUTO-ENTMCNC: 32.3 G/DL (ref 31.4–37.4)
MCV RBC AUTO: 95 FL (ref 82–98)
MONOCYTES # BLD AUTO: 0.98 THOUSAND/ÂΜL (ref 0.17–1.22)
MONOCYTES NFR BLD AUTO: 12 % (ref 4–12)
NEUTROPHILS # BLD AUTO: 4.21 THOUSANDS/ÂΜL (ref 1.85–7.62)
NEUTS SEG NFR BLD AUTO: 51 % (ref 43–75)
NITRITE UR QL STRIP: NEGATIVE
NON-SQ EPI CELLS URNS QL MICRO: ABNORMAL /HPF
NRBC BLD AUTO-RTO: 0 /100 WBCS
PH UR STRIP.AUTO: 6.5 [PH]
PLATELET # BLD AUTO: 325 THOUSANDS/UL (ref 149–390)
PMV BLD AUTO: 9.3 FL (ref 8.9–12.7)
POTASSIUM SERPL-SCNC: 3.6 MMOL/L (ref 3.5–5.3)
PROT UR STRIP-MCNC: ABNORMAL MG/DL
RBC # BLD AUTO: 4.86 MILLION/UL (ref 3.88–5.62)
RBC #/AREA URNS AUTO: ABNORMAL /HPF
SODIUM SERPL-SCNC: 142 MMOL/L (ref 135–147)
SP GR UR STRIP.AUTO: 1.02 (ref 1–1.03)
UROBILINOGEN UR STRIP-ACNC: <2 MG/DL
WBC # BLD AUTO: 8.16 THOUSAND/UL (ref 4.31–10.16)
WBC #/AREA URNS AUTO: ABNORMAL /HPF

## 2024-07-27 PROCEDURE — 85025 COMPLETE CBC W/AUTO DIFF WBC: CPT | Performed by: STUDENT IN AN ORGANIZED HEALTH CARE EDUCATION/TRAINING PROGRAM

## 2024-07-27 PROCEDURE — 85730 THROMBOPLASTIN TIME PARTIAL: CPT | Performed by: STUDENT IN AN ORGANIZED HEALTH CARE EDUCATION/TRAINING PROGRAM

## 2024-07-27 PROCEDURE — 99284 EMERGENCY DEPT VISIT MOD MDM: CPT | Performed by: STUDENT IN AN ORGANIZED HEALTH CARE EDUCATION/TRAINING PROGRAM

## 2024-07-27 PROCEDURE — 80048 BASIC METABOLIC PNL TOTAL CA: CPT | Performed by: STUDENT IN AN ORGANIZED HEALTH CARE EDUCATION/TRAINING PROGRAM

## 2024-07-27 PROCEDURE — 87086 URINE CULTURE/COLONY COUNT: CPT | Performed by: STUDENT IN AN ORGANIZED HEALTH CARE EDUCATION/TRAINING PROGRAM

## 2024-07-27 PROCEDURE — 81001 URINALYSIS AUTO W/SCOPE: CPT | Performed by: STUDENT IN AN ORGANIZED HEALTH CARE EDUCATION/TRAINING PROGRAM

## 2024-07-27 PROCEDURE — 99283 EMERGENCY DEPT VISIT LOW MDM: CPT

## 2024-07-27 PROCEDURE — 36415 COLL VENOUS BLD VENIPUNCTURE: CPT | Performed by: STUDENT IN AN ORGANIZED HEALTH CARE EDUCATION/TRAINING PROGRAM

## 2024-07-27 RX ORDER — NITROFURANTOIN 25; 75 MG/1; MG/1
100 CAPSULE ORAL 2 TIMES DAILY
Qty: 14 CAPSULE | Refills: 0 | Status: SHIPPED | OUTPATIENT
Start: 2024-07-27 | End: 2024-08-03

## 2024-07-27 NOTE — DISCHARGE INSTRUCTIONS
Take all of your antibiotics as prescribed.  Please call your urologist on Monday to set up a follow-up appointment.    Please return here as soon as possible if you develop any urinary retention: You have difficulty emptying your bladder or you are not able to void at all.  Return for any high fevers, back pain, shaking chills, or vomiting.

## 2024-07-27 NOTE — ED PROVIDER NOTES
"History  Chief Complaint   Patient presents with    Blood in Urine     Pt reports \"I'm passing blood in my urine\" Reports bright yellow urine. Follows up with urologist. Reports hx of bloody urine.      Patient is an 84-year-old male who presents with blood in his urine.  This started around 4 AM.  He has had this before but it is stopped on his own.  He does see a urologist for his BPH.  He is on Eliquis for atrial fibrillation.  He states that there is been some small clots but he is still able to urinate.  He has not had any feelings of incomplete emptying or pelvic pressure or pain.  He denies any fever or chills.  He does have urinary frequency but states that this is baseline for him.  He denies any chills, rigors, fevers, nausea.  He denies any lightheadedness, dizziness, or near syncope          Prior to Admission Medications   Prescriptions Last Dose Informant Patient Reported? Taking?   FLUoxetine (PROzac) 10 mg capsule  Self No No   Sig: Take 1 capsule (10 mg total) by mouth daily   Probiotic Product (PROBIOTIC-10 PO)  Self Yes No   Sig: Take 10 mg by mouth daily   Saw [place] 450 MG CAPS  Self Yes No   Sig: Take 450 mg by mouth daily   apixaban (Eliquis) 5 mg  Self No No   Sig: Take 1 tablet (5 mg total) by mouth 2 (two) times a day   diltiazem (CARDIZEM CD) 240 mg 24 hr capsule   No No   Sig: Take 1 capsule (240 mg total) by mouth daily   flecainide (TAMBOCOR) 100 mg tablet   No No   Sig: Take 1 tablet (100 mg total) by mouth 2 (two) times a day   glucosamine-chondroitin 500-400 MG tablet  Self Yes No   Sig: Take 1 tablet by mouth 2 (two) times a day.   lactobacillus acidophilus  Self Yes No   Sig: one capsule daily   multivitamin-iron-minerals-folic acid (CENTRUM) chewable tablet  Self Yes No   Sig: Chew 1 tablet daily.   psyllium (METAMUCIL) 58.6 % powder  Self Yes No   Sig: Take 1 packet by mouth 3 (three) times a day      Facility-Administered Medications: None       Past Medical History: "   Diagnosis Date    A-fib (HCC)     Atrial fibrillation (HCC)     Basal cell carcinoma     last assessed 10/1/15, resolved 12/15/17    Benign neoplasm of large intestine     last assessed 11/22/16, resolved 12/15/17     Closed displaced fracture of shaft of right clavicle     last assessed 10/20/16, resolved 12/15/17     Diverticulitis of colon     last assessed 3/7/14, resolved 12/15/17     Dysphagia     last assessed 1/20/14, resolved 11/25/15    Hyperlipidemia     Melanoma in situ (HCC)     last assessed 2/15/16, resolved 12/15/17        Past Surgical History:   Procedure Laterality Date    ABSCESS DRAINAGE      Retroperitoneal abscess    ANASTOMOSIS  12/11/2013    Sigmoid colon    COLONOSCOPY      COLOSTOMY  12/11/2013    Temporary    ILEOSTOMY REVISION  02/11/2014    reversal    VASECTOMY         Family History   Problem Relation Age of Onset    No Known Problems Mother     Substance Abuse Neg Hx     Mental illness Neg Hx      I have reviewed and agree with the history as documented.    E-Cigarette/Vaping     E-Cigarette/Vaping Substances     Social History     Tobacco Use    Smoking status: Never    Smokeless tobacco: Never   Substance Use Topics    Alcohol use: No    Drug use: No       Review of Systems   Constitutional:  Negative for chills and fever.   HENT:  Negative for ear pain and sore throat.    Eyes:  Negative for pain and visual disturbance.   Respiratory:  Negative for cough and shortness of breath.    Cardiovascular:  Negative for chest pain and palpitations.   Gastrointestinal:  Negative for abdominal pain and vomiting.   Genitourinary:  Positive for decreased urine volume, difficulty urinating, frequency, hematuria and urgency. Negative for dysuria, flank pain, penile discharge, penile swelling, scrotal swelling and testicular pain.   Musculoskeletal:  Negative for arthralgias and back pain.   Skin:  Negative for color change and rash.   Neurological:  Negative for dizziness, seizures, syncope  and light-headedness.   Hematological:  Bruises/bleeds easily.   Psychiatric/Behavioral: Negative.     All other systems reviewed and are negative.      Physical Exam  Physical Exam  Vitals and nursing note reviewed.   Constitutional:       General: He is not in acute distress.     Appearance: Normal appearance. He is well-developed. He is not toxic-appearing.   HENT:      Head: Normocephalic and atraumatic.      Nose: Nose normal.      Mouth/Throat:      Mouth: Mucous membranes are moist.      Pharynx: Oropharynx is clear.   Eyes:      Conjunctiva/sclera: Conjunctivae normal.      Pupils: Pupils are equal, round, and reactive to light.   Cardiovascular:      Rate and Rhythm: Normal rate and regular rhythm.      Heart sounds: No murmur heard.  Pulmonary:      Effort: Pulmonary effort is normal. No respiratory distress.      Breath sounds: Normal breath sounds.   Abdominal:      General: There is no distension.      Palpations: Abdomen is soft.      Tenderness: There is no abdominal tenderness. There is no right CVA tenderness, left CVA tenderness or guarding.      Hernia: No hernia is present.   Genitourinary:     Penis: Normal.       Testes: Normal.   Musculoskeletal:         General: No swelling.      Cervical back: Neck supple.   Skin:     General: Skin is warm and dry.      Capillary Refill: Capillary refill takes less than 2 seconds.   Neurological:      General: No focal deficit present.      Mental Status: He is alert.   Psychiatric:         Mood and Affect: Mood normal.         Vital Signs  ED Triage Vitals [07/27/24 1539]   Temperature Pulse Respirations Blood Pressure SpO2   99 °F (37.2 °C) 76 20 (!) 181/86 96 %      Temp Source Heart Rate Source Patient Position - Orthostatic VS BP Location FiO2 (%)   Temporal Monitor Sitting Right arm --      Pain Score       --           Vitals:    07/27/24 1600 07/27/24 1630 07/27/24 1729 07/27/24 1731   BP: 164/77 160/74  165/79   Pulse: 70 66 63    Patient Position -  Orthostatic VS:             Visual Acuity      ED Medications  Medications - No data to display    Diagnostic Studies  Results Reviewed       Procedure Component Value Units Date/Time    Urine Microscopic [[id number]]  (Abnormal) Collected: 07/27/24 1600    Lab Status: Final result Specimen: Urine, Clean Catch Updated: 07/27/24 1623     RBC, UA Innumerable /hpf      WBC, UA       Field obscured, unable to enumerate     /hpf     Epithelial Cells       Field obscured, unable to enumerate     /hpf     Bacteria, UA       Field obscured, unable to enumerate     /hpf    Urine culture [[id number]] Collected: 07/27/24 1600    Lab Status: In process Specimen: Urine, Clean Catch Updated: 07/27/24 1623    APTT [[id number]]  (Abnormal) Collected: 07/27/24 1601    Lab Status: Final result Specimen: Blood from Arm, Right Updated: 07/27/24 1623     PTT 41 seconds     Basic metabolic panel [[id number]] Collected: 07/27/24 1601    Lab Status: Final result Specimen: Blood from Arm, Right Updated: 07/27/24 1621     Sodium 142 mmol/L      Potassium 3.6 mmol/L      Chloride 106 mmol/L      CO2 27 mmol/L      ANION GAP 9 mmol/L      BUN 16 mg/dL      Creatinine 1.12 mg/dL      Glucose 110 mg/dL      Calcium 10.1 mg/dL      eGFR 59 ml/min/1.73sq m     Narrative:      National Kid[employer] Foundation guidelines for Chronic Kidney Disease (CKD):     Stage 1 with normal or high GFR (GFR > 90 mL/min/1.73 square meters)    Stage 2 Mild CKD (GFR = 60-89 mL/min/1.73 square meters)    Stage 3A Moderate CKD (GFR = 45-59 mL/min/1.73 square meters)    Stage 3B Moderate CKD (GFR = 30-44 mL/min/1.73 square meters)    Stage 4 Severe CKD (GFR = 15-29 mL/min/1.73 square meters)    Stage 5 End Stage CKD (GFR <15 mL/min/1.73 square meters)  Note: GFR calculation is accurate only with a steady state creatinine    UA w Reflex to Microscopic w Reflex to Culture [[id number]]  (Abnormal) Collected: 07/27/24 1600    Lab Status: Final result Specimen: Urine, Clean  Catch Updated: 07/27/24 1617     Color, UA Dark Red     Clarity, UA Turbid     Specific Gravity, UA 1.020     pH, UA 6.5     Leukocytes, UA Small     Nitrite, UA Negative     Protein,  (2+) mg/dl      Glucose, UA Negative mg/dl      Ketones, UA Negative mg/dl      Urobilinogen, UA <2.0 mg/dl      Bilirubin, UA Negative     Occult Blood, UA Large    CBC and differential [[id number]] Collected: 07/27/24 1601    Lab Status: Final result Specimen: Blood from Arm, Right Updated: 07/27/24 1607     WBC 8.16 Thousand/uL      RBC 4.86 Million/uL      Hemoglobin 14.9 g/dL      Hematocrit 46.2 %      MCV 95 fL      MCH 30.7 pg      MCHC 32.3 g/dL      RDW 13.7 %      MPV 9.3 fL      Platelets 325 Thousands/uL      nRBC 0 /100 WBCs      Segmented % 51 %      Immature Grans % 0 %      Lymphocytes % 35 %      Monocytes % 12 %      Eosinophils Relative 1 %      Basophils Relative 1 %      Absolute Neutrophils 4.21 Thousands/µL      Absolute Immature Grans 0.03 Thousand/uL      Absolute Lymphocytes 2.82 Thousands/µL      Absolute Monocytes 0.98 Thousand/µL      Eosinophils Absolute 0.07 Thousand/µL      Basophils Absolute 0.05 Thousands/µL                    No orders to display              Procedures  Procedures         ED Course                                               Medical Decision Making  Differential diagnoses include but are not limited to, urinary tract infection, mucosal bleeding from Eliquis coagulopathy, malignancy, urinary retention, pyelonephritis,    Patient is an 84-year-old male who presents with blood in his urine that started early this morning.  He has not had any urinary retention and bladder scan here confirms no urinary retention.  He has passed some small clots but no large ones.  He does not have to strain to pass urine.  He has had urinary frequency.  He has a history of prostatic hyperplasia.  He did not have any infectious symptoms but due to the blood in his urine and the frequency I did get  a urinalysis which shows innumerable reds and whites, leukocyte esterase, and bacteria.  His renal function and blood counts were at baseline.  His PTT was therapeutic from his Eliquis.  He did not have any CVA or flank pain, leukocytosis, or fevers that would be consistent with pyelonephritis or ureterolithiasis, so no CT imaging was performed.  While here the bleeding stopped and his urine cleared.  He was given a prescription for antibiotics for his urinary tract infection was told to follow-up with his urologist and primary care doctor.  He was given very strict instructions to return here for any difficulty passing urine or large blood clots.  He also should return for any infectious symptoms such as the shaking chills, high fevers, nausea, or inability to tolerate his antibiotics.  He understands all the instructions and feels comfortable discharge home.    Amount and/or Complexity of Data Reviewed  Labs: ordered.    Risk  Prescription drug management.                 Disposition  Final diagnoses:   Hematuria   UTI (urinary tract infection)     Time reflects when diagnosis was documented in both MDM as applicable and the Disposition within this note       Time User Action Codes Description Comment    7/27/2024  5:19 PM Airam Freeman [R31.9] Hematuria     7/27/2024  5:20 PM Airam Freeman [N39.0] UTI (urinary tract infection)           ED Disposition       ED Disposition   Discharge    Condition   Stable    Date/Time   Sat Jul 27, 2024  5:19 PM    Comment   Curtis Kong discharge to home/self care.                   Follow-up Information       Follow up With Specialties Details Why Contact Info    Moshe Ng MD Family Medicine Schedule an appointment as soon as possible for a visit in 1 week  [address] [place] [id number] [phone]              Discharge Medication List as of 7/27/2024  5:24 PM        START taking these medications    Details   nitrofurantoin (MACROBID) 100  mg capsule Take 1 capsule (100 mg total) by mouth 2 (two) times a day for 7 days, Starting Sat 7/27/2024, Until Sat 8/3/2024, Normal           CONTINUE these medications which have NOT CHANGED    Details   apixaban (Eliquis) 5 mg Take 1 tablet (5 mg total) by mouth 2 (two) times a day, Starting Thu 12/7/2023, Normal      diltiazem (CARDIZEM CD) 240 mg 24 hr capsule Take 1 capsule (240 mg total) by mouth daily, Starting Wed 7/10/2024, Normal      flecainide (TAMBOCOR) 100 mg tablet Take 1 tablet (100 mg total) by mouth 2 (two) times a day, Starting Wed 7/10/2024, Normal      FLUoxetine (PROzac) 10 mg capsule Take 1 capsule (10 mg total) by mouth daily, Starting Fri 11/24/2023, Normal      glucosamine-chondroitin 500-400 MG tablet Take 1 tablet by mouth 2 (two) times a day., Historical Med      lactobacillus acidophilus one capsule daily, Historical Med      multivitamin-iron-minerals-folic acid (CENTRUM) chewable tablet Chew 1 tablet daily., Historical Med      Probiotic Product (PROBIOTIC-10 PO) Take 10 mg by mouth daily, Historical Med      psyllium (METAMUCIL) 58.6 % powder Take 1 packet by mouth 3 (three) times a day, Historical Med      Saw [place] 450 MG CAPS Take 450 mg by mouth daily, Historical Med             No discharge procedures on file.    PDMP Review       None            ED Provider  Electronically Signed by             Airam Freeman MD  07/27/24 1264

## 2024-07-28 LAB — BACTERIA UR CULT: NORMAL

## 2024-07-29 ENCOUNTER — VBI (OUTPATIENT)
Dept: FAMILY MEDICINE CLINIC | Facility: CLINIC | Age: 84
End: 2024-07-29

## 2024-07-29 NOTE — TELEPHONE ENCOUNTER
07/29/24 1:04 PM    Patient contacted post ED visit, [facility] department spoke with patient/caregiver and outreach was successful.    Thank you.  NOEL KIM  [facility]

## 2024-07-29 NOTE — TELEPHONE ENCOUNTER
Message left-   But this is  Dilan returning your call this morning. You called and I'm returning your call. What's the phone number? [phone], That's my number. That's their number. I don't want their number, I want their phone number. I'm on, I'm on talk. They're here. This is my number. That number wasn't my number. That's your number. We're a little confused here [phone]. Sorry about all that confusion. Give me a call when you get a chance. Thank you. Koby.

## 2024-07-29 NOTE — TELEPHONE ENCOUNTER
07/29/24 8:58 AM    Patient contacted post ED visit, first outreach attempt made. Message was left for patient to return a call to the [facility] Department at [facility]: Phone [phone].    Thank you.  NOEL KIM  [facility]

## 2024-08-07 ENCOUNTER — TELEPHONE (OUTPATIENT)
Dept: FAMILY MEDICINE CLINIC | Facility: CLINIC | Age: 84
End: 2024-08-07

## 2024-08-08 ENCOUNTER — OFFICE VISIT (OUTPATIENT)
Dept: FAMILY MEDICINE CLINIC | Facility: CLINIC | Age: 84
End: 2024-08-08
Payer: COMMERCIAL

## 2024-08-08 VITALS — SYSTOLIC BLOOD PRESSURE: 138 MMHG | DIASTOLIC BLOOD PRESSURE: 78 MMHG | OXYGEN SATURATION: 97 % | HEART RATE: 70 BPM

## 2024-08-08 DIAGNOSIS — R31.9 HEMATURIA OF UNDIAGNOSED CAUSE: Primary | ICD-10-CM

## 2024-08-08 DIAGNOSIS — N40.0 PROSTATE HYPERPLASIA, BENIGN LOCALIZED, WITHOUT URINARY OBSTRUCTION: ICD-10-CM

## 2024-08-08 DIAGNOSIS — F33.41 RECURRENT MAJOR DEPRESSIVE DISORDER, IN PARTIAL REMISSION (HCC): ICD-10-CM

## 2024-08-08 DIAGNOSIS — K21.00 GASTROESOPHAGEAL REFLUX DISEASE WITH ESOPHAGITIS, UNSPECIFIED WHETHER HEMORRHAGE: ICD-10-CM

## 2024-08-08 DIAGNOSIS — D03.39 MELANOMA IN SITU OF NOSE (HCC): ICD-10-CM

## 2024-08-08 PROCEDURE — 99214 OFFICE O/P EST MOD 30 MIN: CPT | Performed by: FAMILY MEDICINE

## 2024-08-08 PROCEDURE — G2211 COMPLEX E/M VISIT ADD ON: HCPCS | Performed by: FAMILY MEDICINE

## 2024-08-08 RX ORDER — SULFAMETHOXAZOLE AND TRIMETHOPRIM 800; 160 MG/1; MG/1
TABLET ORAL
Qty: 20 TABLET | Refills: 1 | Status: SHIPPED | OUTPATIENT
Start: 2024-08-08 | End: 2024-08-18

## 2024-08-08 RX ORDER — FAMOTIDINE 20 MG/1
20 TABLET, FILM COATED ORAL
Qty: 90 TABLET | Refills: 3 | Status: SHIPPED | OUTPATIENT
Start: 2024-08-08 | End: 2025-08-03

## 2024-08-08 RX ORDER — OXYBUTYNIN CHLORIDE 10 MG/1
10 TABLET, EXTENDED RELEASE ORAL DAILY
COMMUNITY
Start: 2024-08-02

## 2024-08-08 NOTE — PROGRESS NOTES
Assessment/Plan:    No problem-specific Assessment & Plan notes found for this encounter.       Diagnoses and all orders for this visit:    Hematuria of undiagnosed cause    Prostate hyperplasia, benign localized, without urinary obstruction    Gastroesophageal reflux disease with esophagitis, unspecified whether hemorrhage  -     famotidine (PEPCID) 20 mg tablet; Take 1 tablet (20 mg total) by mouth daily at bedtime    Recurrent major depressive disorder, in partial remission (HCC)    Melanoma in situ of nose (HCC)    Other orders  -     oxybutynin (DITROPAN-XL) 10 MG 24 hr tablet; Take 10 mg by mouth daily          Subjective:   Chief Complaint   Patient presents with    Follow-up     From ER 7/27/24 @ [facility] -- Hematuria -- saw Dr. Barraza on 8/2/24  PHQ9 = 1        Patient ID: Curtis Kong is a 84 y.o. male.    HPI    The following portions of the patient's history were reviewed and updated as appropriate: allergies, current medications, past family history, past medical history, past social history, past surgical history and problem list.    Review of Systems   Constitutional:  Negative for fatigue, fever and unexpected weight change.   HENT:  Negative for congestion, sinus pain and sore throat.    Eyes:  Negative for visual disturbance.   Respiratory:  Negative for shortness of breath and wheezing.    Cardiovascular:  Negative for chest pain and palpitations.   Gastrointestinal:  Negative for abdominal pain, nausea and vomiting.   Musculoskeletal: Negative.  Negative for arthralgias and myalgias.   Neurological:  Negative for syncope, weakness and numbness.   Psychiatric/Behavioral: Negative.  Negative for confusion, dysphoric mood and suicidal ideas.          Objective:  Vitals:    08/08/24 1225   BP: 138/78   Pulse: 70   SpO2: 97%      Physical Exam  Constitutional:       Appearance: He is well-developed.   HENT:      Right Ear: Ear canal normal. Tympanic membrane is not injected.      Left Ear: Ear canal  Patient reports positive fetal movement, denies pain, contractions, leaking of fluid, or bleeding. Patient denies headache, visual changes, nausea/vomiting, epigastric pain related to preeclampsia. SBAR given to ADRIANNE KIMBALL, see their note in Epic.         normal. Tympanic membrane is not injected.      Nose: Nose normal.   Eyes:      General:         Right eye: No discharge.         Left eye: No discharge.      Conjunctiva/sclera: Conjunctivae normal.      Pupils: Pupils are equal, round, and reactive to light.   Neck:      Thyroid: No thyromegaly.   Cardiovascular:      Rate and Rhythm: Normal rate and regular rhythm.      Heart sounds: Normal heart sounds. No murmur heard.  Pulmonary:      Effort: Pulmonary effort is normal. No respiratory distress.      Breath sounds: Normal breath sounds. No wheezing.   Abdominal:      General: Bowel sounds are normal. There is no distension.      Palpations: Abdomen is soft.      Tenderness: There is no abdominal tenderness.   Musculoskeletal:         General: Normal range of motion.      Cervical back: Normal range of motion and neck supple.   Lymphadenopathy:      Cervical: No cervical adenopathy.   Skin:     General: Skin is warm and dry.   Neurological:      Mental Status: He is alert and oriented to person, place, and time. He is not disoriented.      Sensory: No sensory deficit.      Motor: No weakness.      Coordination: Coordination normal.      Gait: Gait normal.      Deep Tendon Reflexes: Reflexes are normal and symmetric.   Psychiatric:         Speech: Speech normal.         Behavior: Behavior normal.         Thought Content: Thought content normal.         Judgment: Judgment normal.

## 2024-08-22 ENCOUNTER — HOSPITAL ENCOUNTER (OUTPATIENT)
Dept: ULTRASOUND IMAGING | Facility: CLINIC | Age: 84
Discharge: HOME | End: 2024-08-22
Payer: COMMERCIAL

## 2024-08-22 DIAGNOSIS — R31.0 GROSS HEMATURIA: ICD-10-CM

## 2024-08-22 PROCEDURE — 76775 US EXAM ABDO BACK WALL LIM: CPT

## 2024-08-24 DIAGNOSIS — I48.19 PERSISTENT ATRIAL FIBRILLATION (HCC): ICD-10-CM

## 2024-08-26 RX ORDER — APIXABAN 5 MG/1
5 TABLET, FILM COATED ORAL 2 TIMES DAILY
Qty: 180 TABLET | Refills: 3 | Status: SHIPPED | OUTPATIENT
Start: 2024-08-26

## 2024-09-14 DIAGNOSIS — F33.41 RECURRENT MAJOR DEPRESSIVE DISORDER, IN PARTIAL REMISSION (HCC): ICD-10-CM

## 2024-09-15 RX ORDER — FLUOXETINE 10 MG/1
10 CAPSULE ORAL DAILY
Qty: 90 CAPSULE | Refills: 3 | Status: SHIPPED | OUTPATIENT
Start: 2024-09-15

## 2025-01-15 ENCOUNTER — OFFICE VISIT (OUTPATIENT)
Dept: CARDIOLOGY CLINIC | Facility: CLINIC | Age: 85
End: 2025-01-15
Payer: COMMERCIAL

## 2025-01-15 VITALS
WEIGHT: 155 LBS | HEIGHT: 70 IN | BODY MASS INDEX: 22.19 KG/M2 | SYSTOLIC BLOOD PRESSURE: 128 MMHG | HEART RATE: 59 BPM | DIASTOLIC BLOOD PRESSURE: 70 MMHG

## 2025-01-15 DIAGNOSIS — N18.31 STAGE 3A CHRONIC KIDNEY DISEASE (HCC): ICD-10-CM

## 2025-01-15 DIAGNOSIS — I48.19 PERSISTENT ATRIAL FIBRILLATION (HCC): Primary | ICD-10-CM

## 2025-01-15 PROCEDURE — 93000 ELECTROCARDIOGRAM COMPLETE: CPT | Performed by: INTERNAL MEDICINE

## 2025-01-15 PROCEDURE — 99214 OFFICE O/P EST MOD 30 MIN: CPT | Performed by: INTERNAL MEDICINE

## 2025-01-15 NOTE — PROGRESS NOTES
Cardiology Follow Up    Curtis Kong  1940  [id number]  [facility] [place]  [address] [facility] [address]  [facility] [address] [phone]    1. Persistent atrial fibrillation (HCC)  POCT ECG      2. Stage 3a chronic kidney disease (HCC)            Interval History: Followup Persistent atrial fibrillation.    Overall doing well. No chest pain, dyspnea or palpitations. No symptomatic PAF.     Medical Problems       Problem List       Paroxysmal atrial fibrillation (HCC)    Basal cell carcinoma of skin    Benign neoplasm of large intestine    Recurrent major depressive disorder, in partial remission (HCC)    Degenerative joint disease    Prostate hyperplasia, benign localized, without urinary obstruction    Hyperlipemia, mixed    Melanoma in situ of nose (HCC)    Overview Signed 12/2/2021 10:44 AM by Elder Majano MD   Remote history of melanoma.  Had excision.  Sees dermatologist yearly for surveillance.  Patient believes it was taken off 5 years ago.         Stage 3a chronic kidney disease (HCC)    Lab Results   Component Value Date    EGFR 59 07/27/2024    EGFR 64 01/29/2024    EGFR 59 (L) 01/10/2023    CREATININE 1.12 07/27/2024    CREATININE 1.12 01/29/2024    CREATININE 1.21 01/10/2023             Past Medical History:   Diagnosis Date    A-fib (HCC)     Atrial fibrillation (HCC)     Basal cell carcinoma     last assessed 10/1/15, resolved 12/15/17    Benign neoplasm of large intestine     last assessed 11/22/16, resolved 12/15/17     Closed displaced fracture of shaft of right clavicle     last assessed 10/20/16, resolved 12/15/17     Diverticulitis of colon     last assessed 3/7/14, resolved 12/15/17     Dysphagia     last assessed 1/20/14, resolved 11/25/15    Hyperlipidemia     Melanoma in situ (HCC)     last assessed 2/15/16, resolved 12/15/17      Social History     Socioeconomic History    Marital status: /Civil Union      Spouse name: Alka    Number of children: 1    Years of education: Not on file    Highest education level: Not on file   Occupational History    Occupation: Retired   Tobacco Use    Smoking status: Never    Smokeless tobacco: Never   Vaping Use    Vaping status: Never Used   Substance and Sexual Activity    Alcohol use: No    Drug use: No    Sexual activity: Not on file   Other Topics Concern    Not on file   Social History Narrative    Not on file     Social Drivers of Health     Financial Resource Strain: Low Risk  (2/7/2024)    Overall Financial Resource Strain (CARDIA)     Difficulty of Paying Living Expenses: Not hard at all   Food Insecurity: Not on file   Transportation Needs: No Transportation Needs (2/7/2024)    PRAPARE - Transportation     Lack of Transportation (Medical): No     Lack of Transportation (Non-Medical): No   Physical Activity: Not on file   Stress: Not on file   Social Connections: Not on file   Intimate Partner Violence: Not on file   Housing Stability: Not on file      Family History   Problem Relation Age of Onset    No Known Problems Mother     Substance Abuse Neg Hx     Mental illness Neg Hx      Past Surgical History:   Procedure Laterality Date    ABSCESS DRAINAGE      Retroperitoneal abscess    ANASTOMOSIS  12/11/2013    Sigmoid colon    COLONOSCOPY      COLOSTOMY  12/11/2013    Temporary    ILEOSTOMY REVISION  02/11/2014    reversal    VASECTOMY         Current Outpatient Medications:     diltiazem (CARDIZEM CD) 240 mg 24 hr capsule, Take 1 capsule (240 mg total) by mouth daily, Disp: 90 capsule, Rfl: 3    Eliquis 5 MG, TAKE 1 TABLET BY MOUTH TWICE  DAILY, Disp: 180 tablet, Rfl: 3    famotidine (PEPCID) 20 mg tablet, Take 1 tablet (20 mg total) by mouth daily at bedtime, Disp: 90 tablet, Rfl: 3    flecainide (TAMBOCOR) 100 mg tablet, Take 1 tablet (100 mg total) by mouth 2 (two) times a day, Disp: 180 tablet, Rfl: 3    FLUoxetine (PROzac) 10 mg capsule, TAKE 1 CAPSULE BY MOUTH  EVERY DAY, Disp: 90 capsule, Rfl: 3    glucosamine-chondroitin 500-400 MG tablet, Take 1 tablet by mouth 2 (two) times a day., Disp: , Rfl:     lactobacillus acidophilus, one capsule daily, Disp: , Rfl:     multivitamin-iron-minerals-folic acid (CENTRUM) chewable tablet, Chew 1 tablet daily., Disp: , Rfl:     oxybutynin (DITROPAN-XL) 10 MG 24 hr tablet, Take 10 mg by mouth daily, Disp: , Rfl:     Probiotic Product (PROBIOTIC-10 PO), Take 10 mg by mouth daily, Disp: , Rfl:     psyllium (METAMUCIL) 58.6 % powder, Take 1 packet by mouth 3 (three) times a day, Disp: , Rfl:     Saw Palmetto 450 MG CAPS, Take 450 mg by mouth daily, Disp: , Rfl:   No Known Allergies    Labs:     Chemistry        Component Value Date/Time     12/08/2017 0953    K 3.6 07/27/2024 1601    K 4.1 01/29/2024 0920     07/27/2024 1601     (H) 01/29/2024 0920    CO2 27 07/27/2024 1601    CO2 27 01/29/2024 0920    BUN 16 07/27/2024 1601    BUN 17 01/29/2024 0920    CREATININE 1.12 07/27/2024 1601    CREATININE 1.19 12/08/2017 0953        Component Value Date/Time    CALCIUM 10.1 07/27/2024 1601    CALCIUM 9.7 12/08/2017 0953    ALKPHOS 110 12/08/2017 0953    AST 18 01/29/2024 0920    ALT 10 01/29/2024 0920    BILITOT 0.6 12/08/2017 0953            Lab Results   Component Value Date    CHOL 216 (H) 12/08/2017    CHOL 230 (H) 11/14/2016    CHOL 208 (H) 09/03/2015     Lab Results   Component Value Date    HDL 58 01/29/2024    HDL 65 01/10/2023    HDL 65 11/22/2021     Lab Results   Component Value Date    LDLCALC 118 (H) 01/29/2024    LDLCALC 144 (H) 01/10/2023    LDLCALC 129 (H) 11/22/2021     Lab Results   Component Value Date    TRIG 149 01/29/2024    TRIG 132 01/10/2023    TRIG 109 11/22/2021     Lab Results   Component Value Date    CHOLHDL 3.6 01/10/2023    CHOLHDL 3.3 05/26/2021    CHOLHDL 3.4 06/18/2019       Imaging: No results found.    EKG: .    Review of Systems   Constitutional: Negative.   HENT: Negative.     Eyes:  Negative.    Cardiovascular: Negative.    Respiratory: Negative.     Endocrine: Negative.    Hematologic/Lymphatic: Negative.    Skin: Negative.    Musculoskeletal: Negative.    Gastrointestinal: Negative.    Genitourinary: Negative.    Neurological: Negative.    Psychiatric/Behavioral: Negative.     Allergic/Immunologic: Negative.        Vitals:    01/15/25 1037   BP: 128/70   Pulse: 59           Physical Exam  Vitals reviewed.   Constitutional:       Appearance: Normal appearance.   HENT:      Head: Normocephalic.      Nose: Nose normal.      Mouth/Throat:      Mouth: Mucous membranes are moist.      Pharynx: Oropharynx is clear.   Eyes:      General: No scleral icterus.     Conjunctiva/sclera: Conjunctivae normal.   Cardiovascular:      Rate and Rhythm: Normal rate and regular rhythm.      Heart sounds: No murmur heard.     No friction rub. No gallop.   Pulmonary:      Effort: Pulmonary effort is normal. No respiratory distress.      Breath sounds: Normal breath sounds. No wheezing or rales.   Abdominal:      General: Abdomen is flat. Bowel sounds are normal. There is no distension.      Palpations: Abdomen is soft.      Tenderness: There is no abdominal tenderness. There is no guarding.   Musculoskeletal:      Cervical back: Normal range of motion and neck supple.      Right lower leg: No edema.      Left lower leg: No edema.   Skin:     General: Skin is warm and dry.   Neurological:      General: No focal deficit present.      Mental Status: He is alert and oriented to person, place, and time.   Psychiatric:         Mood and Affect: Mood normal.         Behavior: Behavior normal.         Discussion/Summary: Persistent Atrial Fibrillation: Maintaining NSR. Overall doing well. EKG today shows NSR with LAFB. No changes.     I have spent a total time of 25 minutes in caring for this patient on the day of the visit/encounter including Diagnostic results, Prognosis, Risks and benefits of tx options, Instructions for  management, and Patient and family education.

## 2025-02-10 ENCOUNTER — OFFICE VISIT (OUTPATIENT)
Dept: FAMILY MEDICINE CLINIC | Facility: CLINIC | Age: 85
End: 2025-02-10
Payer: COMMERCIAL

## 2025-02-10 VITALS
HEART RATE: 81 BPM | SYSTOLIC BLOOD PRESSURE: 114 MMHG | BODY MASS INDEX: 22.19 KG/M2 | DIASTOLIC BLOOD PRESSURE: 72 MMHG | WEIGHT: 155 LBS | TEMPERATURE: 97.1 F | HEIGHT: 70 IN | OXYGEN SATURATION: 97 %

## 2025-02-10 DIAGNOSIS — M54.16 LUMBAR RADICULOPATHY: ICD-10-CM

## 2025-02-10 DIAGNOSIS — Z00.00 MEDICARE ANNUAL WELLNESS VISIT, SUBSEQUENT: Primary | ICD-10-CM

## 2025-02-10 DIAGNOSIS — F33.41 RECURRENT MAJOR DEPRESSIVE DISORDER, IN PARTIAL REMISSION (HCC): ICD-10-CM

## 2025-02-10 DIAGNOSIS — N18.31 STAGE 3A CHRONIC KIDNEY DISEASE (HCC): ICD-10-CM

## 2025-02-10 DIAGNOSIS — E78.2 HYPERLIPEMIA, MIXED: ICD-10-CM

## 2025-02-10 DIAGNOSIS — I48.0 PAROXYSMAL ATRIAL FIBRILLATION (HCC): ICD-10-CM

## 2025-02-10 DIAGNOSIS — D03.39 MELANOMA IN SITU OF NOSE (HCC): ICD-10-CM

## 2025-02-10 DIAGNOSIS — N40.0 PROSTATE HYPERPLASIA, BENIGN LOCALIZED, WITHOUT URINARY OBSTRUCTION: ICD-10-CM

## 2025-02-10 PROCEDURE — G0439 PPPS, SUBSEQ VISIT: HCPCS

## 2025-02-10 PROCEDURE — 99214 OFFICE O/P EST MOD 30 MIN: CPT

## 2025-02-10 RX ORDER — LIDOCAINE 50 MG/G
1 PATCH TOPICAL DAILY
Qty: 30 PATCH | Refills: 1 | Status: SHIPPED | OUTPATIENT
Start: 2025-02-10

## 2025-02-10 RX ORDER — PREDNISONE 20 MG/1
TABLET ORAL
Qty: 15 TABLET | Refills: 0 | Status: SHIPPED | OUTPATIENT
Start: 2025-02-10

## 2025-02-10 NOTE — ASSESSMENT & PLAN NOTE
PHQ-2/9 Depression Screening    Little interest or pleasure in doing things: 0 - not at all  Feeling down, depressed, or hopeless: 0 - not at all  Trouble falling or staying asleep, or sleeping too much: 0 - not at all  Feeling tired or having little energy: 0 - not at all  Poor appetite or overeatin - not at all  Feeling bad about yourself - or that you are a failure or have let yourself or your family down: 0 - not at all  Trouble concentrating on things, such as reading the newspaper or watching television: 0 - not at all  Moving or speaking so slowly that other people could have noticed. Or the opposite - being so fidgety or restless that you have been moving around a lot more than usual: 0 - not at all  Thoughts that you would be better off dead, or of hurting yourself in some way: 0 - not at all  PHQ-9 Score: 0  PHQ-9 Interpretation: No or Minimal depression

## 2025-02-10 NOTE — ASSESSMENT & PLAN NOTE
Follows with cards.     Orders:    CBC and differential; Future    Comprehensive metabolic panel; Future

## 2025-02-10 NOTE — PROGRESS NOTES
Name: Curtis Kong      : 1940      MRN: [id number]  Encounter Provider: AMBREEN Olivarez  Encounter Date: 2/10/2025   Encounter department: [facility]    Assessment & Plan  Lumbar radiculopathy    Orders:    predniSONE 20 mg tablet; TAKE 1 TABLET BID X 5 DAYS THEN TAKE 1 TABLET QD FOR 5 DAYS    lidocaine (Lidoderm) 5 %; Apply 1 patch topically over 12 hours daily Remove & Discard patch within 12 hours or as directed by MD    Melanoma in situ of nose (HCC)  Remote history of melanoma. Had excision. Sees dermatologist yearly for surveillance.         Paroxysmal atrial fibrillation (HCC)  Follows with cards.     Orders:    CBC and differential; Future    Comprehensive metabolic panel; Future    Hyperlipemia, mixed  Trend lipid panel.   Orders:    Comprehensive metabolic panel; Future    Lipid Panel with Direct LDL reflex; Future    Stage 3a chronic kidney disease (HCC)  Lab Results   Component Value Date    EGFR 59 2024    EGFR 64 2024    EGFR 59 (L) 01/10/2023    CREATININE 1.12 2024    CREATININE 1.12 2024    CREATININE 1.21 01/10/2023     GFR stable. Continue to trend CMP.  Orders:    CBC and differential; Future    Comprehensive metabolic panel; Future    Prostate hyperplasia, benign localized, without urinary obstruction  Follows with urology.   Orders:    PSA Total (Reflex To Free); Future    Medicare annual wellness visit, subsequent         Recurrent major depressive disorder, in partial remission (HCC)    PHQ-2/9 Depression Screening    Little interest or pleasure in doing things: 0 - not at all  Feeling down, depressed, or hopeless: 0 - not at all  Trouble falling or staying asleep, or sleeping too much: 0 - not at all  Feeling tired or having little energy: 0 - not at all  Poor appetite or overeatin - not at all  Feeling bad about yourself - or that you are a failure or have let yourself or your family down: 0 - not at  all  Trouble concentrating on things, such as reading the newspaper or watching television: 0 - not at all  Moving or speaking so slowly that other people could have noticed. Or the opposite - being so fidgety or restless that you have been moving around a lot more than usual: 0 - not at all  Thoughts that you would be better off dead, or of hurting yourself in some way: 0 - not at all  PHQ-9 Score: 0  PHQ-9 Interpretation: No or Minimal depression               Depression Screening and Follow-up Plan: Patient was screened for depression during today's encounter. They screened negative with a PHQ-9 score of 0.      Preventive health issues were discussed with patient, and age appropriate screening tests were ordered as noted in patient's After Visit Summary. Personalized health advice and appropriate referrals for health education or preventive services given if needed, as noted in patient's After Visit Summary.    History of Present Illness     Back Pain  This is a new problem. The current episode started 1 to 4 weeks ago. The problem occurs constantly. The problem has been waxing and waning since onset. The pain is present in the lumbar spine. The quality of the pain is described as shooting (sharp). The pain radiates to the right thigh and right knee. The pain is at a severity of 10/10. The symptoms are aggravated by position (walking). Associated symptoms include leg pain and tingling (right buttock and hip). Pertinent negatives include no abdominal pain, bladder incontinence, bowel incontinence, chest pain, dysuria, fever, headaches, numbness, paresis, paresthesias, pelvic pain, perianal numbness, weakness or weight loss. He has tried heat and ice (acetaminophen, biofreeze) for the symptoms. The treatment provided no relief.      Patient Care Team:  Moshe Ng MD as PCP - General (Family Medicine)  MD Alcides Dey MD Paul Marion, MD Jerome Burke, MD Francis Burt, MD    Review of  Systems   Constitutional:  Negative for activity change, fever and weight loss.   HENT:  Negative for congestion, ear pain, rhinorrhea and sore throat.    Eyes:  Negative for pain.   Respiratory:  Negative for cough, shortness of breath and wheezing.    Cardiovascular:  Negative for chest pain and leg swelling.   Gastrointestinal:  Negative for abdominal pain, bowel incontinence, diarrhea, nausea and vomiting.   Genitourinary:  Negative for bladder incontinence, dysuria and pelvic pain.   Musculoskeletal:  Positive for back pain. Negative for arthralgias and myalgias.   Skin:  Negative for rash.   Neurological:  Positive for tingling (right buttock and hip). Negative for dizziness, weakness, numbness, headaches and paresthesias.   All other systems reviewed and are negative.    Medical History Reviewed by provider this encounter:  Tobacco  Allergies  Meds  Problems  Med Hx  Surg Hx  Fam Hx       Annual Wellness Visit Questionnaire    is here for his Subsequent Wellness visit. Last Medicare Wellness visit information reviewed, patient interviewed, no change since last AWV.     Health Risk Assessment:   Patient rates overall health as good. Patient is satisfied with their life. Eyesight was rated as same. Hearing was rated as same. Patient feels that their emotional and mental health rating is same. Patients states they are never, rarely angry. Patient states they are never, rarely unusually tired/fatigued. Pain experienced in the last 7 days has been a lot. Patient's pain rating has been 10/10. Patient states that he has experienced no weight loss or gain in last 6 months.     Depression Screening:   PHQ-9 Score: 0      Fall Risk Screening:   In the past year, patient has experienced: no history of falling in past year      Home Safety:  Patient does not have trouble with stairs inside or outside of their home. Patient has working smoke alarms and has working carbon monoxide detector. Home safety hazards  include: none.     Nutrition:   Current diet is Regular.     Medications:   Patient is currently taking over-the-counter supplements. OTC medications include: see medication list. Patient is able to manage medications.     Activities of Daily Living (ADLs)/Instrumental Activities of Daily Living (IADLs):   Walk and transfer into and out of bed and chair?: Yes  Dress and groom yourself?: Yes    Bathe or shower yourself?: Yes    Feed yourself? Yes  Do your laundry/housekeeping?: Yes  Manage your money, pay your bills and track your expenses?: Yes  Make your own meals?: Yes    Do your own shopping?: Yes    Previous Hospitalizations:   Any hospitalizations or ED visits within the last 12 months?: No      Advance Care Planning:   Living will: Yes    Durable POA for healthcare: Yes    Advanced directive: Yes    ACP document given: Yes    End of Life Decisions reviewed with patient: Yes    Provider agrees with end of life decisions: Yes      Cognitive Screening:   Provider or family/friend/caregiver concerned regarding cognition?: No    PREVENTIVE SCREENINGS      Cardiovascular Screening:    General: Screening Not Indicated and History Lipid Disorder      Diabetes Screening:     General: Screening Current      Colorectal Cancer Screening:     General: Screening Not Indicated      Prostate Cancer Screening:    General: Screening Not Indicated      Lung Cancer Screening:     General: Screening Not Indicated    Screening, Brief Intervention, and Referral to Treatment (SBIRT)    Screening  Typical number of drinks in a day: 0  Typical number of drinks in a week: 0  Interpretation: Low risk drinking behavior.    AUDIT-C Screenin) How often did you have a drink containing alcohol in the past year? never  2) How many drinks did you have on a typical day when you were drinking in the past year? 0  3) How often did you have 6 or more drinks on one occasion in the past year? never    AUDIT-C Score: 0  Interpretation: Score 0-3  "(male): Negative screen for alcohol misuse    Single Item Drug Screening:  How often have you used an illegal drug (including marijuana) or a prescription medication for non-medical reasons in the past year? never    Single Item Drug Screen Score: 0  Interpretation: Negative screen for possible drug use disorder    Social Drivers of Health     Financial Resource Strain: Low Risk  (2/7/2024)    Overall Financial Resource Strain (CARDIA)     Difficulty of Paying Living Expenses: Not hard at all   Food Insecurity: No Food Insecurity (2/10/2025)    Hunger Vital Sign     Worried About Running Out of Food in the Last Year: Never true     Ran Out of Food in the Last Year: Never true   Transportation Needs: No Transportation Needs (2/10/2025)    PRAPARE - Transportation     Lack of Transportation (Medical): No     Lack of Transportation (Non-Medical): No   Housing Stability: Low Risk  (2/10/2025)    Housing Stability Vital Sign     Unable to Pay for Housing in the Last Year: No     Number of Times Moved in the Last Year: 0     Homeless in the Last Year: No   Utilities: Not At Risk (2/10/2025)    [facility] Utilities     Threatened with loss of utilities: No     No results found.    Objective   /72   Pulse 81   Temp (!) 97.1 °F (36.2 °C) (Tympanic)   Ht 5' 10\" (1.778 m)   Wt 70.3 kg (155 lb)   SpO2 97%   BMI 22.24 kg/m²     Physical Exam  Vitals and nursing note reviewed.   Constitutional:       General: He is not in acute distress.     Appearance: Normal appearance. He is well-developed.   HENT:      Head: Normocephalic and atraumatic.      Right Ear: Tympanic membrane, ear canal and external ear normal. There is no impacted cerumen.      Left Ear: Tympanic membrane, ear canal and external ear normal. There is no impacted cerumen.      Nose: Nose normal. No congestion or rhinorrhea.      Mouth/Throat:      Mouth: Mucous membranes are moist.      Pharynx: No oropharyngeal exudate or posterior oropharyngeal erythema. "   Cardiovascular:      Rate and Rhythm: Normal rate and regular rhythm.      Heart sounds: Normal heart sounds. No murmur heard.  Pulmonary:      Effort: Pulmonary effort is normal. No respiratory distress.      Breath sounds: Normal breath sounds. No wheezing.   Abdominal:      General: Bowel sounds are normal. There is no distension.      Palpations: Abdomen is soft.      Tenderness: There is no abdominal tenderness.   Musculoskeletal:      Cervical back: Neck supple.      Lumbar back: Spasms and tenderness present. Positive right straight leg raise test.   Skin:     General: Skin is warm and dry.      Capillary Refill: Capillary refill takes less than 2 seconds.   Neurological:      Mental Status: He is alert and oriented to person, place, and time. Mental status is at baseline.   Psychiatric:         Mood and Affect: Mood normal.         Behavior: Behavior normal.       Administrative Statements   I have spent a total time of 30 minutes in caring for this patient on the day of the visit/encounter including Risks and benefits of tx options, Instructions for management, Patient and family education, Importance of tx compliance, Risk factor reductions, Impressions, Documenting in the medical record, Reviewing / ordering tests, medicine, procedures  , and Obtaining or reviewing history  .

## 2025-02-10 NOTE — ASSESSMENT & PLAN NOTE
Lab Results   Component Value Date    EGFR 59 07/27/2024    EGFR 64 01/29/2024    EGFR 59 (L) 01/10/2023    CREATININE 1.12 07/27/2024    CREATININE 1.12 01/29/2024    CREATININE 1.21 01/10/2023     GFR stable. Continue to trend CMP.  Orders:    CBC and differential; Future    Comprehensive metabolic panel; Future

## 2025-02-10 NOTE — ASSESSMENT & PLAN NOTE
Trend lipid panel.   Orders:    Comprehensive metabolic panel; Future    Lipid Panel with Direct LDL reflex; Future

## 2025-02-10 NOTE — PATIENT INSTRUCTIONS
Take prednisone for inflammation.  Apply lidocaine patches for topical pain relief.  Can take acetaminophen with these other meds for pain relief.  Perform exercises provided on handout.  Let us know if you change your mind about following up with PT. We can place an order if you think PT would be helpful.   Return if symptoms fail to improve or worsen.       Medicare Preventive Visit Patient Instructions  Thank you for completing your Welcome to Medicare Visit or Medicare Annual Wellness Visit today. Your next wellness visit will be due in one year (2/11/2026).  The screening/preventive services that you may require over the next 5-10 years are detailed below. Some tests may not apply to you based off risk factors and/or age. Screening tests ordered at today's visit but not completed yet may show as past due. Also, please note that scanned in results may not display below.  Preventive Screenings:  Service Recommendations Previous Testing/Comments   Colorectal Cancer Screening  Colonoscopy    Fecal Occult Blood Test (FOBT)/Fecal Immunochemical Test (FIT)  Fecal DNA/Cologuard Test  Flexible Sigmoidoscopy Age: 45-75 years old   Colonoscopy: every 10 years (May be performed more frequently if at higher risk)  OR  FOBT/FIT: every 1 year  OR  Cologuard: every 3 years  OR  Sigmoidoscopy: every 5 years  Screening may be recommended earlier than age 45 if at higher risk for colorectal cancer. Also, an individualized decision between you and your healthcare provider will decide whether screening between the ages of 76-85 would be appropriate. Colonoscopy: 10/03/2013  FOBT/FIT: Not on file  Cologuard: Not on file  Sigmoidoscopy: Not on file    Screening Not Indicated     Prostate Cancer Screening Individualized decision between patient and health care provider in men between ages of 55-69   Medicare will cover every 12 months beginning on the day after your 50th birthday PSA: No results in last 5 years     Screening Not  Indicated     Hepatitis C Screening Once for adults born between 1945 and 1965  More frequently in patients at high risk for Hepatitis C Hep C Antibody: Not on file        Diabetes Screening 1-2 times per year if you're at risk for diabetes or have pre-diabetes Fasting glucose: No results in last 5 years (No results in last 5 years)  A1C: No results in last 5 years (No results in last 5 years)  Screening Current   Cholesterol Screening Once every 5 years if you don't have a lipid disorder. May order more often based on risk factors. Lipid panel: 01/29/2024  Screening Not Indicated  History Lipid Disorder      Other Preventive Screenings Covered by Medicare:  Abdominal Aortic Aneurysm (AAA) Screening: covered once if your at risk. You're considered to be at risk if you have a family history of AAA or a male between the age of 65-75 who smoking at least 100 cigarettes in your lifetime.  Lung Cancer Screening: covers low dose CT scan once per year if you meet all of the following conditions: (1) Age 55-77; (2) No signs or symptoms of lung cancer; (3) Current smoker or have quit smoking within the last 15 years; (4) You have a tobacco smoking history of at least 20 pack years (packs per day x number of years you smoked); (5) You get a written order from a healthcare provider.  Glaucoma Screening: covered annually if you're considered high risk: (1) You have diabetes OR (2) Family history of glaucoma OR (3)  aged 50 and older OR (4)  American aged 65 and older  Osteoporosis Screening: covered every 2 years if you meet one of the following conditions: (1) Have a vertebral abnormality; (2) On glucocorticoid therapy for more than 3 months; (3) Have primary hyperparathyroidism; (4) On osteoporosis medications and need to assess response to drug therapy.  HIV Screening: covered annually if you're between the age of 15-65. Also covered annually if you are younger than 15 and older than 65 with risk  factors for HIV infection. For pregnant patients, it is covered up to 3 times per pregnancy.    Immunizations:  Immunization Recommendations   Influenza Vaccine Annual influenza vaccination during flu season is recommended for all persons aged >= 6 months who do not have contraindications   Pneumococcal Vaccine   * Pneumococcal conjugate vaccine = PCV13 (Prevnar 13), PCV15 (Vaxneuvance), PCV20 (Prevnar 20)  * Pneumococcal polysaccharide vaccine = PPSV23 (Pneumovax) Adults 19-63 yo with certain risk factors or if 65+ yo  If never received any pneumonia vaccine: recommend Prevnar 20 (PCV20)  Give PCV20 if previously received 1 dose of PCV13 or PPSV23   Hepatitis B Vaccine 3 dose series if at intermediate or high risk (ex: diabetes, end stage renal disease, liver disease)   Respiratory syncytial virus (RSV) Vaccine - COVERED BY MEDICARE PART D  * RSVPreF3 (Arexvy) CDC recommends that adults 60 years of age and older may receive a single dose of RSV vaccine using shared clinical decision-making (SCDM)   Tetanus (Td) Vaccine - COST NOT COVERED BY MEDICARE PART B Following completion of primary series, a booster dose should be given every 10 years to maintain immunity against tetanus. Td may also be given as tetanus wound prophylaxis.   Tdap Vaccine - COST NOT COVERED BY MEDICARE PART B Recommended at least once for all adults. For pregnant patients, recommended with each pregnancy.   Shingles Vaccine (Shingrix) - COST NOT COVERED BY MEDICARE PART B  2 shot series recommended in those 19 years and older who have or will have weakened immune systems or those 50 years and older     Health Maintenance Due:  There are no preventive care reminders to display for this patient.  Immunizations Due:      Topic Date Due    COVID-19 Vaccine (8 - 2024-25 season) 10/14/2024     Advance Directives   What are advance directives?  Advance directives are legal documents that state your wishes and plans for medical care. These plans are  made ahead of time in case you lose your ability to make decisions for yourself. Advance directives can apply to any medical decision, such as the treatments you want, and if you want to donate organs.   What are the types of advance directives?  There are many types of advance directives, and each state has rules about how to use them. You may choose a combination of any of the following:  Living will:  This is a written record of the treatment you want. You can also choose which treatments you do not want, which to limit, and which to stop at a certain time. This includes surgery, medicine, IV fluid, and tube feedings.   Durable power of  for healthcare (DPAHC):  This is a written record that states who you want to make healthcare choices for you when you are unable to make them for yourself. This person, called a proxy, is usually a family member or a friend. You may choose more than 1 proxy.  Do not resuscitate (DNR) order:  A DNR order is used in case your heart stops beating or you stop breathing. It is a request not to have certain forms of treatment, such as CPR. A DNR order may be included in other types of advance directives.  Medical directive:  This covers the care that you want if you are in a coma, near death, or unable to make decisions for yourself. You can list the treatments you want for each condition. Treatment may include pain medicine, surgery, blood transfusions, dialysis, IV or tube feedings, and a ventilator (breathing machine).  Values history:  This document has questions about your views, beliefs, and how you feel and think about life. This information can help others choose the care that you would choose.  Why are advance directives important?  An advance directive helps you control your care. Although spoken wishes may be used, it is better to have your wishes written down. Spoken wishes can be misunderstood, or not followed. Treatments may be given even if you do not want them.  An advance directive may make it easier for your family to make difficult choices about your care.       © Copyright [employer] 2018 Information is for End User's use only and may not be sold, redistributed or otherwise used for commercial purposes. All illustrations and images included in CareNotes® are the copyrighted property of A.D.A[employer]M., Inc[employer] or [employer]

## 2025-02-11 ENCOUNTER — TELEPHONE (OUTPATIENT)
Age: 85
End: 2025-02-11

## 2025-02-11 NOTE — TELEPHONE ENCOUNTER
PA for Lidocaine (Lidoderm) 5 % DENIED    Reason:(Screenshot if applicable)        Message sent to office clinical pool Yes    Denial letter scanned into Media Yes    Appeal started No (Provider will need to decide if appeal is warranted and send clinical documentation to Prior Authorization Team for initiation.)    **Please follow up with your patient regarding denial and next steps**

## 2025-02-11 NOTE — TELEPHONE ENCOUNTER
Sweta from [facility] dep called to notify denial on lidocaine patches     Ref #[id number]

## 2025-02-11 NOTE — TELEPHONE ENCOUNTER
PA for Lidocaine (Lidoderm) 5 % SUBMITTED to [employer]    via    []CMM-KEY:   [x]Surescripts-Case ID #: [id number]   []Availity-Auth ID #   []Faxed to plan  []Other website   []Phone call Case ID #     [x]PA sent as URGENT    All office notes, labs and other pertaining documents and studies sent. Clinical questions answered. Awaiting determination from insurance company.     Turnaround time for your insurance to make a decision on your Prior Authorization can take 7-21 business days.

## 2025-03-03 ENCOUNTER — HOSPITAL ENCOUNTER (OUTPATIENT)
Dept: RADIOLOGY | Facility: HOSPITAL | Age: 85
Discharge: HOME | End: 2025-03-03
Payer: COMMERCIAL

## 2025-03-03 ENCOUNTER — OFFICE VISIT (OUTPATIENT)
Dept: FAMILY MEDICINE CLINIC | Facility: CLINIC | Age: 85
End: 2025-03-03
Payer: COMMERCIAL

## 2025-03-03 VITALS
BODY MASS INDEX: 19.08 KG/M2 | SYSTOLIC BLOOD PRESSURE: 120 MMHG | DIASTOLIC BLOOD PRESSURE: 70 MMHG | WEIGHT: 133 LBS | OXYGEN SATURATION: 98 % | HEART RATE: 70 BPM

## 2025-03-03 DIAGNOSIS — M54.16 LUMBAR RADICULOPATHY: ICD-10-CM

## 2025-03-03 DIAGNOSIS — N40.0 PROSTATE HYPERPLASIA, BENIGN LOCALIZED, WITHOUT URINARY OBSTRUCTION: ICD-10-CM

## 2025-03-03 DIAGNOSIS — M54.16 LUMBAR RADICULOPATHY: Primary | ICD-10-CM

## 2025-03-03 PROCEDURE — 72110 X-RAY EXAM L-2 SPINE 4/>VWS: CPT

## 2025-03-03 PROCEDURE — 99214 OFFICE O/P EST MOD 30 MIN: CPT

## 2025-03-03 RX ORDER — GABAPENTIN 100 MG/1
100 CAPSULE ORAL
Qty: 30 CAPSULE | Refills: 5 | Status: SHIPPED | OUTPATIENT
Start: 2025-03-03 | End: 2025-03-06 | Stop reason: SDUPTHER

## 2025-03-03 NOTE — ASSESSMENT & PLAN NOTE
Reports frequency, urgency, nocturia, weak stream, dribbling. Patient unable to provide urine specimen to r/o UTI. Follows with [facility]. Tamsulosin and finasteride ineffective in the past.

## 2025-03-03 NOTE — PROGRESS NOTES
Name: Curtis Kong      : 1940      MRN: [id number]  Encounter Provider: AMBREEN Olivarez  Encounter Date: 3/3/2025   Encounter department: [facility]  :  Assessment & Plan  Lumbar radiculopathy  Patient reports not relief with rest, heat, ice, acetaminophen, biofreeze, salonpas, prednisone. Discussed risk vs benefit of skeletal muscle relaxants and/or opioid pain medications. Patient declines at this time. Insurance would not cover lidoderm patches. Cannot take ibuprofen as takes eliquis. Willing to try gabapentin at night. XR lumbar spine and ref comprehensive [facility]. Return in 2-3 weeks for follow up.   Orders:    XR spine lumbar minimum 4 views non injury; Future    gabapentin (NEURONTIN) 100 mg capsule; Take 1 capsule (100 mg total) by mouth daily at bedtime    Ambulatory Referral to [facility]; Future    Prostate hyperplasia, benign localized, without urinary obstruction  Reports frequency, urgency, nocturia, weak stream, dribbling. Patient unable to provide urine specimen to r/o UTI. Follows with [facility]. Tamsulosin and finasteride ineffective in the past.               History of Present Illness   This is a new problem. The current episode started 1 to 4 weeks ago. The problem occurs constantly. The problem has been waxing and waning since onset. The pain is present in the lumbar spine. The quality of the pain is described as shooting (sharp). The pain radiates to the right thigh and right knee. The pain is at a severity of 10/10. The symptoms are aggravated by position (walking). Associated symptoms include leg pain and tingling (right buttock and hip). Pertinent negatives include no abdominal pain, bladder incontinence, bowel incontinence, chest pain, dysuria, fever, headaches, numbness, paresis, paresthesias, pelvic pain, perianal numbness, weakness or weight loss. He has tried heat and ice (acetaminophen, biofreeze,  prednisone, salonpas) for the symptoms. The treatment provided no relief.       Review of Systems   Constitutional:  Negative for activity change, fatigue and fever.   HENT:  Negative for congestion, ear pain, rhinorrhea and sore throat.    Eyes:  Negative for pain.   Respiratory:  Negative for cough, shortness of breath and wheezing.    Cardiovascular:  Negative for chest pain and leg swelling.   Gastrointestinal:  Negative for abdominal pain, diarrhea, nausea and vomiting.   Genitourinary:  Positive for frequency and urgency. Negative for dysuria and hematuria.   Musculoskeletal:  Positive for back pain. Negative for arthralgias and myalgias.   Skin:  Negative for rash.   Neurological:  Negative for dizziness, weakness and numbness.   Psychiatric/Behavioral:  Negative for dysphoric mood and suicidal ideas. The patient is not hyperactive.    All other systems reviewed and are negative.      Objective   /70 (BP Location: Left arm, Patient Position: Sitting, Cuff Size: Standard)   Pulse 70   Wt 60.3 kg (133 lb)   SpO2 98%   BMI 19.08 kg/m²      Physical Exam  Vitals and nursing note reviewed.   Constitutional:       General: He is not in acute distress.     Appearance: Normal appearance. He is well-developed. He is not ill-appearing.   HENT:      Head: Normocephalic and atraumatic.      Right Ear: External ear normal.      Left Ear: External ear normal.   Cardiovascular:      Rate and Rhythm: Normal rate and regular rhythm.      Heart sounds: Normal heart sounds. No murmur heard.  Pulmonary:      Effort: Pulmonary effort is normal. No respiratory distress.      Breath sounds: Normal breath sounds. No wheezing.   Abdominal:      General: Bowel sounds are normal. There is no distension.      Palpations: Abdomen is soft.      Tenderness: There is no abdominal tenderness.   Musculoskeletal:      Cervical back: Neck supple.      Lumbar back: Spasms and tenderness present. No swelling or bony tenderness.  Decreased range of motion. Positive right straight leg raise test.   Skin:     General: Skin is warm and dry.      Capillary Refill: Capillary refill takes less than 2 seconds.   Neurological:      Mental Status: He is alert and oriented to person, place, and time. Mental status is at baseline.   Psychiatric:         Mood and Affect: Mood normal.         Behavior: Behavior normal.       Administrative Statements   I have spent a total time of 30 minutes in caring for this patient on the day of the visit/encounter including Risks and benefits of tx options, Instructions for management, Patient and family education, Importance of tx compliance, Risk factor reductions, Impressions, Documenting in the medical record, Reviewing/placing orders in the medical record (including tests, medications, and/or procedures), and Obtaining or reviewing history  .

## 2025-03-04 ENCOUNTER — TELEPHONE (OUTPATIENT)
Dept: PHYSICAL THERAPY | Facility: OTHER | Age: 85
End: 2025-03-04

## 2025-03-06 ENCOUNTER — NURSE TRIAGE (OUTPATIENT)
Dept: PHYSICAL THERAPY | Facility: OTHER | Age: 85
End: 2025-03-06

## 2025-03-06 ENCOUNTER — RESULTS FOLLOW-UP (OUTPATIENT)
Dept: FAMILY MEDICINE CLINIC | Facility: CLINIC | Age: 85
End: 2025-03-06

## 2025-03-06 DIAGNOSIS — M54.41 ACUTE RIGHT-SIDED LOW BACK PAIN WITH RIGHT-SIDED SCIATICA: Primary | ICD-10-CM

## 2025-03-06 DIAGNOSIS — M54.16 LUMBAR RADICULOPATHY: ICD-10-CM

## 2025-03-06 RX ORDER — GABAPENTIN 100 MG/1
100 CAPSULE ORAL 2 TIMES DAILY
Qty: 60 CAPSULE | Refills: 5 | Status: SHIPPED | OUTPATIENT
Start: 2025-03-06

## 2025-03-06 NOTE — TELEPHONE ENCOUNTER
Additional Information   Negative: Has the patient had unexplained weight loss?   Negative: Does the patient have a fever?   Negative: Is the patient experiencing blood in sputum?   Negative: Is this a chronic condition?   Negative: Is the patient experiencing urine retention?   Negative: Is the patient experiencing acute drop foot or paralysis?   Negative: Has the patient experienced major trauma? (fall from height, high speed collision, direct blow to spine) and is also experiencing nausea, light-headedness, or loss of consciousness?    Protocols used: [facility] Protocol    [facility] was reviewed in detail and what we can provide for their back pain.  Patient is agreeable to being triaged and would like to proceed with Physical Therapy.    Referral was placed for Physical Therapy at the [place] site. Patients information was sent to the  to make evaluation appointment. Patient made aware that the PT office  will be calling to schedule the appointment.  Patient was provided with the phone number to the PT office.    No further questions and/or concerns were voiced by the patient at this time. Patient states understanding of the referral that was placed.    Referral Closed.

## 2025-03-06 NOTE — TELEPHONE ENCOUNTER
----- Message from AMBREEN Boyd sent at 3/6/2025  8:47 AM EST -----  Patient stated that he did not use Tizrat at his last appt.    Please notify patient:    Lumbar XR showed--    FINDINGS:  No acute fracture. Intact pedicles.  L2 slight spondylolisthesis (disc slip forward). Anatomic alignment otherwise. Lumbar lordosis straightening (curve in lower spine is straightening).  Posterior facets mild degenerative sclerosis (hardening/straightening). L4-L5, L5-S1 marked disc space narrowing.  Unremarkable soft tissues.     IMPRESSION:  Lumbar lordosis straightening.  Degenerative changes.  No acute osseous abnormality.      [facility] attempted to call patient and states that they left a message. Recommend calling them back.

## 2025-03-06 NOTE — TELEPHONE ENCOUNTER
"Patient's daughter Cheri is with him. Please call her number instead [phone]. She is on communication consent.    Additional Information   Negative: Is this related to a work injury?   Negative: Is this related to an MVA?   Negative: Are you currently recieving homecare services?    Background - Initial Assessment  Clinical complaint: Lower back pain but mostly pain in the right hip radiating down into the right thigh and down to the leg. Hx of back pain \"but nothing like this episode\". Pain started 1 month ago, worsen lately. No pain in his upper body. Numbness and tingling sensation \"a little\" in his right hip and thigh. Was seen by FM who ordered Xray lumbar spine and showed: Lumbar lordosis straightening and  Degenerative changes. Not seeing a spine specialist for this pain yet. NKI. Patient states pain is constant, worse with movements. Feel some relief when sitting. Patient described pain as aching, sharp, shooting, dull, burning, throbbing but mostly stabbing. He is taking a muscle relaxer.  Date of onset: 1 month ago ( new flare up)  Frequency of pain: constant  Quality of pain: aching, burning, dull, numb, sharp, shooting, stabbing, throbbing, and tingling.    Protocols used: [facility] Protocol    "

## 2025-03-06 NOTE — TELEPHONE ENCOUNTER
"Additional Information   Negative: Is this related to a work injury?   Negative: Is this related to an MVA?   Negative: Are you currently recieving homecare services?    Background - Initial Assessment  Clinical complaint: Lower back pain but mostly pain in the right hip radiating down into the right thigh and down to the leg. Hx of back pain \"but nothing like this episode\". Pain started 1 month ago, worsen lately. No pain in his upper body. Numbness and tingling sensation \"a little\" in his right hip and thigh. Was seen by FM who ordered Xray lumbar spine and showed: Lumbar lordosis straightening and  Degenerative changes. Not seeing a spine specialist for this pain yet. Patient states pain is constant, worse with movements. Feel some relief when sitting. Patient described pain as aching, sharp, shooting, dull, burning, throbbing but mostly stabbing. He is taking a muscle relaxer.  Date of onset: 1 month ago ( new flare up)  Frequency of pain: constant  Quality of pain: aching, burning, dull, numb, sharp, shooting, stabbing, throbbing, and tingling.    Protocols used: [facility] Protocol    "

## 2025-03-06 NOTE — RESULT ENCOUNTER NOTE
Patient stated that he did not use mychart at his last appt.    Please notify patient:    Lumbar XR showed--    FINDINGS:  No acute fracture. Intact pedicles.  L2 slight spondylolisthesis (disc slip forward). Anatomic alignment otherwise. Lumbar lordosis straightening (curve in lower spine is straightening).  Posterior facets mild degenerative sclerosis (hardening/straightening). L4-L5, L5-S1 marked disc space narrowing.  Unremarkable soft tissues.    IMPRESSION:  Lumbar lordosis straightening.  Degenerative changes.  No acute osseous abnormality.      [facility] attempted to call patient and states that they left a message. Recommend calling them back.

## 2025-03-06 NOTE — TELEPHONE ENCOUNTER
Patient called made aware of results. Inquiring if patient able to take Rx: Gabapentin during day as well for pain.        Please advise.  Thank you

## 2025-03-06 NOTE — TELEPHONE ENCOUNTER
----- Message from AMBREEN Boyd sent at 3/6/2025  2:50 PM EST -----  OK to take morning and night  ----- Message -----  From: Maxx Landon MA  Sent: 3/6/2025   1:40 PM EST  To: AMBREEN Olivarez    Doesn't he already take it for pain during the day?  ----- Message -----  From: Maxx Landon MA  Sent: 3/6/2025   9:23 AM EST  To: [facility]

## 2025-03-27 ENCOUNTER — EVALUATION (OUTPATIENT)
Dept: PHYSICAL THERAPY | Facility: CLINIC | Age: 85
End: 2025-03-27
Payer: COMMERCIAL

## 2025-03-27 VITALS — SYSTOLIC BLOOD PRESSURE: 110 MMHG | DIASTOLIC BLOOD PRESSURE: 70 MMHG | HEART RATE: 83 BPM | OXYGEN SATURATION: 97 %

## 2025-03-27 DIAGNOSIS — G89.29 CHRONIC RIGHT-SIDED LOW BACK PAIN WITH RIGHT-SIDED SCIATICA: Primary | ICD-10-CM

## 2025-03-27 DIAGNOSIS — R26.2 DIFFICULTY WALKING: ICD-10-CM

## 2025-03-27 DIAGNOSIS — M54.41 CHRONIC RIGHT-SIDED LOW BACK PAIN WITH RIGHT-SIDED SCIATICA: Primary | ICD-10-CM

## 2025-03-27 LAB
ALBUMIN SERPL-MCNC: 4.1 G/DL (ref 3.7–4.7)
ALP SERPL-CCNC: 104 IU/L (ref 44–121)
ALT SERPL-CCNC: 16 IU/L (ref 0–44)
AST SERPL-CCNC: 20 IU/L (ref 0–40)
BASOPHILS # BLD AUTO: 0.1 X10E3/UL (ref 0–0.2)
BASOPHILS NFR BLD AUTO: 1 %
BILIRUB SERPL-MCNC: 0.5 MG/DL (ref 0–1.2)
BUN SERPL-MCNC: 16 MG/DL (ref 8–27)
BUN/CREAT SERPL: 14 (ref 10–24)
CALCIUM SERPL-MCNC: 9.8 MG/DL (ref 8.6–10.2)
CHLORIDE SERPL-SCNC: 104 MMOL/L (ref 96–106)
CHOLEST SERPL-MCNC: 221 MG/DL (ref 100–199)
CO2 SERPL-SCNC: 21 MMOL/L (ref 20–29)
CREAT SERPL-MCNC: 1.17 MG/DL (ref 0.76–1.27)
EGFR: 61 ML/MIN/1.73
EOSINOPHIL # BLD AUTO: 0.2 X10E3/UL (ref 0–0.4)
EOSINOPHIL NFR BLD AUTO: 2 %
ERYTHROCYTE [DISTWIDTH] IN BLOOD BY AUTOMATED COUNT: 12.6 % (ref 11.6–15.4)
GLOBULIN SER-MCNC: 2.6 G/DL (ref 1.5–4.5)
GLUCOSE SERPL-MCNC: 87 MG/DL (ref 70–99)
HCT VFR BLD AUTO: 46.3 % (ref 37.5–51)
HDLC SERPL-MCNC: 66 MG/DL
HGB BLD-MCNC: 15.2 G/DL (ref 13–17.7)
IMM GRANULOCYTES # BLD: 0 X10E3/UL (ref 0–0.1)
IMM GRANULOCYTES NFR BLD: 0 %
LDLC SERPL CALC-MCNC: 136 MG/DL (ref 0–99)
LDLC/HDLC SERPL: 2.1 RATIO (ref 0–3.6)
LYMPHOCYTES # BLD AUTO: 3.1 X10E3/UL (ref 0.7–3.1)
LYMPHOCYTES NFR BLD AUTO: 34 %
MCH RBC QN AUTO: 30.7 PG (ref 26.6–33)
MCHC RBC AUTO-ENTMCNC: 32.8 G/DL (ref 31.5–35.7)
MCV RBC AUTO: 94 FL (ref 79–97)
MONOCYTES # BLD AUTO: 1.2 X10E3/UL (ref 0.1–0.9)
MONOCYTES NFR BLD AUTO: 14 %
NEUTROPHILS # BLD AUTO: 4.4 X10E3/UL (ref 1.4–7)
NEUTROPHILS NFR BLD AUTO: 49 %
PLATELET # BLD AUTO: 405 X10E3/UL (ref 150–450)
POTASSIUM SERPL-SCNC: 4.4 MMOL/L (ref 3.5–5.2)
PROT SERPL-MCNC: 6.7 G/DL (ref 6–8.5)
PSA SERPL-MCNC: 16.9 NG/ML (ref 0–4)
RBC # BLD AUTO: 4.95 X10E6/UL (ref 4.14–5.8)
SL AMB REFLEX CRITERIA: ABNORMAL
SL AMB VLDL CHOLESTEROL CALC: 19 MG/DL (ref 5–40)
SODIUM SERPL-SCNC: 142 MMOL/L (ref 134–144)
TRIGL SERPL-MCNC: 106 MG/DL (ref 0–149)
WBC # BLD AUTO: 9.1 X10E3/UL (ref 3.4–10.8)

## 2025-03-27 PROCEDURE — 97010 HOT OR COLD PACKS THERAPY: CPT | Performed by: PHYSICAL THERAPIST

## 2025-03-27 PROCEDURE — 97162 PT EVAL MOD COMPLEX 30 MIN: CPT | Performed by: PHYSICAL THERAPIST

## 2025-03-27 PROCEDURE — 97110 THERAPEUTIC EXERCISES: CPT | Performed by: PHYSICAL THERAPIST

## 2025-03-27 NOTE — PROGRESS NOTES
PT Evaluation     Today's date: 3/28/2025  Patient name: Curtis Kong  : 1940  MRN: [id number]  Referring provider: Ras España PT  Dx:   Encounter Diagnosis     ICD-10-CM    1. Chronic right-sided low back pain with right-sided sciatica  M54.41 Ambulatory referral to PT spine    G89.29       2. Difficulty walking  R26.2           Start Time: 1145  Stop Time: 1235  Total time in clinic (min): 50 minutes      Assessment  Impairments: abnormal gait, abnormal muscle tone, abnormal or restricted ROM, activity intolerance, impaired balance, impaired physical strength, lacks appropriate home exercise program and pain with function  Symptom irritability: moderate    Assessment details: Patient is a 85 y.o. male that presents to therapy via comprehensive spine program. Patient presents with decreased lumbar spine AROM and joint mobility, core muscle weakness, decreased hip mobility, soft tissue restrictions and trigger points, antalgic gait pattern, and poor posture. Patient most closely fits into repeated motion/pain modulation classification of treatment. His impairments limit patient with sleeping, stair climbing, walking even short distances, ADLs, household chores, community participation, and overall quality of life. Patient requires skilled PT to address above mentioned impairments and improve function in home and community. Patient is in agreement with this plan.    Understanding of Dx/Px/POC: good     Prognosis: good    Goals  Short term goals:  Patient is independent in home program to support plan of care and improve function. - 2 weeks  Patient demonstrates at least 75% lumbar extension AROM so he can walk after prolonged sitting with less pain. - 3 weeks  Patient demonstrates proper body mechanics with bending and lifting so she can perform household chores with less pain. - 2 weeks  Patient ambulates with upright posture and no antalgia so he can assess home with less difficulty. - 3  weeks    Long term goals:  Patient demonstrates improvement in community participation with increase in FOTO score by 20%. - 8 weeks  Patient can lie flat in bed without low back or lower extremity pain so he can sleep with less difficulty. - 4 weeks  Patient demonstrates 4+ core strength so he can perform meal prep with less pain. - 8 weeks  Patient demonstrates 100% lumbar side bending, rotation, and flexion AROM so he can perform all ADLs without pain. - 8 weeks    Plan  Patient would benefit from: skilled physical therapy    Planned therapy interventions: abdominal trunk stabilization, activity modification, joint mobilization, manual therapy, massage, neuromuscular re-education, patient education, postural training, strengthening, stretching, body mechanics training, therapeutic exercise, flexibility, functional ROM exercises, home exercise program and IASTM    Frequency: 2x week  Duration in weeks: 8  Plan of Care beginning date: 3/27/2025  Plan of Care expiration date: 5/23/2025  Treatment plan discussed with: patient    Subjective Evaluation    History of Present Illness  Date of onset: 2/5/2025  Mechanism of injury: Patient presents to therapy via comprehensive spine program. Patient reports onset of symptoms about 2 months ago. He has had back pain before, but not like this. He reports that he pulled his back when putting a vacuum  away which lasted 2 weeks and then pain shifted into his leg. Patient was given gabapentin, prednisone pack without relief. Patient was initially referred to pain management but deferred for now.    Symptoms: Right sided back pain, which radiates into thigh, occasionally into calf but most common area is buttock. He also gets pins/needles in buttock area. Pain increases with standing, lying flat. No difficulty with bending, or sitting. Patient has difficulty falling back asleep when he gets up to go to the bathroom. Patient hasn't been lifting groceries because of this,  mostly due to difficulty walking. He has difficulty stair climbing, descends 1 step at a time    Patient denies sleep disturbance, bowel bladder dysfunction, saddle parasthesias, unexplained weight loss    PMH: per medical chart: Afib, h/o basal cell carcinoma, depression, stage 3 kidney disease    Home setup: lives with spouse, adult children, help available     Patient Goals  Patient goals for therapy: decreased pain    Pain  Current pain rating: 3-4  At best pain ratin  At worst pain ratin        Objective     Postural Observations  Seated posture: fair      Palpation       Right   Hypertonic in the lumbar paraspinals, tenderness and trigger point along gluteal muscles     Neurological Testing     Sensation     Lumbar   Left   Intact: light touch    Right   Intact: light touch    Reflexes   Left   Patellar (L4): normal (2+)  Achilles (S1): trace (1+)    Right   Patellar (L4): normal (2+)  Achilles (S1): trace (1+)     Active Range of Motion     Lumbar AROM  Flexion 90%  Extension 25%  R side bending 75%  L side bending 75%  R rotation 75%  L rotation 100% with pain    Repeated motion testing:  Repeated flexion in sitting: no effect  Repeated extension in standing: unable  Further repeated motion testing TBA as needed    Joint Play     Hypomobile: L1, L2, L3, L4 and L5       Strength/Myotome Testing     Left Hip   Planes of Motion   Flexion: 5    Right Hip   Planes of Motion   Flexion: 4+    Left Knee   Flexion: 5  Extension: 5    Right Knee   Flexion: 4+  Extension: 4+    Left Ankle/Foot   Dorsiflexion: 4+  Eversion: 5  Great toe extension: 4+    Right Ankle/Foot   Dorsiflexion: 4+  Eversion: 5  Great toe extension: 4+    Core muscle testing:  Posterior pelvic tilt : TBA  Anterior pelvic tilt : TBA  Transverse abdominus contraction : TBA  Bridge: unable, pain  Abdominal curl up: unable, pain  Labored transfer from sit<>supine  4-/5 core strength      Tests     Lumbar     Left   positive crossed SLR  "test  Negative slump test.     Right   positive passive SLR and slump test.       General Comments:      Hip Comments   R hip PROM screen : mild ER and flexion, severe IR restriction (unable to test hamstrings)  L hip PROM screen : mild ER, hamstrings, and flexion, severe IR restriction            Precautions: Afib. EPOC 5/23/25      Manuals 3/27                         Neutral gap mob gr 1-3 b/l SANDY p!            Manual traction             STM sidelying piriformis and paraspinals             Neuro Re-Ed                          TA brace                          Sciatic nerve glide supine             Seated sciatic nerve glide                                       Ther Ex                          Pt edu on pathology, HEP, POC 8'            Seated lumbar flex 5x10\"            Seated trunk rotation 5x10\"            Seated trunk ext 5x5\"             Piriformis stretch             Figure 4 stretch             Supine trunk rotation             Ther Activity                                       Gait Training                                       Modalities             P 8' sitting, end                              "

## 2025-03-28 ENCOUNTER — RESULTS FOLLOW-UP (OUTPATIENT)
Dept: FAMILY MEDICINE CLINIC | Facility: CLINIC | Age: 85
End: 2025-03-28

## 2025-03-28 NOTE — TELEPHONE ENCOUNTER
LMOM for pt to  for results in red       ----- Message from AMBREEN Boyd sent at 3/28/2025  8:08 AM EDT -----  Labs show elevated cholesterol and LDL. Discussed with Dr. Ng--- can consider a statin medication. This type of drug is usually highly effective to lower LDL cholesterol and is usually very well tolerated. However, statin-type drugs can potentially injure the liver. Blood tests will be required at regular intervals for the duration of therapy.  Damage to the liver, if detected early, can be reversed by stopping the drug.  Be alert for persistent nausea, abdominal pain, or yellow jaundice, and report such to me directly. Statin drugs may also cause skeletal muscle injury in rare cases. Be alert for pronounced persistent diffuse muscle pain and discontinue the drug immediately should such symptoms develop. Grapefruit juice may increase the blood levels and side effects of some statins.    Looks like PSA is higher than it was in 11/23 but reviewing trends--staying within the range he has been at. Recommend follow up with his urologist.     PT reached out to me yesterday regarding patient's pain impeding progress at PT. I would recommend follow up with pain mgmt to discuss his options. I will place a referral--[phone]    Wife also has results that need to be reviewed..

## 2025-03-28 NOTE — RESULT ENCOUNTER NOTE
Labs show elevated cholesterol and LDL. Discussed with Dr. Ng--- can consider a statin medication. This type of drug is usually highly effective to lower LDL cholesterol and is usually very well tolerated. However, statin-type drugs can potentially injure the liver. Blood tests will be required at regular intervals for the duration of therapy.  Damage to the liver, if detected early, can be reversed by stopping the drug.  Be alert for persistent nausea, abdominal pain, or yellow jaundice, and report such to me directly. Statin drugs may also cause skeletal muscle injury in rare cases. Be alert for pronounced persistent diffuse muscle pain and discontinue the drug immediately should such symptoms develop. Grapefruit juice may increase the blood levels and side effects of some statins.    Looks like PSA is higher than it was in 11/23 but reviewing trends--staying within the range he has been at. Recommend follow up with his urologist.     PT reached out to me yesterday regarding patient's pain impeding progress at PT. I would recommend follow up with pain mgmt to discuss his options. I will place a referral--[phone]    Wife also has results that need to be reviewed..

## 2025-03-31 NOTE — TELEPHONE ENCOUNTER
----- Message from Moshe Ng MD sent at 3/29/2025  7:41 AM EDT -----  PSA elev=16----not much differ than prev---does he see urol??  ----- Message -----  From: Shanique Rudd MA  Sent: 3/28/2025  12:01 PM EDT  To: Moshe Ng MD

## 2025-03-31 NOTE — TELEPHONE ENCOUNTER
called wanting to know the results of blood work recently completed, relayed message in red,  still wanted to know what his cholesterol was reading, please reach out to provide further clarification.     also stated that he has an appointment with urology in September.     Please advise.

## 2025-04-01 ENCOUNTER — OFFICE VISIT (OUTPATIENT)
Dept: PHYSICAL THERAPY | Facility: CLINIC | Age: 85
End: 2025-04-01
Payer: COMMERCIAL

## 2025-04-01 DIAGNOSIS — R26.2 DIFFICULTY WALKING: ICD-10-CM

## 2025-04-01 DIAGNOSIS — G89.29 CHRONIC RIGHT-SIDED LOW BACK PAIN WITH RIGHT-SIDED SCIATICA: Primary | ICD-10-CM

## 2025-04-01 DIAGNOSIS — M54.41 CHRONIC RIGHT-SIDED LOW BACK PAIN WITH RIGHT-SIDED SCIATICA: Primary | ICD-10-CM

## 2025-04-01 PROCEDURE — 97110 THERAPEUTIC EXERCISES: CPT | Performed by: PHYSICAL THERAPIST

## 2025-04-01 PROCEDURE — 97112 NEUROMUSCULAR REEDUCATION: CPT | Performed by: PHYSICAL THERAPIST

## 2025-04-01 PROCEDURE — 97010 HOT OR COLD PACKS THERAPY: CPT | Performed by: PHYSICAL THERAPIST

## 2025-04-01 PROCEDURE — 97140 MANUAL THERAPY 1/> REGIONS: CPT | Performed by: PHYSICAL THERAPIST

## 2025-04-01 NOTE — PROGRESS NOTES
Daily Note     Today's date: 2025  Patient name: Curtis Kong  : 1940  MRN: [id number]  Referring provider: Ras España, KATIA  Dx:   Encounter Diagnosis     ICD-10-CM    1. Chronic right-sided low back pain with right-sided sciatica  M54.41     G89.29       2. Difficulty walking  R26.2                      Subjective: patient reports that he is feeling about the same. He hasn't taken any pain medication today. He has been completing home exercises as instructed.      Objective: See treatment diary below    Possible left lateral shift seen with standing and ambulation.    Assessment: Tolerated treatment fair. Patient presented with slightly less pain and irritability compared to evaluation. Began session on recumbent bike to improve LE strength and flexibility. Less tenderness along piriformis seen today, and less pain with neutral gap mobilization. Increased time spent with manual therapy today to try and reduce or centralize symptoms. No significant reduction in pain noted. Patient tolerated neural flossing exercises without pain but required tactile cues for correct technique. Updated home program to include supine exercises today. Patient would benefit from continued skilled PT per plan of care to address impairments and improve function.       Plan: Continue per plan of care.  Monitor posture for lateral shift.     Precautions: Afib. EPOC 25      Manuals 3/27 4/1                        Neutral gap mob gr 1-3 b/l SANDY p! SANDY           Manual traction  Supine hook lying, towel behind calves SANDY           SL p-a mob lumbar spine gr 1-2  SANDY p!           STM sidelying piriformis and paraspinals  SANDY           Neuro Re-Ed                          TA brace             PPT  10x           Sciatic nerve floss supine  10 ea manually assisted           Seated sciatic floss glide  10 ea manually assisted                                     Ther Ex                          Pt edu on pathology, HEP, POC 8'  "4' cane height adjustment, activity modification, pacing           Seated lumbar flex 5x10\"            Seated trunk rotation 5x10\"            Seated trunk ext 5x5\"             Piriformis stretch  nv           SKTC  5x10\" ea           DKTC  3x15\"           Figure 4 stretch  nv           Supine trunk rotation  5x10\"           Ther Activity                                       Gait Training                                       Modalities             MHP 8' sitting, end 6' end                             "

## 2025-04-03 ENCOUNTER — OFFICE VISIT (OUTPATIENT)
Dept: PHYSICAL THERAPY | Facility: CLINIC | Age: 85
End: 2025-04-03
Payer: COMMERCIAL

## 2025-04-03 DIAGNOSIS — G89.29 CHRONIC RIGHT-SIDED LOW BACK PAIN WITH RIGHT-SIDED SCIATICA: Primary | ICD-10-CM

## 2025-04-03 DIAGNOSIS — R26.2 DIFFICULTY WALKING: ICD-10-CM

## 2025-04-03 DIAGNOSIS — M54.41 CHRONIC RIGHT-SIDED LOW BACK PAIN WITH RIGHT-SIDED SCIATICA: Primary | ICD-10-CM

## 2025-04-03 DIAGNOSIS — M54.41 ACUTE RIGHT-SIDED LOW BACK PAIN WITH RIGHT-SIDED SCIATICA: ICD-10-CM

## 2025-04-03 PROCEDURE — 97140 MANUAL THERAPY 1/> REGIONS: CPT

## 2025-04-03 PROCEDURE — 97110 THERAPEUTIC EXERCISES: CPT

## 2025-04-03 NOTE — PROGRESS NOTES
"Daily Note     Today's date: 4/3/2025  Patient name: Curtis Kong  : 1940  MRN: [id number]  Referring provider: Joycelyn Maloney,*  Dx:   Encounter Diagnosis     ICD-10-CM    1. Chronic right-sided low back pain with right-sided sciatica  M54.41     G89.29       2. Difficulty walking  R26.2       3. Acute right-sided low back pain with right-sided sciatica  M54.41                      Subjective:  states that after last visit, he had increased pain in leg the rest of night  He has been compliant with HEP but daughter states he grimmaces as he does the exercises.        Objective: See treatment diary below      Assessment: Pt does not tolerate manuals very well, with soreness during stm and had increased pain with manual traction therefore did not contune with it.  Reviewed HEP in clinic and had in depth discussion with how stretches should feel and that pushing into pain that causes an increase in pain is not the goal of them.  Also that different days different exercises might feel better or worse and to go by how they are affected sxs in leg.  Pt and daughter reported understanding of discussion. . Patient would benefit from continued PT      Plan: Progress treatment as tolerated.       Precautions: Afib. EPOC 25      Manuals 3/27 4/1 4/3                       Neutral gap mob gr 1-3 b/l SANDY p! SANDY DL glide stretch           Manual traction  Supine hook lying, towel behind calves SANDY P!          SL p-a mob lumbar spine gr 1-2  SANDY p!           STM sidelying piriformis and paraspinals  SANDY DL          Neuro Re-Ed                          TA brace             PPT  10x           Sciatic nerve floss supine  10 ea manually assisted           Seated sciatic floss glide  10 ea manually assisted                                     Ther Ex                          Pt edu on pathology, HEP, POC 8' 4' cane height adjustment, activity modification, pacing           Seated lumbar flex 5x10\"          " "  Seated trunk rotation 5x10\"            Seated trunk ext 5x5\"             Piriformis stretch  nv           SKTC  5x10\" ea 10\"x5 es          DKTC  3x15\" 10\"x5          Figure 4 stretch  nv Phase 1  10\"x5          Supine trunk rotation  5x10\" No hold x10          Ther Activity                                       Gait Training                                       Modalities             MHP 8' sitting, end 6' end                               "

## 2025-04-08 ENCOUNTER — OFFICE VISIT (OUTPATIENT)
Dept: PHYSICAL THERAPY | Facility: CLINIC | Age: 85
End: 2025-04-08
Payer: COMMERCIAL

## 2025-04-08 DIAGNOSIS — G89.29 CHRONIC RIGHT-SIDED LOW BACK PAIN WITH RIGHT-SIDED SCIATICA: Primary | ICD-10-CM

## 2025-04-08 DIAGNOSIS — M54.41 ACUTE RIGHT-SIDED LOW BACK PAIN WITH RIGHT-SIDED SCIATICA: ICD-10-CM

## 2025-04-08 DIAGNOSIS — R26.2 DIFFICULTY WALKING: ICD-10-CM

## 2025-04-08 DIAGNOSIS — M54.41 CHRONIC RIGHT-SIDED LOW BACK PAIN WITH RIGHT-SIDED SCIATICA: Primary | ICD-10-CM

## 2025-04-08 PROCEDURE — 97112 NEUROMUSCULAR REEDUCATION: CPT | Performed by: PHYSICAL THERAPIST

## 2025-04-08 PROCEDURE — 97140 MANUAL THERAPY 1/> REGIONS: CPT | Performed by: PHYSICAL THERAPIST

## 2025-04-08 PROCEDURE — 97110 THERAPEUTIC EXERCISES: CPT | Performed by: PHYSICAL THERAPIST

## 2025-04-08 PROCEDURE — 97010 HOT OR COLD PACKS THERAPY: CPT | Performed by: PHYSICAL THERAPIST

## 2025-04-08 NOTE — PROGRESS NOTES
Daily Note     Today's date: 2025  Patient name: Curtis Kong  : 1940  MRN: [id number]  Referring provider: Ras España, KATIA  Dx:   Encounter Diagnosis     ICD-10-CM    1. Chronic right-sided low back pain with right-sided sciatica  M54.41     G89.29       2. Difficulty walking  R26.2       3. Acute right-sided low back pain with right-sided sciatica  M54.41                      Subjective: Patient reports that he is feeling okay today, about 6/10 thigh pain with standing and walking. He and is daughter think his pain is getting a little better. He has been doing supine nerve glides, knee to chest stretch, and supine trunk rotation which he has the most difficulty and pain with for home program.        Objective: See treatment diary below      Assessment: Began session with manual therapy to reduce left lateral shift; performed technique in sitting and standing. Patient had pain during shift correction but no worsening of symptoms following and improvement in posture. Trial of 3 way lumbar flexion over physioball which was tolerated well. Piriformis cross over stretch also tolerated without aggravation of symptoms so progressed home program with this exercises. Patient tolerated soft tissue mobilization to paraspinals in sidelying well, but still unable to tolerate manual traction or get relief from. Patient would benefit from continued skilled PT per plan of care to address impairments and improve function.       Plan: Progress treatment as tolerated.  Reduce irritability as able.     Precautions: Afib. EPOC 25      Manuals 3/27 4/1 4/3 4/8         Left Lateral shift correction    10x sitting, 10x standing         Neutral gap mob gr 1-3 b/l SANDY p! SANDY DL glide stretch           Manual traction  Supine hook lying, towel behind calves SANDY P! Attempted in SL, both proximal and distal         SL p-a mob lumbar spine gr 1-2  SANDY p!           STM sidelying piriformis and paraspinals  SANDY DL SANDY  "sidelying b/l paraspinals only         Neuro Re-Ed                          TA brace             PPT  10x  10x         Sciatic nerve floss supine  10 ea manually assisted  10 R manually assisted         Supine sciatic tensioner    10x3'         Seated sciatic floss glide  10 ea manually assisted           Seated sciatic tensioner    10x neutral spine                      Ther Ex                          Pt edu on pathology, HEP, POC 8' 4' cane height adjustment, activity modification, pacing   3' Hep update         Lumbar flexion over physioball    5x10\"         Seated lumbar flex 5x10\"   5x5\"         Seated trunk rotation 5x10\"            Seated trunk ext 5x5\"    5x5\" p!         Piriformis stretch  nv  3x15\"         SKTC  5x10\" ea 10\"x5 es 3x10\" ea         DKTC  3x15\" 10\"x5 3x10\"          Figure 4 stretch  nv Phase 1  10\"x5 2x15\" p!         Supine trunk rotation  5x10\" No hold x10 5x5\" p!         Ther Activity                                       Gait Training                                       Modalities             MHP 8' sitting, end 6' end  8' end                             "

## 2025-04-10 ENCOUNTER — OFFICE VISIT (OUTPATIENT)
Dept: PHYSICAL THERAPY | Facility: CLINIC | Age: 85
End: 2025-04-10
Payer: COMMERCIAL

## 2025-04-10 ENCOUNTER — TELEPHONE (OUTPATIENT)
Age: 85
End: 2025-04-10

## 2025-04-10 DIAGNOSIS — M54.41 CHRONIC RIGHT-SIDED LOW BACK PAIN WITH RIGHT-SIDED SCIATICA: Primary | ICD-10-CM

## 2025-04-10 DIAGNOSIS — M54.41 ACUTE RIGHT-SIDED LOW BACK PAIN WITH RIGHT-SIDED SCIATICA: ICD-10-CM

## 2025-04-10 DIAGNOSIS — G89.29 CHRONIC RIGHT-SIDED LOW BACK PAIN WITH RIGHT-SIDED SCIATICA: Primary | ICD-10-CM

## 2025-04-10 DIAGNOSIS — R26.2 DIFFICULTY WALKING: ICD-10-CM

## 2025-04-10 PROCEDURE — 97140 MANUAL THERAPY 1/> REGIONS: CPT | Performed by: PHYSICAL THERAPIST

## 2025-04-10 PROCEDURE — 97112 NEUROMUSCULAR REEDUCATION: CPT | Performed by: PHYSICAL THERAPIST

## 2025-04-10 PROCEDURE — 97110 THERAPEUTIC EXERCISES: CPT | Performed by: PHYSICAL THERAPIST

## 2025-04-10 NOTE — PROGRESS NOTES
Daily Note     Today's date: 4/10/2025  Patient name: Curtis Knog  : 1940  MRN: [id number]  Referring provider: Ras España, PT  Dx:   Encounter Diagnosis     ICD-10-CM    1. Chronic right-sided low back pain with right-sided sciatica  M54.41     G89.29       2. Difficulty walking  R26.2       3. Acute right-sided low back pain with right-sided sciatica  M54.41                      Subjective: Patient reports that sometimes it doesn't hurt that bad, and sometimes it still hurts a lot. He didn't feel worse following last session. He used pillow between legs at night as recommended.       Objective: See treatment diary below      Assessment: Patient continues to respond well to soft tissue mobilization to paraspinals. Psoas and hip flexor muscle length was assessed but increased pain when attempting STM or passive stretching. Also trial of R hip mobilization to improve ER and IR mobility; improved ER following but no change in thigh pain. Patient would benefit from continued skilled PT per plan of care to address impairments and improve function.       Plan: Progress treatment as tolerated.  Reduce irritability as able.     Precautions: Afib. EPOC 25      Manuals 3/27 4/1 4/3 4/8 4/10        Left Lateral shift correction    10x sitting, 10x standing 10x sitting, 10x standing        Neutral gap mob gr 1-3 b/l SANDY p! SANDY DL glide stretch           Manual traction  Supine hook lying, towel behind calves SANDY P! Attempted in SL, both proximal and distal         Inferior lateral R hip jt mob gr 1-3     SANDY        SL p-a mob lumbar spine gr 1-2  SANDY p!           STM sidelying piriformis and paraspinals  SANDY DL SANDY sidelying b/l paraspinals only SANDY b/l sidelying, and psoas assessed        Neuro Re-Ed                          TA brace     10x        PPT  10x  10x 5x p!        Sciatic nerve floss supine  10 ea manually assisted  10 R manually assisted         Supine sciatic tensioner    10x3'         Seated  "sciatic floss glide  10 ea manually assisted           Seated sciatic tensioner    10x neutral spine                      Ther Ex                          Pt edu on pathology, HEP, POC 8' 4' cane height adjustment, activity modification, pacing   3' Hep update 3'        Lumbar flexion over physioball    5x10\" 5x10\" 3 way        Seated lumbar flex 5x10\"   5x5\"         Seated trunk rotation 5x10\"            Seated trunk ext 5x5\"    5x5\" p!         Piriformis stretch  nv  3x15\" 3x15\"        SKTC  5x10\" ea 10\"x5 es 3x10\" ea         DKTC  3x15\" 10\"x5 3x10\"  5x10\"        Figure 4 stretch  nv Phase 1  10\"x5 2x15\" p! 3x30\" R5        Supine trunk rotation  5x10\" No hold x10 5x5\" p!         Ther Activity                                       Gait Training                                       Modalities             P 8' sitting, end 6' end  8' end 8' end                            "

## 2025-04-10 NOTE — TELEPHONE ENCOUNTER
Caller: Alka Kong, spouse    Doctor: Dr. Ross    Reason for call: Patient received a letter to reschedule his appointment with Dr Ross in August.  The appointment notes said there were available appointments in [place].  I did not see any.  Please call Alka.    Thank you    Call back#: [phone]

## 2025-04-15 ENCOUNTER — OFFICE VISIT (OUTPATIENT)
Dept: PHYSICAL THERAPY | Facility: CLINIC | Age: 85
End: 2025-04-15
Payer: COMMERCIAL

## 2025-04-15 DIAGNOSIS — M54.41 CHRONIC RIGHT-SIDED LOW BACK PAIN WITH RIGHT-SIDED SCIATICA: Primary | ICD-10-CM

## 2025-04-15 DIAGNOSIS — G89.29 CHRONIC RIGHT-SIDED LOW BACK PAIN WITH RIGHT-SIDED SCIATICA: Primary | ICD-10-CM

## 2025-04-15 DIAGNOSIS — M54.41 ACUTE RIGHT-SIDED LOW BACK PAIN WITH RIGHT-SIDED SCIATICA: ICD-10-CM

## 2025-04-15 DIAGNOSIS — R26.2 DIFFICULTY WALKING: ICD-10-CM

## 2025-04-15 PROCEDURE — 97010 HOT OR COLD PACKS THERAPY: CPT | Performed by: PHYSICAL THERAPIST

## 2025-04-15 PROCEDURE — 97140 MANUAL THERAPY 1/> REGIONS: CPT | Performed by: PHYSICAL THERAPIST

## 2025-04-15 PROCEDURE — 97110 THERAPEUTIC EXERCISES: CPT | Performed by: PHYSICAL THERAPIST

## 2025-04-15 NOTE — PROGRESS NOTES
Daily Note     Today's date: 4/15/2025  Patient name: Curtis Kong  : 1940  MRN: [id number]  Referring provider: Ras España, PT  Dx:   Encounter Diagnosis     ICD-10-CM    1. Chronic right-sided low back pain with right-sided sciatica  M54.41     G89.29       2. Difficulty walking  R26.2       3. Acute right-sided low back pain with right-sided sciatica  M54.41                      Subjective: Patient reports that he didn't feel worse following last session, and may have been a little better that night. However the pain was bad the next day. Compared to 1 month ago, he thinks his symptoms are a little better. His pain ranges from 5-10/10 when not sitting.    Objective: See treatment diary below    Left   negative crossed SLR test     Right   SLR test (inconclusive as no worse with passive leg raise, but increased when lowering)    Assessment: Patient displays less pain and irritability with neural testing today compared to evaluation. Patient continues to have pain that is provoked with most exercises. He tolerates soft tissue mobilization to paraspinals well. Ended session with recumbent bike to improve lower extremity mobility and strength. Patient would benefit from continued skilled PT per plan of care to address impairments and improve function.       Plan: Progress treatment as tolerated.  Reduce irritability as able.     Precautions: Afib. EPOC 25      Manuals 3/27 4/1 4/3 4/8 4/10 4/15       Left Lateral shift correction    10x sitting, 10x standing 10x sitting, 10x standing        Neutral gap mob gr 1-3 b/l SANDY p! SANDY DL glide stretch           Manual traction  Supine hook lying, towel behind calves SANDY P! Attempted in SL, both proximal and distal  SANDY supine       Inferior lateral R hip jt mob gr 1-3     SANDY        SL p-a mob lumbar spine gr 1-2  SANDY p!           STM sidelying piriformis and paraspinals  SANDY DL SANDY sidelying b/l paraspinals only SANDY b/l sidelying, and psoas assessed SANDY b/l  "sidelying STM psoas cleared       Neuro Re-Ed                          TA brace     10x 10x       PPT  10x  10x 5x p!        Sciatic nerve floss supine  10 ea manually assisted  10 R manually assisted         Supine sciatic tensioner    10x3'         Seated sciatic floss glide  10 ea manually assisted           Seated sciatic tensioner    10x neutral spine                      Ther Ex             Recumbent bike for ROM and strength      5' lvl 0       Pt edu on pathology, HEP, POC 8' 4' cane height adjustment, activity modification, pacing   3' Hep update 3' 2'       Lumbar flexion over physioball    5x10\" 5x10\" 3 way 5x10\" 3 way       Seated lumbar flex 5x10\"   5x5\"         Seated trunk rotation 5x10\"            Seated trunk ext 5x5\"    5x5\" p!         Piriformis stretch  nv  3x15\" 3x15\" 3x15\" ea       SKTC  5x10\" ea 10\"x5 es 3x10\" ea         DKTC  3x15\" 10\"x5 3x10\"  5x10\" 5x10\"       Figure 4 stretch  nv Phase 1  10\"x5 2x15\" p! 3x30\" R5 3x15\" ea       Supine trunk rotation  5x10\" No hold x10 5x5\" p!         Ther Activity                                       Gait Training                                       Modalities             MHP 8' sitting, end 6' end  8' end 8' end 8' w/RB and roll out                           "

## 2025-04-17 ENCOUNTER — OFFICE VISIT (OUTPATIENT)
Dept: PHYSICAL THERAPY | Facility: CLINIC | Age: 85
End: 2025-04-17
Payer: COMMERCIAL

## 2025-04-17 DIAGNOSIS — G89.29 CHRONIC RIGHT-SIDED LOW BACK PAIN WITH RIGHT-SIDED SCIATICA: Primary | ICD-10-CM

## 2025-04-17 DIAGNOSIS — R26.2 DIFFICULTY WALKING: ICD-10-CM

## 2025-04-17 DIAGNOSIS — M54.41 ACUTE RIGHT-SIDED LOW BACK PAIN WITH RIGHT-SIDED SCIATICA: ICD-10-CM

## 2025-04-17 DIAGNOSIS — M54.41 CHRONIC RIGHT-SIDED LOW BACK PAIN WITH RIGHT-SIDED SCIATICA: Primary | ICD-10-CM

## 2025-04-17 PROCEDURE — 97140 MANUAL THERAPY 1/> REGIONS: CPT | Performed by: PHYSICAL THERAPIST

## 2025-04-17 PROCEDURE — 97110 THERAPEUTIC EXERCISES: CPT | Performed by: PHYSICAL THERAPIST

## 2025-04-17 PROCEDURE — 97010 HOT OR COLD PACKS THERAPY: CPT | Performed by: PHYSICAL THERAPIST

## 2025-04-17 NOTE — PROGRESS NOTES
Daily Note     Today's date: 2025  Patient name: Curtis Kong  : 1940  MRN: [id number]  Referring provider: Ras España, KATIA  Dx:   Encounter Diagnosis     ICD-10-CM    1. Chronic right-sided low back pain with right-sided sciatica  M54.41     G89.29       2. Difficulty walking  R26.2       3. Acute right-sided low back pain with right-sided sciatica  M54.41                      Subjective: Patient reports that he doesn't feel to bad today, and last night wasn't that bad. He had a hard time sleeping the night before that but doesn't think it was related to PT session. He reports feeling no worse following last PT session.    Objective: See treatment diary below      Assessment: Patient presented with trigger points and soft tissue restrictions along b/l paraspinals and right gluteal muscles. Trigger points on R paraspinals responded well to STM with release following manuals. Patient had less irritability of symptoms as he could tolerate figure 4 stretch b/l with less pain. Patient is making gradual progress with therapy and would benefit from continued skilled PT per plan of care to address impairments and improve function.       Plan: Progress treatment as tolerated.  Reduce irritability as able.     Precautions: Afib. EPOC 25      Manuals 3/27 4/1 4/3 4/8 4/10 4/15 4/17      Left Lateral shift correction    10x sitting, 10x standing 10x sitting, 10x standing  10x sitting      Neutral gap mob gr 1-3 b/l SANDY p! SANDY DL glide stretch           Manual traction  Supine hook lying, towel behind calves SANDY P! Attempted in SL, both proximal and distal  SANDY supine       Inferior lateral R hip jt mob gr 1-3     SANDY        SL p-a mob lumbar spine gr 1-2  SANDY p!           STM sidelying piriformis and paraspinals  SANDY DL SANDY sidelying b/l paraspinals only SANDY b/l sidelying, and psoas assessed SANDY b/l sidelying STM psoas cleared SANDY b/l sidelying STM  and R gluteals      Neuro Re-Ed                          TA  "brace     10x 10x 5x p!      PPT  10x  10x 5x p!        Sciatic nerve floss supine  10 ea manually assisted  10 R manually assisted         Supine sciatic tensioner    10x3'         Seated sciatic floss glide  10 ea manually assisted           Seated sciatic tensioner    10x neutral spine   15x ea                   Ther Ex             Recumbent bike for ROM and strength      5' lvl 0 5' lvl 1      Pt edu on pathology, HEP, POC 8' 4' cane height adjustment, activity modification, pacing   3' Hep update 3' 2'       Lumbar flexion over physioball    5x10\" 5x10\" 3 way 5x10\" 3 way 5x10\" 3 way      Seated lumbar flex 5x10\"   5x5\"         Seated trunk rotation 5x10\"            Seated trunk ext 5x5\"    5x5\" p!   5x5\"      [email or web address]       20 ea      Piriformis stretch  nv  3x15\" 3x15\" 3x15\" ea 3x15\" ea      SKTC  5x10\" ea 10\"x5 es 3x10\" ea         DKTC  3x15\" 10\"x5 3x10\"  5x10\" 5x10\" 5x10\"      Figure 4 stretch  nv Phase 1  10\"x5 2x15\" p! 3x30\" R5 3x15\" ea 3x15\" ea      Supine trunk rotation  5x10\" No hold x10 5x5\" p!         Ther Activity                                       Gait Training                                       Modalities             MHP 8' sitting, end 6' end  8' end 8' end 8' w/RB and roll out 8' w/RB and roll out                          "

## 2025-04-21 ENCOUNTER — OFFICE VISIT (OUTPATIENT)
Dept: FAMILY MEDICINE CLINIC | Facility: CLINIC | Age: 85
End: 2025-04-21
Payer: COMMERCIAL

## 2025-04-21 VITALS
HEART RATE: 65 BPM | SYSTOLIC BLOOD PRESSURE: 122 MMHG | WEIGHT: 152 LBS | OXYGEN SATURATION: 97 % | DIASTOLIC BLOOD PRESSURE: 66 MMHG | BODY MASS INDEX: 21.81 KG/M2

## 2025-04-21 DIAGNOSIS — R97.20 ELEVATED PROSTATE SPECIFIC ANTIGEN (PSA): ICD-10-CM

## 2025-04-21 DIAGNOSIS — E78.2 HYPERLIPEMIA, MIXED: ICD-10-CM

## 2025-04-21 DIAGNOSIS — M54.16 LUMBAR RADICULOPATHY: Primary | ICD-10-CM

## 2025-04-21 PROCEDURE — 99214 OFFICE O/P EST MOD 30 MIN: CPT

## 2025-04-21 NOTE — PROGRESS NOTES
"Name: Curtis Kong      : 1940      MRN: [id number]  Encounter Provider: AMBREEN Olivarez  Encounter Date: 2025   Encounter department: [facility]  :  Assessment & Plan  Lumbar radiculopathy  This is a new problem. The current episode started more than 1 month ago. The problem occurs constantly. The problem has been waxing and waning but improving since onset. The pain is present in the lumbar spine. The quality of the pain is described as shooting (sharp). The pain radiates to the right posterior thigh and right posterior knee. The symptoms are aggravated by position (walking). Associated symptoms include leg pain and tingling (right buttock and hip). Pertinent negatives include no abdominal pain, bladder incontinence, bowel incontinence, chest pain, dysuria, fever, headaches, numbness, paresis, paresthesias, pelvic pain, perianal numbness, weakness or weight loss. Patient reports not relief with rest, heat, ice, acetaminophen, biofreeze, salonpas, prednisone. Insurance would not cover lidoderm patches. Cannot take ibuprofen due to eliquis. Gabapentin at night \"works sometimes but it helps me sleep so that is good.\" Patient reports slow improvement with PT. Declines pain mgmt.     3/3/25 lumbar XR--  Lumbar XR showed--     FINDINGS:  No acute fracture. Intact pedicles.  L2 slight spondylolisthesis (disc slip forward). Anatomic alignment otherwise. Lumbar lordosis straightening (curve in lower spine is straightening).  Posterior facets mild degenerative sclerosis (hardening/straightening). L4-L5, L5-S1 marked disc space narrowing.  Unremarkable soft tissues.     IMPRESSION:  Lumbar lordosis straightening.  Degenerative changes.  No acute osseous abnormality.         Hyperlipemia, mixed  Component      Latest Ref Rng 3/26/2025   Cholesterol      100 - 199 mg/dL 221 (H)    Triglycerides      0 - 149 mg/dL 106    HDL      >39 mg/dL 66    VLDL Cholesterol " Troy      5 - 40 mg/dL 19    LDL Calculated      0 - 99 mg/dL 136 (H)    LDL/HDL RATIO      0.0 - 3.6 ratio 2.1       Decline statin therapy at this time.        Elevated prostate specific antigen (PSA)  Component      Latest Ref Rng 3/26/2025   PSA      0.0 - 4.0 ng/mL 16.9 (H)    REFLEX CRITERIA Comment       Follows with [facility].               History of Present Illness   See assessment/plan.         Review of Systems   Constitutional:  Negative for activity change, fatigue and fever.   HENT:  Negative for congestion, ear pain, rhinorrhea and sore throat.    Eyes:  Negative for pain.   Respiratory:  Negative for cough, shortness of breath and wheezing.    Cardiovascular:  Negative for chest pain and leg swelling.   Gastrointestinal:  Negative for abdominal pain, diarrhea, nausea and vomiting.   Musculoskeletal:  Positive for back pain. Negative for arthralgias and myalgias.   Skin:  Negative for rash.   Neurological:  Negative for dizziness, weakness and numbness.   All other systems reviewed and are negative.      Objective   /66 (BP Location: Left arm, Patient Position: Sitting, Cuff Size: Standard)   Pulse 65   Wt 68.9 kg (152 lb)   SpO2 97%   BMI 21.81 kg/m²      Physical Exam  Vitals and nursing note reviewed.   Constitutional:       General: He is not in acute distress.     Appearance: Normal appearance. He is well-developed. He is not ill-appearing.   HENT:      Head: Normocephalic and atraumatic.      Right Ear: External ear normal.      Left Ear: External ear normal.   Cardiovascular:      Rate and Rhythm: Normal rate and regular rhythm.      Heart sounds: Normal heart sounds. No murmur heard.  Pulmonary:      Effort: Pulmonary effort is normal. No respiratory distress.      Breath sounds: Normal breath sounds. No wheezing.   Abdominal:      General: Bowel sounds are normal. There is no distension.      Palpations: Abdomen is soft.      Tenderness: There is no abdominal tenderness.    Musculoskeletal:      Cervical back: Neck supple.      Lumbar back: Spasms present. No tenderness. Positive right straight leg raise test.   Skin:     General: Skin is warm and dry.      Capillary Refill: Capillary refill takes less than 2 seconds.   Neurological:      Mental Status: He is alert and oriented to person, place, and time. Mental status is at baseline.   Psychiatric:         Mood and Affect: Mood normal.         Behavior: Behavior normal.       Administrative Statements   I have spent a total time of 30 minutes in caring for this patient on the day of the visit/encounter including Diagnostic results, Risks and benefits of tx options, Instructions for management, and Importance of tx compliance.

## 2025-04-21 NOTE — ASSESSMENT & PLAN NOTE
Component      Latest Ref Rng 3/26/2025   Cholesterol      100 - 199 mg/dL 221 (H)    Triglycerides      0 - 149 mg/dL 106    HDL      >39 mg/dL 66    VLDL Cholesterol Troy      5 - 40 mg/dL 19    LDL Calculated      0 - 99 mg/dL 136 (H)    LDL/HDL RATIO      0.0 - 3.6 ratio 2.1       Decline statin therapy at this time.

## 2025-04-21 NOTE — ASSESSMENT & PLAN NOTE
Component      Latest Ref Rng 3/26/2025   PSA      0.0 - 4.0 ng/mL 16.9 (H)    REFLEX CRITERIA Comment       Follows with [facility].

## 2025-04-21 NOTE — ASSESSMENT & PLAN NOTE
"This is a new problem. The current episode started more than 1 month ago. The problem occurs constantly. The problem has been waxing and waning but improving since onset. The pain is present in the lumbar spine. The quality of the pain is described as shooting (sharp). The pain radiates to the right posterior thigh and right posterior knee. The symptoms are aggravated by position (walking). Associated symptoms include leg pain and tingling (right buttock and hip). Pertinent negatives include no abdominal pain, bladder incontinence, bowel incontinence, chest pain, dysuria, fever, headaches, numbness, paresis, paresthesias, pelvic pain, perianal numbness, weakness or weight loss. Patient reports not relief with rest, heat, ice, acetaminophen, biofreeze, salonpas, prednisone. Insurance would not cover lidoderm patches. Cannot take ibuprofen due to eliquis. Gabapentin at night \"works sometimes but it helps me sleep so that is good.\" Patient reports slow improvement with PT. Declines pain mgmt.     3/3/25 lumbar XR--  Lumbar XR showed--     FINDINGS:  No acute fracture. Intact pedicles.  L2 slight spondylolisthesis (disc slip forward). Anatomic alignment otherwise. Lumbar lordosis straightening (curve in lower spine is straightening).  Posterior facets mild degenerative sclerosis (hardening/straightening). L4-L5, L5-S1 marked disc space narrowing.  Unremarkable soft tissues.     IMPRESSION:  Lumbar lordosis straightening.  Degenerative changes.  No acute osseous abnormality.         "

## 2025-04-22 ENCOUNTER — OFFICE VISIT (OUTPATIENT)
Dept: PHYSICAL THERAPY | Facility: CLINIC | Age: 85
End: 2025-04-22
Payer: COMMERCIAL

## 2025-04-22 DIAGNOSIS — G89.29 CHRONIC RIGHT-SIDED LOW BACK PAIN WITH RIGHT-SIDED SCIATICA: Primary | ICD-10-CM

## 2025-04-22 DIAGNOSIS — M54.41 CHRONIC RIGHT-SIDED LOW BACK PAIN WITH RIGHT-SIDED SCIATICA: Primary | ICD-10-CM

## 2025-04-22 DIAGNOSIS — R26.2 DIFFICULTY WALKING: ICD-10-CM

## 2025-04-22 DIAGNOSIS — M54.41 ACUTE RIGHT-SIDED LOW BACK PAIN WITH RIGHT-SIDED SCIATICA: ICD-10-CM

## 2025-04-22 PROCEDURE — 97140 MANUAL THERAPY 1/> REGIONS: CPT | Performed by: PHYSICAL THERAPIST

## 2025-04-22 PROCEDURE — 97110 THERAPEUTIC EXERCISES: CPT | Performed by: PHYSICAL THERAPIST

## 2025-04-22 PROCEDURE — 97010 HOT OR COLD PACKS THERAPY: CPT | Performed by: PHYSICAL THERAPIST

## 2025-04-22 NOTE — PROGRESS NOTES
Daily Note     Today's date: 2025  Patient name: Curtis Kong  : 1940  MRN: [id number]  Referring provider: Ras España PT  Dx:   Encounter Diagnosis     ICD-10-CM    1. Chronic right-sided low back pain with right-sided sciatica  M54.41     G89.29       2. Difficulty walking  R26.2       3. Acute right-sided low back pain with right-sided sciatica  M54.41                      Subjective: Patient reports that his pain is only about a 4/10 today, which is improvement. It hasn't been as bad at night, and he is able to straighten is leg with less difficulty and pain. He had follow up with PCP. He was offered again referral to pain management but he declined because he is feeling improvement with PT and he isn't interested in spinal injections.      Objective: See treatment diary below      Assessment: Patient was able to tolerate hip ROM exercises b/l with less pain, and able to active flex and extend hip in supine with very little difficulty. Patient was also able to resume supine trunk rotation and tolerate sidelying positions with much less back/leg pain. This all indicates improvement in pain and irritability of symptoms. Patient is making gradual progress with therapy and would benefit from continued skilled PT per plan of care to address impairments and improve function.       Plan: Progress treatment as tolerated.  Reduce irritability as able.     Precautions: Afib. EPOC 25      Manuals 3/27 4/1 4/3 4/8 4/10 4/15 4/17 4/22     Left Lateral shift correction    10x sitting, 10x standing 10x sitting, 10x standing  10x sitting 10x sitting     Neutral gap mob gr 1-3 b/l SANDY p! SANDY DL glide stretch           Manual traction  Supine hook lying, towel behind calves SANDY P! Attempted in SL, both proximal and distal  SANDY supine       Inferior lateral R hip jt mob gr 1-3     SANDY        SL p-a mob lumbar spine gr 1-2  SANDY p!           STM sidelying piriformis and paraspinals  SANDY DL SANDY sidelying b/l  "paraspinals only SANDY b/l sidelying, and psoas assessed SANDY b/l sidelying STM psoas cleared SANDY b/l sidelying STM  and R gluteals SANDY b/l sidelying STM  and L gluteals     Neuro Re-Ed                          TA brace     10x 10x 5x p!      PPT  10x  10x 5x p!        Sciatic nerve floss supine  10 ea manually assisted  10 R manually assisted         Supine sciatic tensioner    10x3'         Seated sciatic floss glide  10 ea manually assisted           Seated sciatic tensioner    10x neutral spine   15x ea 15x                  Ther Ex             Recumbent bike for ROM and strength      5' lvl 0 5' lvl 1 5' lvl 1     Pt edu on pathology, HEP, POC 8' 4' cane height adjustment, activity modification, pacing   3' Hep update 3' 2'       Lumbar flexion over physioball    5x10\" 5x10\" 3 way 5x10\" 3 way 5x10\" 3 way 5x10\" 3 way     Seated lumbar flex 5x10\"   5x5\"         Seated trunk rotation 5x10\"            Seated trunk ext 5x5\"    5x5\" p!   5x5\"      clamshells       20 ea 20 ea     Piriformis stretch  nv  3x15\" 3x15\" 3x15\" ea 3x15\" ea 3x15\" ea     SKTC  5x10\" ea 10\"x5 es 3x10\" ea         DKTC  3x15\" 10\"x5 3x10\"  5x10\" 5x10\" 5x10\" 5x10\"     Figure 4 stretch  nv Phase 1  10\"x5 2x15\" p! 3x30\" R5 3x15\" ea 3x15\" ea 3x15\" ea     Supine trunk rotation  5x10\" No hold x10 5x5\" p!    5x10\" ea     Ther Activity                                       Gait Training                                       Modalities             MHP 8' sitting, end 6' end  8' end 8' end 8' w/RB and roll out 8' w/RB and roll out 8' w/RB and roll out and bike                         "

## 2025-04-24 ENCOUNTER — OFFICE VISIT (OUTPATIENT)
Dept: PHYSICAL THERAPY | Facility: CLINIC | Age: 85
End: 2025-04-24
Payer: COMMERCIAL

## 2025-04-24 DIAGNOSIS — M54.41 ACUTE RIGHT-SIDED LOW BACK PAIN WITH RIGHT-SIDED SCIATICA: ICD-10-CM

## 2025-04-24 DIAGNOSIS — R26.2 DIFFICULTY WALKING: ICD-10-CM

## 2025-04-24 DIAGNOSIS — M54.41 CHRONIC RIGHT-SIDED LOW BACK PAIN WITH RIGHT-SIDED SCIATICA: Primary | ICD-10-CM

## 2025-04-24 DIAGNOSIS — G89.29 CHRONIC RIGHT-SIDED LOW BACK PAIN WITH RIGHT-SIDED SCIATICA: Primary | ICD-10-CM

## 2025-04-24 PROCEDURE — 97110 THERAPEUTIC EXERCISES: CPT | Performed by: PHYSICAL THERAPIST

## 2025-04-24 PROCEDURE — 97140 MANUAL THERAPY 1/> REGIONS: CPT | Performed by: PHYSICAL THERAPIST

## 2025-04-24 NOTE — PROGRESS NOTES
Daily Note     Today's date: 2025  Patient name: Curtis Kong  : 1940  MRN: [id number]  Referring provider: Ras España, PT  Dx:   Encounter Diagnosis     ICD-10-CM    1. Chronic right-sided low back pain with right-sided sciatica  M54.41     G89.29       2. Difficulty walking  R26.2       3. Acute right-sided low back pain with right-sided sciatica  M54.41                      Subjective: Patient reports that he felt good following last session and was able to sleep in his bed for the first time in awhile. He felt pretty good yesterday too and slept in his bed again last night. He woke up being stiff and sore today, but still rates pain only at 4/10.      Objective: See treatment diary below      Assessment: Patient displayed improved spinal posture in standing with less apparent lateral shift. He was also able to tolerate neutral gap mobilization on R side and unilateral traction without provocation of symptoms, with goal of decompression and further reduction in radicular pain. Patient is making gradual progress with therapy and would benefit from continued skilled PT per plan of care to address impairments and improve function.       Plan: Progress treatment as tolerated.  Reduce irritability as able. Progress Report next visit.     Precautions: Afib. EPOC 25      Manuals 3/27 4/1 4/3 4/8 4/10 4/15 4/17 4/22 4/24 NE   Left Lateral shift correction    10x sitting, 10x standing 10x sitting, 10x standing  10x sitting 10x sitting     Neutral gap mob gr 1-3 b/l SANDY p! SANDY DL glide stretch       SANDY R side    Manual traction  Supine hook lying, towel behind calves SANDY P! Attempted in SL, both proximal and distal  SANDY supine   SANDY UL R side    Inferior lateral R hip jt mob gr 1-3     SANDY        SL p-a mob lumbar spine gr 1-2  SANDY p!           STM sidelying piriformis and paraspinals  SANDY DL SANDY sidelying b/l paraspinals only SANDY b/l sidelying, and psoas assessed SANDY b/l sidelying STM psoas cleared SANDY b/l  "sidelying STM  and R gluteals SANDY b/l sidelying STM  and L gluteals SANDY b/l sidelying STM  and L gluteals    Neuro Re-Ed                          TA brace     10x 10x 5x p!      PPT  10x  10x 5x p!        Sciatic nerve floss supine  10 ea manually assisted  10 R manually assisted         Supine sciatic tensioner    10x3'         Seated sciatic floss glide  10 ea manually assisted           Seated sciatic tensioner    10x neutral spine   15x ea 15x 15 ea                 Ther Ex             Recumbent bike for ROM and strength      5' lvl 0 5' lvl 1 5' lvl 1 5' lvl 1    Pt edu on pathology, HEP, POC 8' 4' cane height adjustment, activity modification, pacing   3' Hep update 3' 2'       Lumbar flexion over physioball    5x10\" 5x10\" 3 way 5x10\" 3 way 5x10\" 3 way 5x10\" 3 way 5x10\" 3 way                 Seated trunk rotation 5x10\"            Seated trunk ext 5x5\"    5x5\" p!   5x5\"  10x2\"    clamshells       20 ea 20 ea 20 ea    Piriformis stretch  nv  3x15\" 3x15\" 3x15\" ea 3x15\" ea 3x15\" ea     SKTC  5x10\" ea 10\"x5 es 3x10\" ea         DKTC  3x15\" 10\"x5 3x10\"  5x10\" 5x10\" 5x10\" 5x10\" 5x10\"    Figure 4 stretch  nv Phase 1  10\"x5 2x15\" p! 3x30\" R5 3x15\" ea 3x15\" ea 3x15\" ea 3x15\"    Supine trunk rotation  5x10\" No hold x10 5x5\" p!    5x10\" ea 5x10\" ea    Ther Activity                                       Gait Training                                       Modalities             MHP 8' sitting, end 6' end  8' end 8' end 8' w/RB and roll out 8' w/RB and roll out 8' w/RB and roll out and bike 8' w/RB and roll out and bike                        "

## 2025-04-29 ENCOUNTER — OFFICE VISIT (OUTPATIENT)
Dept: PHYSICAL THERAPY | Facility: CLINIC | Age: 85
End: 2025-04-29
Payer: COMMERCIAL

## 2025-04-29 DIAGNOSIS — M54.41 CHRONIC RIGHT-SIDED LOW BACK PAIN WITH RIGHT-SIDED SCIATICA: Primary | ICD-10-CM

## 2025-04-29 DIAGNOSIS — G89.29 CHRONIC RIGHT-SIDED LOW BACK PAIN WITH RIGHT-SIDED SCIATICA: Primary | ICD-10-CM

## 2025-04-29 DIAGNOSIS — R26.2 DIFFICULTY WALKING: ICD-10-CM

## 2025-04-29 DIAGNOSIS — M54.41 ACUTE RIGHT-SIDED LOW BACK PAIN WITH RIGHT-SIDED SCIATICA: ICD-10-CM

## 2025-04-29 PROCEDURE — 97110 THERAPEUTIC EXERCISES: CPT | Performed by: PHYSICAL THERAPIST

## 2025-04-29 PROCEDURE — 97140 MANUAL THERAPY 1/> REGIONS: CPT | Performed by: PHYSICAL THERAPIST

## 2025-04-29 NOTE — PROGRESS NOTES
"Progress Report     Today's date: 2025  Patient name: Curtis Kong  : 1940  MRN: [id number]  Referring provider: Ras España PT  Dx:   Encounter Diagnosis     ICD-10-CM    1. Chronic right-sided low back pain with right-sided sciatica  M54.41     G89.29       2. Difficulty walking  R26.2       3. Acute right-sided low back pain with right-sided sciatica  M54.41                      Subjective: Patient reports that he had a good weekend with pain at 2/10 at some times. He reports pain can increase to 5/10 with certain movements, \"it comes and goes.\" He has been able to sleep in his bed the past 6 nights, which is a big improvement. He can descend steps reciprocally, but will do 1 at a time going up. He hasn't been getting pins/needles recently or R calf pain, but does report some left calf pain recently. He has less difficulty getting into the shower and bed. He no longer has to sit to shave in the morning. His biggest complaint is pain when first getting up.       Objective: See treatment diary below    Lumbar AROM  Flexion 90%  Extension 50%  R side bending 75%  L side bending 75%  R rotation 100%  L rotation 100%    Slum test: negative b/l    SLR test: positive b/l    Assessment: Patient has been treated for 10 sessions since evaluation 1 month ago. Treatment has consisted of manual therapy to improve ROM and joint mobility, therapeutic exercises and activities to improve core strength and flexibility, neuromuscular reeducation to improve postural stabilization and neural tension, and patient education on home program. Objectively, patient demonstrates significant improvement in lumbar extension and rotation mobility, and less overall irritability of symptoms with tolerance of PT exercises. Functionally, patient notes less pain intensity and less difficulty stair climbing, getting in/out of bed, in/out of shower, and being able to sleep in bed now. Patient is responding well to treatment so " "far and would benefit from continued skilled PT per plan of care to address impairments and improve function.         Plan: Progress treatment as tolerated.  Reduce irritability as able.      Precautions: Afib. EPOC 5/23/25      Manuals 3/27 4/1 4/3 4/8 4/10 4/15 4/17 4/22 4/24 4/29   Left Lateral shift correction    10x sitting, 10x standing 10x sitting, 10x standing  10x sitting 10x sitting     Neutral gap mob gr 1-3 b/l SANDY p! SANDY DL glide stretch       SANDY R side    Manual traction  Supine hook lying, towel behind calves SANDY P! Attempted in SL, both proximal and distal  SANDY supine   SANDY UL R side    Inferior lateral R hip jt mob gr 1-3     SANDY        SL p-a mob lumbar spine gr 1-2  SANDY p!        SANDY   STM sidelying piriformis and paraspinals  SANDY DL SANDY sidelying b/l paraspinals only SANDY b/l sidelying, and psoas assessed SANDY b/l sidelying STM psoas cleared SANDY b/l sidelying STM  and R gluteals SANDY b/l sidelying STM  and L gluteals SANDY b/l sidelying STM  and L gluteals SANDY b/l sidelying STM  and L gluteals   Neuro Re-Ed                          TA brace     10x 10x 5x p!      PPT  10x  10x 5x p!        Sciatic nerve floss supine  10 ea manually assisted  10 R manually assisted         Supine sciatic tensioner    10x3'      nv   Seated sciatic floss glide  10 ea manually assisted           Seated sciatic tensioner    10x neutral spine   15x ea 15x 15 ea 15 ea                Ther Ex             Recumbent bike for ROM and strength      5' lvl 0 5' lvl 1 5' lvl 1 5' lvl 1 5' lvl 1   Pt edu on pathology, HEP, POC 8' 4' cane height adjustment, activity modification, pacing   3' Hep update 3' 2'    Progress Report assessments   Lumbar flexion over physioball    5x10\" 5x10\" 3 way 5x10\" 3 way 5x10\" 3 way 5x10\" 3 way 5x10\" 3 way 5x10\" 3 way   Standing 3 way hip strengthening          10 ea                Seated trunk ext 5x5\"    5x5\" p!   5x5\"  10x2\" Standing 10x   clamshells       20 ea 20 ea 20 ea nv   Piriformis stretch  nv  3x15\" 3x15\" " "3x15\" ea 3x15\" ea 3x15\" ea     SKTC  5x10\" ea 10\"x5 es 3x10\" ea         DKTC  3x15\" 10\"x5 3x10\"  5x10\" 5x10\" 5x10\" 5x10\" 5x10\" 5x10\"   Figure 4 stretch  nv Phase 1  10\"x5 2x15\" p! 3x30\" R5 3x15\" ea 3x15\" ea 3x15\" ea 3x15\"    Supine trunk rotation  5x10\" No hold x10 5x5\" p!    5x10\" ea 5x10\" ea 5x10\" ea   Ther Activity                                       Gait Training                                       Modalities             MHP 8' sitting, end 6' end  8' end 8' end 8' w/RB and roll out 8' w/RB and roll out 8' w/RB and roll out and bike 8' w/RB and roll out and bike 8' end                       "

## 2025-05-01 ENCOUNTER — OFFICE VISIT (OUTPATIENT)
Dept: PHYSICAL THERAPY | Facility: CLINIC | Age: 85
End: 2025-05-01
Attending: PHYSICAL THERAPIST
Payer: COMMERCIAL

## 2025-05-01 DIAGNOSIS — M54.41 ACUTE RIGHT-SIDED LOW BACK PAIN WITH RIGHT-SIDED SCIATICA: ICD-10-CM

## 2025-05-01 DIAGNOSIS — M54.41 CHRONIC RIGHT-SIDED LOW BACK PAIN WITH RIGHT-SIDED SCIATICA: Primary | ICD-10-CM

## 2025-05-01 DIAGNOSIS — G89.29 CHRONIC RIGHT-SIDED LOW BACK PAIN WITH RIGHT-SIDED SCIATICA: Primary | ICD-10-CM

## 2025-05-01 DIAGNOSIS — R26.2 DIFFICULTY WALKING: ICD-10-CM

## 2025-05-01 PROCEDURE — 97140 MANUAL THERAPY 1/> REGIONS: CPT | Performed by: PHYSICAL THERAPIST

## 2025-05-01 PROCEDURE — 97110 THERAPEUTIC EXERCISES: CPT | Performed by: PHYSICAL THERAPIST

## 2025-05-01 NOTE — PROGRESS NOTES
"Daily Note     Today's date: 2025  Patient name: Curtis Kong  : 1940  MRN: [id number]  Referring provider: Ras España, PT  Dx:   Encounter Diagnosis     ICD-10-CM    1. Chronic right-sided low back pain with right-sided sciatica  M54.41     G89.29       2. Difficulty walking  R26.2       3. Acute right-sided low back pain with right-sided sciatica  M54.41                      Subjective: Wil explains that his exercises continue to treat him well      Objective: See treatment diary below      Assessment: Tolerated treatment well. Patient demonstrated fatigue post treatment and exhibited good technique with therapeutic exercises. Wil responded well to all manuals and mobilizations, relief noted subjectively post session. Corrections to form made with supine sciatic nerve tensioners.      Plan: Continue per plan of care.      Precautions: Afib. EPOC 25      Manuals    Left Lateral shift correction             Neutral gap mob gr 1-3 b/l RN        SANDY R side    Manual traction         SANDY UL R side    Inferior lateral R hip jt mob gr 1-3             SL p-a mob lumbar spine gr 1-2 RN         SANDY   STM sidelying piriformis and paraspinals RN  sidelying STM and L gluteals        SANDY b/l sidelying STM  and L gluteals SANDY b/l sidelying STM  and L gluteals   Neuro Re-Ed                          TA brace             PPT             Sciatic nerve floss supine             Supine sciatic tensioner 10x5''         nv   Seated sciatic floss glide             Seated sciatic tensioner X15 ea        15 ea 15 ea                Ther Ex             Recumbent bike for ROM and strength 5' lvl 1        5' lvl 1 5' lvl 1   Pt edu on pathology, HEP, POC          Progress Report assessments   Lumbar flexion over physioball 5x10'' 3 way        5x10\" 3 way 5x10\" 3 way   Standing 3 way hip strengthening X10 ea         10 ea                Seated trunk ext 10x standing        10x2\" Standing 10x " "  clamshells 2x10 ea        20 ea nv   Piriformis stretch             SKTC             DKTC 5x10''        5x10\" 5x10\"   Figure 4 stretch 3x15''        3x15\"    Supine trunk rotation 5x10'' ea        5x10\" ea 5x10\" ea   Ther Activity                                       Gait Training                                       Modalities             MHP 8' sitting, end        8' w/RB and roll out and bike 8' end                         "

## 2025-05-06 ENCOUNTER — OFFICE VISIT (OUTPATIENT)
Dept: PHYSICAL THERAPY | Facility: CLINIC | Age: 85
End: 2025-05-06
Attending: PHYSICAL THERAPIST
Payer: COMMERCIAL

## 2025-05-06 DIAGNOSIS — G89.29 CHRONIC RIGHT-SIDED LOW BACK PAIN WITH RIGHT-SIDED SCIATICA: Primary | ICD-10-CM

## 2025-05-06 DIAGNOSIS — M54.41 ACUTE RIGHT-SIDED LOW BACK PAIN WITH RIGHT-SIDED SCIATICA: ICD-10-CM

## 2025-05-06 DIAGNOSIS — R26.2 DIFFICULTY WALKING: ICD-10-CM

## 2025-05-06 DIAGNOSIS — M54.41 CHRONIC RIGHT-SIDED LOW BACK PAIN WITH RIGHT-SIDED SCIATICA: Primary | ICD-10-CM

## 2025-05-06 PROCEDURE — 97112 NEUROMUSCULAR REEDUCATION: CPT | Performed by: PHYSICAL THERAPIST

## 2025-05-06 PROCEDURE — 97140 MANUAL THERAPY 1/> REGIONS: CPT | Performed by: PHYSICAL THERAPIST

## 2025-05-06 PROCEDURE — 97110 THERAPEUTIC EXERCISES: CPT | Performed by: PHYSICAL THERAPIST

## 2025-05-06 PROCEDURE — 97010 HOT OR COLD PACKS THERAPY: CPT | Performed by: PHYSICAL THERAPIST

## 2025-05-06 NOTE — PROGRESS NOTES
"Daily Note     Today's date: 2025  Patient name: Curtis Kong  : 1940  MRN: [id number]  Referring provider: Ras España PT  Dx:   Encounter Diagnosis     ICD-10-CM    1. Chronic right-sided low back pain with right-sided sciatica  M54.41     G89.29       2. Difficulty walking  R26.2       3. Acute right-sided low back pain with right-sided sciatica  M54.41                      Subjective: Patient reports that he is \"doing great.\" Patient hasn't used the cane in 4 days and was able to walk a little outside. He reports that pain still comes but not all the time. He reports low level pain with ambulation today. He no longer worries about his leg giving out on him.      Objective: See treatment diary below      Assessment: Tolerated treatment well. Patient tolerated joint mobilizations to lumbar spine with greater grade without pain. He was also able to perform lumbar extension in standing without thigh pain and core strengthening progression with bridges. Patient did trip over foot when ambulating toward end of session but independently regained his balance. Recommended patient resume use of cane if he feels that is catching his foot when ambulated. He verbalized understanding. Patient presents with significant reduction in irritability of symptoms and is making good progress. Patient requires continued skilled PT per plan of care to address remaining impairments and improve function in home and community.       Plan: Continue per plan of care.  Progress as tolerated.     Precautions: Afib. EPOC 25      Manuals    Left Lateral shift correction             Neutral gap mob gr 1-3 b/l RN SANDY       SANDY R side    Manual traction         SANDY UL R side    Inferior lateral R hip jt mob gr 1-3             SL p-a mob lumbar spine gr 1-2 RN SANDY        SANDY   STM sidelying piriformis and paraspinals RN  sidelying STM and L gluteals SANDY DELGADO b/l sidelying STM  and L gluteals SANDY b/l " "sidelying STM  and L gluteals   Neuro Re-Ed                          TA brace             Glut set w/bridge  10x           Sciatic nerve floss supine             Supine sciatic tensioner 10x5'' 15x        nv   Seated sciatic floss glide             Seated sciatic tensioner X15 ea        15 ea 15 ea                Ther Ex             Recumbent bike for ROM and strength 5' lvl 1 5' lvl 2       5' lvl 1 5' lvl 1   Pt edu on pathology, HEP, POC          Progress Report assessments   Lumbar flexion over physioball 5x10'' 3 way 5x10'' 3 way       5x10\" 3 way 5x10\" 3 way   Standing 3 way hip strengthening X10 ea nv        10 ea                Seated trunk ext 10x standing 10x standing       10x2\" Standing 10x   clamshells 2x10 ea 3x10       20 ea nv   Piriformis stretch             SKTC             DKTC 5x10'' 5x10\"       5x10\" 5x10\"   Figure 4 stretch 3x15'' 4x15\"       3x15\"    Supine trunk rotation 5x10'' ea 5x10\"       5x10\" ea 5x10\" ea   Ther Activity                                       Gait Training                                       Modalities             MHP 8' sitting, end 8' sitting end       8' w/RB and roll out and bike 8' end                         "

## 2025-05-08 ENCOUNTER — OFFICE VISIT (OUTPATIENT)
Dept: PHYSICAL THERAPY | Facility: CLINIC | Age: 85
End: 2025-05-08
Attending: PHYSICAL THERAPIST
Payer: COMMERCIAL

## 2025-05-08 DIAGNOSIS — R26.2 DIFFICULTY WALKING: ICD-10-CM

## 2025-05-08 DIAGNOSIS — M54.41 CHRONIC RIGHT-SIDED LOW BACK PAIN WITH RIGHT-SIDED SCIATICA: Primary | ICD-10-CM

## 2025-05-08 DIAGNOSIS — M54.41 ACUTE RIGHT-SIDED LOW BACK PAIN WITH RIGHT-SIDED SCIATICA: ICD-10-CM

## 2025-05-08 DIAGNOSIS — G89.29 CHRONIC RIGHT-SIDED LOW BACK PAIN WITH RIGHT-SIDED SCIATICA: Primary | ICD-10-CM

## 2025-05-08 PROCEDURE — 97110 THERAPEUTIC EXERCISES: CPT | Performed by: PHYSICAL THERAPIST

## 2025-05-08 PROCEDURE — 97010 HOT OR COLD PACKS THERAPY: CPT | Performed by: PHYSICAL THERAPIST

## 2025-05-08 PROCEDURE — 97140 MANUAL THERAPY 1/> REGIONS: CPT | Performed by: PHYSICAL THERAPIST

## 2025-05-08 NOTE — PROGRESS NOTES
Daily Note     Today's date: 2025  Patient name: Curtis Kong  : 1940  MRN: [id number]  Referring provider: Ras España, PT  Dx:   Encounter Diagnosis     ICD-10-CM    1. Chronic right-sided low back pain with right-sided sciatica  M54.41     G89.29       2. Difficulty walking  R26.2       3. Acute right-sided low back pain with right-sided sciatica  M54.41                      Subjective: Patient reports a little soreness following last session but not bad.      Objective: See treatment diary below      Assessment: Tolerated treatment well. Held lumbar roll joint mobilization due to increased soreness following previous session. Patient was able to perform lumbar extension with minimal to no pain. Standing hip extension and flexion were held due to pain after standing abduction at railing. Patient requires continued skilled PT per plan of care to address remaining impairments and improve function in home and community.       Plan: Continue per plan of care.  Progress as tolerated.     Precautions: Afib. EPOC 25      Manuals    Left Lateral shift correction             Neutral gap mob gr 1-3 b/l RN SANDY       SANDY R side    Manual traction         SANDY UL R side    Inferior lateral R hip jt mob gr 1-3             SL p-a mob lumbar spine gr 1-2 RN SANDY SANDY       SANDY   STM sidelying piriformis and paraspinals RN  sidelying STM and L gluteals SANDY SANDY      SANDY b/l sidelying STM  and L gluteals SANDY b/l sidelying STM  and L gluteals   Neuro Re-Ed                          TA brace             Glut set w/bridge  10x 10x          Sciatic nerve floss supine             Supine sciatic tensioner 10x5'' 15x        nv   Seated sciatic floss glide             Seated sciatic tensioner X15 ea        15 ea 15 ea                Ther Ex             Recumbent bike for ROM and strength 5' lvl 1 5' lvl 2 5' lvl 2      5' lvl 1 5' lvl 1   Pt edu on pathology, HEP, POC          Progress Report  "assessments   Lumbar flexion over physioball 5x10'' 3 way 5x10'' 3 way 5x10'' 3 way      5x10\" 3 way 5x10\" 3 way   Standing 3 way hip strengthening X10 ea nv 20x abd       10 ea                Seated trunk ext 10x standing 10x standing 10x standing      10x2\" Standing 10x   clamshells 2x10 ea 3x10 3x10      20 ea nv   Piriformis stretch             SKTC             DKTC 5x10'' 5x10\" 5x10\"      5x10\" 5x10\"   Figure 4 stretch 3x15'' 4x15\" 5x10\"      3x15\"    Supine trunk rotation 5x10'' ea 5x10\" 5x10\"      5x10\" ea 5x10\" ea   Ther Activity                                       Gait Training                                       Modalities             MHP 8' sitting, end 8' sitting end 5' sitting end      8' w/RB and roll out and bike 8' end                         "

## 2025-05-13 ENCOUNTER — OFFICE VISIT (OUTPATIENT)
Dept: PHYSICAL THERAPY | Facility: CLINIC | Age: 85
End: 2025-05-13
Attending: PHYSICAL THERAPIST
Payer: COMMERCIAL

## 2025-05-13 DIAGNOSIS — G89.29 CHRONIC RIGHT-SIDED LOW BACK PAIN WITH RIGHT-SIDED SCIATICA: Primary | ICD-10-CM

## 2025-05-13 DIAGNOSIS — R26.2 DIFFICULTY WALKING: ICD-10-CM

## 2025-05-13 DIAGNOSIS — M54.41 ACUTE RIGHT-SIDED LOW BACK PAIN WITH RIGHT-SIDED SCIATICA: ICD-10-CM

## 2025-05-13 DIAGNOSIS — M54.41 CHRONIC RIGHT-SIDED LOW BACK PAIN WITH RIGHT-SIDED SCIATICA: Primary | ICD-10-CM

## 2025-05-13 PROCEDURE — 97010 HOT OR COLD PACKS THERAPY: CPT | Performed by: PHYSICAL THERAPIST

## 2025-05-13 PROCEDURE — 97140 MANUAL THERAPY 1/> REGIONS: CPT | Performed by: PHYSICAL THERAPIST

## 2025-05-13 PROCEDURE — 97110 THERAPEUTIC EXERCISES: CPT | Performed by: PHYSICAL THERAPIST

## 2025-05-13 NOTE — PROGRESS NOTES
Daily Note     Today's date: 2025  Patient name: Curtis Kong  : 1940  MRN: [id number]  Referring provider: Ras España, KATIA  Dx:   Encounter Diagnosis     ICD-10-CM    1. Chronic right-sided low back pain with right-sided sciatica  M54.41     G89.29       2. Difficulty walking  R26.2       3. Acute right-sided low back pain with right-sided sciatica  M54.41                      Subjective: Patient reports that he hasn't had more pain than 3/10 over the past week and sometimes when walking, no pain at all. He walked more this weekend and had some pain following, but still not bad.      Objective: See treatment diary below      Assessment: Tolerated treatment well. Patient had no provocation of symptoms with any exercises today. He continues to have tenderness and soft tissue restrictions along b/l gluteals, so progressed home program with addition of piriformis stretch b/l. Patient requires continued skilled PT per plan of care to address remaining impairments and improve function in home and community.       Plan: Continue per plan of care.  Progress as tolerated.     Precautions: Afib. EPOC 25      Manuals  5 5   Left Lateral shift correction             Neutral gap mob gr 1-3 b/l RN SANDY       SANDY R side    Manual traction         SANDY UL R side    Inferior lateral R hip jt mob gr 1-3             SL p-a mob lumbar spine gr 1-2 RN SANDY SANDY SANDY      SANDY   STM sidelying piriformis and paraspinals RN  sidelying STM and L gluteals SANDY SANDY SANDY     SANDY b/l sidelying STM  and L gluteals SANDY b/l sidelying STM  and L gluteals   Neuro Re-Ed                          TA brace             Glut set w/bridge  10x 10x 10x         Sciatic nerve floss supine             Supine sciatic tensioner 10x5'' 15x        nv   Seated sciatic floss glide             Seated sciatic tensioner X15 ea        15 ea 15 ea                Ther Ex             Recumbent bike for ROM and strength 5' lvl 1 5' lvl 2 5'  "lvl 2 5' lvl 2     5' lvl 1 5' lvl 1   Pt edu on pathology, HEP, POC    2'      Progress Report assessments   Lumbar flexion over physioball 5x10'' 3 way 5x10'' 3 way 5x10'' 3 way 5x10'' 3 way     5x10\" 3 way 5x10\" 3 way   Standing 3 way hip strengthening X10 ea nv 20x abd 10x abd      10 ea   HR/TR    20x ea         Seated trunk ext 10x standing 10x standing 10x standing 10x standing     10x2\" Standing 10x   clamshells 2x10 ea 3x10 3x10 3x12     20 ea nv   Piriformis stretch    3x30\" ea         SKTC             DKTC 5x10'' 5x10\" 5x10\"      5x10\" 5x10\"   Figure 4 stretch 3x15'' 4x15\" 5x10\" 5x10\"     3x15\"    Supine trunk rotation 5x10'' ea 5x10\" 5x10\" 5x10\"     5x10\" ea 5x10\" ea   Ther Activity                                       Gait Training                                       Modalities             MHP 8' sitting, end 8' sitting end 5' sitting end 8' end     8' w/RB and roll out and bike 8' end                         "

## 2025-05-15 ENCOUNTER — OFFICE VISIT (OUTPATIENT)
Dept: PHYSICAL THERAPY | Facility: CLINIC | Age: 85
End: 2025-05-15
Attending: PHYSICAL THERAPIST
Payer: COMMERCIAL

## 2025-05-15 DIAGNOSIS — M54.41 ACUTE RIGHT-SIDED LOW BACK PAIN WITH RIGHT-SIDED SCIATICA: ICD-10-CM

## 2025-05-15 DIAGNOSIS — G89.29 CHRONIC RIGHT-SIDED LOW BACK PAIN WITH RIGHT-SIDED SCIATICA: Primary | ICD-10-CM

## 2025-05-15 DIAGNOSIS — M54.41 CHRONIC RIGHT-SIDED LOW BACK PAIN WITH RIGHT-SIDED SCIATICA: Primary | ICD-10-CM

## 2025-05-15 DIAGNOSIS — R26.2 DIFFICULTY WALKING: ICD-10-CM

## 2025-05-15 PROCEDURE — 97010 HOT OR COLD PACKS THERAPY: CPT | Performed by: PHYSICAL THERAPIST

## 2025-05-15 PROCEDURE — 97140 MANUAL THERAPY 1/> REGIONS: CPT | Performed by: PHYSICAL THERAPIST

## 2025-05-15 PROCEDURE — 97112 NEUROMUSCULAR REEDUCATION: CPT | Performed by: PHYSICAL THERAPIST

## 2025-05-15 PROCEDURE — 97110 THERAPEUTIC EXERCISES: CPT | Performed by: PHYSICAL THERAPIST

## 2025-05-15 NOTE — PROGRESS NOTES
"Daily Note     Today's date: 5/15/2025  Patient name: Curtis Kong  : 1940  MRN: [id number]  Referring provider: Ras España, PT  Dx:   Encounter Diagnosis     ICD-10-CM    1. Chronic right-sided low back pain with right-sided sciatica  M54.41     G89.29       2. Difficulty walking  R26.2       3. Acute right-sided low back pain with right-sided sciatica  M54.41                      Subjective: Patient reports \"it's 100% better.\" No pain currently and only mild pain yesterday.       Objective: See treatment diary below      Assessment: Tolerated treatment well. Patient still has tenderness along b/l piriformis but tolerates manual therapy well, and displays improved joint mobility with sidelying p-a mobilizations. Good tolerance to core and hip strengthening exercises today. Patient requires continued skilled PT per plan of care to address remaining impairments and improve function in home and community.       Plan: Continue per plan of care.  Progress as tolerated.     Precautions: Afib. EPOC 25      Manuals 5/1 5/6 5/8 5/13 5/15    4/24 4/29   Left Lateral shift correction             Neutral gap mob gr 1-3 b/l RN SANDY       SANDY R side    Manual traction         SANDY UL R side    Inferior lateral R hip jt mob gr 1-3             SL p-a mob lumbar spine gr 1-2 RN SANDY SANDY SANDY SANDY     SANDY   STM sidelying piriformis and paraspinals RN  sidelying STM and L gluteals SANDY SANDY SANDY SANDY    SANDY b/l sidelying STM  and L gluteals SANDY b/l sidelying STM  and L gluteals   Neuro Re-Ed                          TA brace     15x PPT        Glut set w/bridge  10x 10x 10x 10x        Sciatic nerve floss supine             Supine sciatic tensioner 10x5'' 15x        nv   Seated sciatic floss glide             Seated sciatic tensioner X15 ea        15 ea 15 ea                Ther Ex             Recumbent bike for ROM and strength 5' lvl 1 5' lvl 2 5' lvl 2 5' lvl 2 5' lvl 2    5' lvl 1 5' lvl 1   Pt edu on pathology, HEP, POC    2'     " " Progress Report assessments   Lumbar flexion over physioball 5x10'' 3 way 5x10'' 3 way 5x10'' 3 way 5x10'' 3 way 5x10'' 3 way    5x10\" 3 way 5x10\" 3 way   Standing 3 way hip strengthening X10 ea nv 20x abd 10x abd 15x abd     10 ea   HR/TR    20x ea 20 ea        Seated trunk ext 10x standing 10x standing 10x standing 10x standing 10x standing    10x2\" Standing 10x   clamshells 2x10 ea 3x10 3x10 3x12 3x12    20 ea nv   Piriformis stretch    3x30\" ea 3x30\"        SKTC             DKTC 5x10'' 5x10\" 5x10\"      5x10\" 5x10\"   Figure 4 stretch 3x15'' 4x15\" 5x10\" 5x10\" 3x20\"    3x15\"    Supine trunk rotation 5x10'' ea 5x10\" 5x10\" 5x10\" 5x10\"    5x10\" ea 5x10\" ea   Ther Activity                                       Gait Training                                       Modalities             MHP 8' sitting, end 8' sitting end 5' sitting end 8' end 8' end    8' w/RB and roll out and bike 8' end                         "

## 2025-05-20 ENCOUNTER — OFFICE VISIT (OUTPATIENT)
Dept: PHYSICAL THERAPY | Facility: CLINIC | Age: 85
End: 2025-05-20
Attending: PHYSICAL THERAPIST
Payer: COMMERCIAL

## 2025-05-20 DIAGNOSIS — M54.41 ACUTE RIGHT-SIDED LOW BACK PAIN WITH RIGHT-SIDED SCIATICA: ICD-10-CM

## 2025-05-20 DIAGNOSIS — G89.29 CHRONIC RIGHT-SIDED LOW BACK PAIN WITH RIGHT-SIDED SCIATICA: Primary | ICD-10-CM

## 2025-05-20 DIAGNOSIS — M54.41 CHRONIC RIGHT-SIDED LOW BACK PAIN WITH RIGHT-SIDED SCIATICA: Primary | ICD-10-CM

## 2025-05-20 DIAGNOSIS — R26.2 DIFFICULTY WALKING: ICD-10-CM

## 2025-05-20 PROCEDURE — 97112 NEUROMUSCULAR REEDUCATION: CPT | Performed by: PHYSICAL THERAPIST

## 2025-05-20 PROCEDURE — 97010 HOT OR COLD PACKS THERAPY: CPT | Performed by: PHYSICAL THERAPIST

## 2025-05-20 PROCEDURE — 97110 THERAPEUTIC EXERCISES: CPT | Performed by: PHYSICAL THERAPIST

## 2025-05-20 PROCEDURE — 97140 MANUAL THERAPY 1/> REGIONS: CPT | Performed by: PHYSICAL THERAPIST

## 2025-05-20 NOTE — PROGRESS NOTES
Daily Note     Today's date: 2025  Patient name: Curtis Kong  : 1940  MRN: [id number]  Referring provider: Ras España, KATIA  Dx:   Encounter Diagnosis     ICD-10-CM    1. Chronic right-sided low back pain with right-sided sciatica  M54.41     G89.29       2. Difficulty walking  R26.2       3. Acute right-sided low back pain with right-sided sciatica  M54.41                      Subjective: Patient reports near full function. He thinks he will be ready for discharge after this week. He has not gone on a longer walk yet, but this is not because of his back.       Objective: See treatment diary below      Assessment: Tolerated treatment well. Further progression of core strengthening exercises with paloff press. Patient had less tenderness and soft tissue restrictions along piriformis muscles today. Patient requires continued skilled PT per plan of care to address remaining impairments and improve function in home and community.       Plan: Continue per plan of care.  Progress as tolerated. D/C to home program nv pending no issues     Precautions: Afib. EPOC 25      Manuals  5 5/8 5/13 5/15 5/20   4/24 4/29   Left Lateral shift correction             Neutral gap mob gr 1-3 b/l RN SANDY       SANDY R side    Manual traction         SANDY UL R side    Inferior lateral R hip jt mob gr 1-3             SL p-a mob lumbar spine gr 1-2 RN SANDY SANDY SANDY SANDY SANDY    SANDY   STM sidelying piriformis and paraspinals RN  sidelying STM and L gluteals SANDY SANDY SANDY SANDY SANDY   SANDY b/l sidelying STM  and L gluteals SANDY b/l sidelying STM  and L gluteals   Neuro Re-Ed             Paloff press      10 ea gtb       TA brace     15x PPT        Glut set w/bridge  10x 10x 10x 10x 10x       Sciatic nerve floss supine             Supine sciatic tensioner 10x5'' 15x        nv   Seated sciatic floss glide             Seated sciatic tensioner X15 ea        15 ea 15 ea                Ther Ex             Recumbent bike for ROM and strength 5' lvl  "1 5' lvl 2 5' lvl 2 5' lvl 2 5' lvl 2 5' lvl 2   5' lvl 1 5' lvl 1   Pt edu on pathology, HEP, POC    2'      Progress Report assessments   Lumbar flexion over physioball 5x10'' 3 way 5x10'' 3 way 5x10'' 3 way 5x10'' 3 way 5x10'' 3 way 5x10'' 3 way   5x10\" 3 way 5x10\" 3 way   Standing 3 way hip strengthening X10 ea nv 20x abd 10x abd 15x abd 20 ea abd w/core brace    10 ea   HR/TR    20x ea 20 ea 20 ea       Seated trunk ext 10x standing 10x standing 10x standing 10x standing 10x standing 10x standing   10x2\" Standing 10x   clamshells 2x10 ea 3x10 3x10 3x12 3x12 30x   20 ea nv   Piriformis stretch    3x30\" ea 3x30\" 3x30\"       SKTC             DKTC 5x10'' 5x10\" 5x10\"      5x10\" 5x10\"   Figure 4 stretch 3x15'' 4x15\" 5x10\" 5x10\" 3x20\"    3x15\"    minisquats      10 x       Supine trunk rotation 5x10'' ea 5x10\" 5x10\" 5x10\" 5x10\" 5x10\"   5x10\" ea 5x10\" ea   Ther Activity                                       Gait Training                                       Modalities             MHP 8' sitting, end 8' sitting end 5' sitting end 8' end 8' end 6' end   8' w/RB and roll out and bike 8' end                         "

## 2025-05-22 ENCOUNTER — OFFICE VISIT (OUTPATIENT)
Dept: PHYSICAL THERAPY | Facility: CLINIC | Age: 85
End: 2025-05-22
Attending: PHYSICAL THERAPIST
Payer: COMMERCIAL

## 2025-05-22 DIAGNOSIS — R26.2 DIFFICULTY WALKING: ICD-10-CM

## 2025-05-22 DIAGNOSIS — M54.41 CHRONIC RIGHT-SIDED LOW BACK PAIN WITH RIGHT-SIDED SCIATICA: Primary | ICD-10-CM

## 2025-05-22 DIAGNOSIS — M54.41 ACUTE RIGHT-SIDED LOW BACK PAIN WITH RIGHT-SIDED SCIATICA: ICD-10-CM

## 2025-05-22 DIAGNOSIS — G89.29 CHRONIC RIGHT-SIDED LOW BACK PAIN WITH RIGHT-SIDED SCIATICA: Primary | ICD-10-CM

## 2025-05-22 PROCEDURE — 97010 HOT OR COLD PACKS THERAPY: CPT | Performed by: PHYSICAL THERAPIST

## 2025-05-22 PROCEDURE — 97140 MANUAL THERAPY 1/> REGIONS: CPT | Performed by: PHYSICAL THERAPIST

## 2025-05-22 PROCEDURE — 97110 THERAPEUTIC EXERCISES: CPT | Performed by: PHYSICAL THERAPIST

## 2025-05-22 NOTE — PROGRESS NOTES
"Discharge Summary    Today's date: 2025  Patient name: Curtis Kong  : 1940  MRN: [id number]  Referring provider: Ras España PT  Dx:   Encounter Diagnosis     ICD-10-CM    1. Chronic right-sided low back pain with right-sided sciatica  M54.41     G89.29       2. Difficulty walking  R26.2       3. Acute right-sided low back pain with right-sided sciatica  M54.41                      Subjective: Patient reports that he feels \"about normal\" and almost at 100%. He reports no leg pain this week, and only mild tightness in his back at one point but \"not bad.\" He feels ready for discharge today.      Objective: See treatment diary below    Mild tenderness along ischial tuberosity b/l (distal gluteal musculature)    Lumbar AROM  Flexion 75%  Extension 50%  R side bending 75%  L side bending 50%  R rotation 100%  L rotation 100%    SLR : negative for pain    Assessment: Tolerated treatment well. Patient demonstrates overall improvement in lumbar mobility and especially pain/function. At this time, patient is appropriate for discharge to home program to maintain improvements and further progress upon impairments. Educated patient on updated home program and discharge recommendations; he verbalized understanding to all education.      Plan: Discharge to home program.       Precautions: Afib. EPOC 25      Manuals 5/1 5/6 5/8 5/13 5/15 5/20 5/22   4/29   Left Lateral shift correction             Neutral gap mob gr 1-3 b/l RN SANDY           Manual traction             Inferior lateral R hip jt mob gr 1-3             SL p-a mob lumbar spine gr 1-2 RN SANDY SANDY SANDY SANDY SANDY SANDY   SANDY   STM sidelying piriformis and paraspinals RN  sidelying STM and L gluteals SANDY SANDY SANDY SANDY SANDY SANDY   SANDY b/l sidelying STM  and L gluteals   Neuro Re-Ed             Paloff press      10 ea gtb 15 ea gtb      TA brace     15x PPT        Glut set w/bridge  10x 10x 10x 10x 10x 10x      Sciatic nerve floss supine             Supine sciatic " "tensioner 10x5'' 15x        nv   Seated sciatic floss glide             Seated sciatic tensioner X15 ea         15 ea                Ther Ex             Recumbent bike for ROM and strength 5' lvl 1 5' lvl 2 5' lvl 2 5' lvl 2 5' lvl 2 5' lvl 2    5' lvl 1   Pt edu on pathology, HEP, POC    2'   5' hep/dc   Progress Report assessments   Lumbar flexion over physioball 5x10'' 3 way 5x10'' 3 way 5x10'' 3 way 5x10'' 3 way 5x10'' 3 way 5x10'' 3 way 5x10\" FWD   5x10\" 3 way   Standing 3 way hip strengthening X10 ea nv 20x abd 10x abd 15x abd 20 ea abd w/core brace 20 ea abd w/core brace   10 ea   HR/TR    20x ea 20 ea 20 ea       Seated trunk ext 10x standing 10x standing 10x standing 10x standing 10x standing 10x standing 10x standing   Standing 10x   clamshells 2x10 ea 3x10 3x10 3x12 3x12 30x 30x   nv   Piriformis stretch    3x30\" ea 3x30\" 3x30\" 3x30\"      SKTC             DKTC 5x10'' 5x10\" 5x10\"       5x10\"   Figure 4 stretch 3x15'' 4x15\" 5x10\" 5x10\" 3x20\"  3x30\"      minisquats      10 x 10x      Supine trunk rotation 5x10'' ea 5x10\" 5x10\" 5x10\" 5x10\" 5x10\" 5x10\"   5x10\" ea   Ther Activity                                       Gait Training                                       Modalities             [facility] 8' sitting, end 8' sitting end 5' sitting end 8' end 8' end 6' end 8' end w/EDU   8' end                         "

## 2025-07-05 DIAGNOSIS — I48.91 ATRIAL FIBRILLATION, UNSPECIFIED TYPE (HCC): ICD-10-CM

## 2025-07-05 DIAGNOSIS — I48.19 PERSISTENT ATRIAL FIBRILLATION (HCC): ICD-10-CM

## 2025-07-08 RX ORDER — DILTIAZEM HYDROCHLORIDE 240 MG/1
240 CAPSULE, COATED, EXTENDED RELEASE ORAL DAILY
Qty: 90 CAPSULE | Refills: 1 | Status: SHIPPED | OUTPATIENT
Start: 2025-07-08 | End: 2025-07-15 | Stop reason: SDUPTHER

## 2025-07-09 RX ORDER — FLECAINIDE ACETATE 100 MG/1
100 TABLET ORAL 2 TIMES DAILY
Qty: 180 TABLET | Refills: 0 | Status: SHIPPED | OUTPATIENT
Start: 2025-07-09 | End: 2025-07-15 | Stop reason: SDUPTHER

## 2025-07-15 ENCOUNTER — OFFICE VISIT (OUTPATIENT)
Dept: CARDIOLOGY CLINIC | Facility: CLINIC | Age: 85
End: 2025-07-15
Payer: COMMERCIAL

## 2025-07-15 VITALS
HEIGHT: 70 IN | WEIGHT: 152 LBS | DIASTOLIC BLOOD PRESSURE: 70 MMHG | BODY MASS INDEX: 21.76 KG/M2 | SYSTOLIC BLOOD PRESSURE: 138 MMHG | HEART RATE: 62 BPM

## 2025-07-15 DIAGNOSIS — I48.91 ATRIAL FIBRILLATION, UNSPECIFIED TYPE (HCC): ICD-10-CM

## 2025-07-15 DIAGNOSIS — I48.19 PERSISTENT ATRIAL FIBRILLATION (HCC): ICD-10-CM

## 2025-07-15 DIAGNOSIS — I48.0 PAROXYSMAL ATRIAL FIBRILLATION (HCC): Primary | ICD-10-CM

## 2025-07-15 PROCEDURE — 99213 OFFICE O/P EST LOW 20 MIN: CPT

## 2025-07-15 RX ORDER — FLECAINIDE ACETATE 100 MG/1
100 TABLET ORAL 2 TIMES DAILY
Qty: 180 TABLET | Refills: 3 | Status: SHIPPED | OUTPATIENT
Start: 2025-07-15

## 2025-07-15 RX ORDER — DILTIAZEM HYDROCHLORIDE 240 MG/1
240 CAPSULE, COATED, EXTENDED RELEASE ORAL DAILY
Qty: 90 CAPSULE | Refills: 3 | Status: SHIPPED | OUTPATIENT
Start: 2025-07-15

## 2025-07-15 NOTE — PROGRESS NOTES
Cardiology Follow Up    Curtis Kong  1940  [id number]  [facility]  [place]  [facility] [id number] [phone]    1. Paroxysmal atrial fibrillation (HCC)  POCT ECG      2. Persistent atrial fibrillation (HCC)  apixaban (Eliquis) 5 mg    diltiazem (CARDIZEM CD) 240 mg 24 hr capsule      3. Atrial fibrillation, unspecified type (HCC)  flecainide (TAMBOCOR) 100 mg tablet      Discussion/Summary:  1.  Paroxysmal atrial fibrillation  Cardioversion in 2016 -maintained sinus rhythm since  Last echocardiogram in 2021: EF 60%, normal wall motion normal diastolic function, RV normal size and normal function, mild biatrial dilatation  Patient maintained on flecainide 100 mg twice daily, Eliquis 5 mg twice daily, diltiazem 240 mg daily.  Patient reports occasional hematuria, denies any other problems with bleeding.  Reports that he will stop taking Eliquis for a couple of days surrounding hematuria or significant scrapes or cuts and then resume.  Last problem with blood in the urine several months ago.  Discussed consideration of discontinuing Eliquis in the setting of patient has been sinus rhythm for several years.  Patient and wife opt to continue at this time and will contact our office if he has further problems with bleeding.  Continue with above medications    Patient should follow-up in 6 months or sooner if necessary    Interval History:   Curtis Kong is a 85 y.o. male past medical history of paroxysmal atrial fibrillation, DJD, depression here for routine follow-up.  He is a patient of Dr. Diaz and was last seen in the office on January 15, 2025.    Since his last visit  is overall been doing well, he denies any palpitations.  EKG in the office today demonstrates sinus rhythm with a first-degree AV block and frequent PACs.  He does endorse some occasional intermittent hematuria has not had  this in the last few months however when he does have that he discontinues his Eliquis for a few days and then resumes once it has resolves.    Mr. Kong denies any chest pain, chest pressure, lightheadedness or dizziness or any shortness of breath with exercise.  He has had problems with his sciatic in the recent past and completed physical therapy but continues to do exercises without any anginal symptoms.    Medical Problems       Problem List       Paroxysmal atrial fibrillation (HCC)    Basal cell carcinoma of skin    Benign neoplasm of large intestine    Recurrent major depressive disorder, in partial remission (HCC)    Degenerative joint disease    Prostate hyperplasia, benign localized, without urinary obstruction    Hyperlipemia, mixed    Melanoma in situ of nose (HCC)    Overview Signed 12/2/2021 10:44 AM by Elder Majano MD   Remote history of melanoma.  Had excision.  Sees dermatologist yearly for surveillance.  Patient believes it was taken off 5 years ago.         Stage 3a chronic kidney disease (HCC)    Lab Results   Component Value Date    EGFR 61 03/26/2025    EGFR 59 07/27/2024    EGFR 64 01/29/2024    CREATININE 1.17 03/26/2025    CREATININE 1.12 07/27/2024    CREATININE 1.12 01/29/2024         Lumbar radiculopathy    Elevated prostate specific antigen (PSA)    Overview Signed 4/21/2025  4:32 PM by AMBREEN Olivarez   Numerous negative biopsies with the last 1 in February of 2011. PSA at that time was 12.2.             Past Medical History[1]  Social History     Socioeconomic History    Marital status: /Civil Union     Spouse name: Alka    Number of children: 1    Years of education: Not on file    Highest education level: Not on file   Occupational History    Occupation: Retired   Tobacco Use    Smoking status: Never    Smokeless tobacco: Never   Vaping Use    Vaping status: Never Used   Substance and Sexual Activity    Alcohol use: No    Drug use: No    Sexual activity: Not  on file   Other Topics Concern    Not on file   Social History Narrative    Not on file     Social Drivers of Health     Financial Resource Strain: Low Risk  (2/7/2024)    Overall Financial Resource Strain (CARDIA)     Difficulty of Paying Living Expenses: Not hard at all   Food Insecurity: No Food Insecurity (2/10/2025)    Nursing - Inadequate Food Risk Classification     Worried About Running Out of Food in the Last Year: Never true     Ran Out of Food in the Last Year: Never true     Ran Out of Food in the Last Year: Not on file   Transportation Needs: No Transportation Needs (2/10/2025)    PRAPARE - Transportation     Lack of Transportation (Medical): No     Lack of Transportation (Non-Medical): No   Physical Activity: Not on file   Stress: Not on file   Social Connections: Not on file   Intimate Partner Violence: Not on file   Housing Stability: Low Risk  (2/10/2025)    Housing Stability Vital Sign     Unable to Pay for Housing in the Last Year: No     Number of Times Moved in the Last Year: 0     Homeless in the Last Year: No      Family History[2]  Past Surgical History[3]  Current Medications[4]  No Known Allergies    Labs:     Chemistry        Component Value Date/Time     12/08/2017 0953    K 4.4 03/26/2025 1031     03/26/2025 1031    CO2 21 03/26/2025 1031    BUN 16 03/26/2025 1031    CREATININE 1.17 03/26/2025 1031    CREATININE 1.12 07/27/2024 1601    CREATININE 1.19 12/08/2017 0953        Component Value Date/Time    CALCIUM 10.1 07/27/2024 1601    CALCIUM 9.7 12/08/2017 0953    ALKPHOS 110 12/08/2017 0953    AST 20 03/26/2025 1031    ALT 16 03/26/2025 1031    BILITOT 0.6 12/08/2017 0953            Lab Results   Component Value Date    CHOL 216 (H) 12/08/2017    CHOL 230 (H) 11/14/2016    CHOL 208 (H) 09/03/2015     Lab Results   Component Value Date    HDL 66 03/26/2025    HDL 58 01/29/2024    HDL 65 01/10/2023     Lab Results   Component Value Date    LDLCALC 136 (H) 03/26/2025    LDLCALC  "118 (H) 01/29/2024    LDLCALC 144 (H) 01/10/2023     Lab Results   Component Value Date    TRIG 106 03/26/2025    TRIG 149 01/29/2024    TRIG 132 01/10/2023     Lab Results   Component Value Date    CHOLHDL 3.6 01/10/2023    CHOLHDL 3.3 05/26/2021    CHOLHDL 3.4 06/18/2019       Imaging: No results found.    ECG: Sinus rhythm with a first-degree AV block, PACs      Review of Systems   Constitutional: Negative for malaise/fatigue, weight gain and weight loss.   Cardiovascular:  Negative for chest pain, dyspnea on exertion, irregular heartbeat, leg swelling, near-syncope, orthopnea, palpitations, paroxysmal nocturnal dyspnea and syncope.   Respiratory:  Negative for cough and shortness of breath.    Hematologic/Lymphatic: Positive for bleeding problem. Bruises/bleeds easily.   Musculoskeletal:  Positive for back pain.   Neurological:  Negative for dizziness, light-headedness, loss of balance, numbness, paresthesias, seizures, sensory change, tremors, vertigo and weakness.       Vitals:    07/15/25 0951   BP: 138/70   Pulse: 62     Vitals:    07/15/25 0951   Weight: 68.9 kg (152 lb)     Height: 5' 10\" (177.8 cm)   Body mass index is 21.81 kg/m².      Physical Exam  Constitutional:       Appearance: Normal appearance.   HENT:      Head: Normocephalic and atraumatic.      Nose: Nose normal.      Mouth/Throat:      Mouth: Mucous membranes are moist.     Cardiovascular:      Rate and Rhythm: Normal rate and regular rhythm.      Pulses: Normal pulses.      Heart sounds: Normal heart sounds.   Pulmonary:      Effort: Pulmonary effort is normal.      Breath sounds: Normal breath sounds.   Abdominal:      Palpations: Abdomen is soft.     Musculoskeletal:      Cervical back: Normal range of motion.      Right lower leg: No edema.      Left lower leg: No edema.     Skin:     General: Skin is warm.      Capillary Refill: Capillary refill takes less than 2 seconds.     Neurological:      General: No focal deficit present.      " Mental Status: He is alert and oriented to person, place, and time. Mental status is at baseline.     Psychiatric:         Mood and Affect: Mood normal.         Behavior: Behavior normal.         Thought Content: Thought content normal.              [1]   Past Medical History:  Diagnosis Date    A-fib (HCC)     Atrial fibrillation (HCC)     Basal cell carcinoma     last assessed 10/1/15, resolved 12/15/17    Benign neoplasm of large intestine     last assessed 11/22/16, resolved 12/15/17     Closed displaced fracture of shaft of right clavicle     last assessed 10/20/16, resolved 12/15/17     Diverticulitis of colon     last assessed 3/7/14, resolved 12/15/17     Dysphagia     last assessed 1/20/14, resolved 11/25/15    Hyperlipidemia     Melanoma in situ (HCC)     last assessed 2/15/16, resolved 12/15/17    [2]   Family History  Problem Relation Name Age of Onset    No Known Problems Mother      Substance Abuse Neg Hx      Mental illness Neg Hx     [3]   Past Surgical History:  Procedure Laterality Date    ABSCESS DRAINAGE      Retroperitoneal abscess    ANASTOMOSIS  12/11/2013    Sigmoid colon    COLONOSCOPY      COLOSTOMY  12/11/2013    Temporary    ILEOSTOMY REVISION  02/11/2014    reversal    VASECTOMY     [4]   Current Outpatient Medications:     apixaban (Eliquis) 5 mg, Take 1 tablet (5 mg total) by mouth 2 (two) times a day, Disp: 60 tablet, Rfl: 11    diltiazem (CARDIZEM CD) 240 mg 24 hr capsule, Take 1 capsule (240 mg total) by mouth daily, Disp: 90 capsule, Rfl: 3    famotidine (PEPCID) 20 mg tablet, Take 1 tablet (20 mg total) by mouth daily at bedtime, Disp: 90 tablet, Rfl: 3    flecainide (TAMBOCOR) 100 mg tablet, Take 1 tablet (100 mg total) by mouth 2 (two) times a day, Disp: 180 tablet, Rfl: 3    FLUoxetine (PROzac) 10 mg capsule, TAKE 1 CAPSULE BY MOUTH EVERY DAY, Disp: 90 capsule, Rfl: 3    gabapentin (NEURONTIN) 100 mg capsule, Take 1 capsule (100 mg total) by mouth 2 (two) times a day, Disp: 60  capsule, Rfl: 5    glucosamine-chondroitin 500-400 MG tablet, Take 1 tablet by mouth in the morning and 1 tablet in the evening., Disp: , Rfl:     multivitamin-iron-minerals-folic acid (CENTRUM) chewable tablet, Chew 1 tablet in the morning., Disp: , Rfl:     oxybutynin (DITROPAN-XL) 10 MG 24 hr tablet, Take 10 mg by mouth in the morning., Disp: , Rfl:     Probiotic Product (PROBIOTIC-10 PO), Take 10 mg by mouth in the morning., Disp: , Rfl:     psyllium (METAMUCIL) 58.6 % powder, Take 1 packet by mouth in the morning and 1 packet in the evening and 1 packet before bedtime., Disp: , Rfl:     Saw Palmetto 450 MG CAPS, Take 450 mg by mouth in the morning., Disp: , Rfl: